# Patient Record
Sex: MALE | Race: WHITE | NOT HISPANIC OR LATINO | Employment: OTHER | URBAN - METROPOLITAN AREA
[De-identification: names, ages, dates, MRNs, and addresses within clinical notes are randomized per-mention and may not be internally consistent; named-entity substitution may affect disease eponyms.]

---

## 2023-07-18 ENCOUNTER — OFFICE VISIT (OUTPATIENT)
Dept: PRIMARY CARE | Facility: CLINIC | Age: 43
End: 2023-07-18
Payer: COMMERCIAL

## 2023-07-18 VITALS
WEIGHT: 315 LBS | DIASTOLIC BLOOD PRESSURE: 70 MMHG | HEIGHT: 67 IN | OXYGEN SATURATION: 96 % | RESPIRATION RATE: 20 BRPM | BODY MASS INDEX: 49.44 KG/M2 | HEART RATE: 109 BPM | SYSTOLIC BLOOD PRESSURE: 132 MMHG

## 2023-07-18 DIAGNOSIS — J45.30 MILD PERSISTENT ASTHMA WITHOUT COMPLICATION (HHS-HCC): Primary | ICD-10-CM

## 2023-07-18 DIAGNOSIS — E66.01 MORBID OBESITY (MULTI): ICD-10-CM

## 2023-07-18 DIAGNOSIS — I10 BENIGN ESSENTIAL HYPERTENSION: ICD-10-CM

## 2023-07-18 DIAGNOSIS — S81.802A OPEN WOUND OF LEFT LOWER EXTREMITY, INITIAL ENCOUNTER: ICD-10-CM

## 2023-07-18 DIAGNOSIS — E78.2 MIXED HYPERLIPIDEMIA: ICD-10-CM

## 2023-07-18 DIAGNOSIS — Z00.00 ANNUAL PHYSICAL EXAM: ICD-10-CM

## 2023-07-18 DIAGNOSIS — M1A.9XX0 CHRONIC GOUT WITHOUT TOPHUS, UNSPECIFIED CAUSE, UNSPECIFIED SITE: ICD-10-CM

## 2023-07-18 DIAGNOSIS — G47.33 OSA ON CPAP: ICD-10-CM

## 2023-07-18 PROBLEM — M79.89 LEG SWELLING: Status: ACTIVE | Noted: 2023-07-18

## 2023-07-18 PROBLEM — E66.9 OBESITY: Status: ACTIVE | Noted: 2023-07-18

## 2023-07-18 PROBLEM — J06.9 UPPER RESPIRATORY INFECTION WITH COUGH AND CONGESTION: Status: ACTIVE | Noted: 2023-07-18

## 2023-07-18 PROBLEM — R53.83 FATIGUE: Status: ACTIVE | Noted: 2023-07-18

## 2023-07-18 PROBLEM — M54.50 LOWER BACK PAIN: Status: ACTIVE | Noted: 2023-07-18

## 2023-07-18 PROBLEM — L85.3 DRY SKIN DERMATITIS: Status: ACTIVE | Noted: 2023-07-18

## 2023-07-18 PROBLEM — M67.40 GANGLION CYST: Status: ACTIVE | Noted: 2023-07-18

## 2023-07-18 PROBLEM — R19.5 DARK STOOLS: Status: ACTIVE | Noted: 2023-07-18

## 2023-07-18 PROBLEM — L72.3 SEBACEOUS CYST: Status: ACTIVE | Noted: 2023-07-18

## 2023-07-18 PROBLEM — M79.671 RIGHT FOOT PAIN: Status: ACTIVE | Noted: 2023-07-18

## 2023-07-18 PROBLEM — R74.8 ELEVATED LIVER ENZYMES: Status: ACTIVE | Noted: 2023-07-18

## 2023-07-18 PROBLEM — R60.0 EDEMA OF EXTREMITIES: Status: ACTIVE | Noted: 2023-07-18

## 2023-07-18 PROBLEM — E78.00 HYPERCHOLESTEROLEMIA: Status: ACTIVE | Noted: 2023-07-18

## 2023-07-18 PROBLEM — R10.84 ABDOMINAL DISCOMFORT, GENERALIZED: Status: ACTIVE | Noted: 2023-07-18

## 2023-07-18 PROBLEM — S29.019A ACUTE THORACIC MYOFASCIAL STRAIN: Status: ACTIVE | Noted: 2023-07-18

## 2023-07-18 PROBLEM — K64.8 INTERNAL HEMORRHOIDS: Status: ACTIVE | Noted: 2023-07-18

## 2023-07-18 PROBLEM — I87.309 STASIS EDEMA: Status: ACTIVE | Noted: 2023-07-18

## 2023-07-18 PROBLEM — M25.571 ANKLE PAIN, RIGHT: Status: ACTIVE | Noted: 2023-07-18

## 2023-07-18 PROBLEM — T14.8XXA MUSCLE STRAIN: Status: ACTIVE | Noted: 2023-07-18

## 2023-07-18 PROBLEM — M79.672 PAIN IN BOTH FEET: Status: ACTIVE | Noted: 2023-07-18

## 2023-07-18 PROBLEM — S76.219A GROIN STRAIN: Status: ACTIVE | Noted: 2023-07-18

## 2023-07-18 PROBLEM — M79.671 PAIN IN BOTH FEET: Status: ACTIVE | Noted: 2023-07-18

## 2023-07-18 PROBLEM — J45.909 ASTHMA (HHS-HCC): Status: ACTIVE | Noted: 2023-07-18

## 2023-07-18 PROBLEM — M10.9 GOUT: Status: ACTIVE | Noted: 2023-07-18

## 2023-07-18 PROBLEM — E78.5 HYPERLIPIDEMIA: Status: ACTIVE | Noted: 2023-07-18

## 2023-07-18 PROBLEM — R70.0 ELEVATED SED RATE: Status: ACTIVE | Noted: 2023-07-18

## 2023-07-18 PROBLEM — J11.1 INFLUENZA: Status: ACTIVE | Noted: 2023-07-18

## 2023-07-18 PROBLEM — R35.89 POLYURIA: Status: ACTIVE | Noted: 2023-07-18

## 2023-07-18 PROBLEM — M54.16 ACUTE LUMBAR RADICULOPATHY: Status: ACTIVE | Noted: 2023-07-18

## 2023-07-18 PROBLEM — R73.9 HYPERGLYCEMIA: Status: ACTIVE | Noted: 2023-07-18

## 2023-07-18 PROBLEM — B35.4 TINEA CORPORIS: Status: ACTIVE | Noted: 2023-07-18

## 2023-07-18 PROBLEM — R06.83 SNORING: Status: ACTIVE | Noted: 2023-07-18

## 2023-07-18 PROCEDURE — 1036F TOBACCO NON-USER: CPT | Performed by: INTERNAL MEDICINE

## 2023-07-18 PROCEDURE — 3075F SYST BP GE 130 - 139MM HG: CPT | Performed by: INTERNAL MEDICINE

## 2023-07-18 PROCEDURE — 3078F DIAST BP <80 MM HG: CPT | Performed by: INTERNAL MEDICINE

## 2023-07-18 PROCEDURE — 99204 OFFICE O/P NEW MOD 45 MIN: CPT | Performed by: INTERNAL MEDICINE

## 2023-07-18 RX ORDER — BUDESONIDE AND FORMOTEROL FUMARATE DIHYDRATE 160; 4.5 UG/1; UG/1
2 AEROSOL RESPIRATORY (INHALATION)
Qty: 1 EACH | Refills: 11 | Status: SHIPPED | OUTPATIENT
Start: 2023-07-18 | End: 2023-07-18 | Stop reason: SDUPTHER

## 2023-07-18 RX ORDER — DOXYCYCLINE 100 MG/1
100 CAPSULE ORAL 2 TIMES DAILY
Qty: 20 CAPSULE | Refills: 0 | Status: SHIPPED | OUTPATIENT
Start: 2023-07-18 | End: 2023-07-28

## 2023-07-18 RX ORDER — LISINOPRIL AND HYDROCHLOROTHIAZIDE 12.5; 2 MG/1; MG/1
TABLET ORAL
COMMUNITY
End: 2023-07-18 | Stop reason: SDUPTHER

## 2023-07-18 RX ORDER — ALBUTEROL SULFATE 90 UG/1
AEROSOL, METERED RESPIRATORY (INHALATION)
COMMUNITY
Start: 2019-06-13 | End: 2023-10-05 | Stop reason: HOSPADM

## 2023-07-18 RX ORDER — ATORVASTATIN CALCIUM 20 MG/1
1 TABLET, FILM COATED ORAL DAILY
COMMUNITY
Start: 2022-04-28 | End: 2023-07-18 | Stop reason: SINTOL

## 2023-07-18 RX ORDER — LISINOPRIL AND HYDROCHLOROTHIAZIDE 12.5; 2 MG/1; MG/1
TABLET ORAL
Qty: 60 TABLET | Refills: 11 | Status: SHIPPED | OUTPATIENT
Start: 2023-07-18 | End: 2023-07-18 | Stop reason: SDUPTHER

## 2023-07-18 RX ORDER — COLCHICINE 0.6 MG/1
TABLET ORAL
COMMUNITY
Start: 2023-02-27 | End: 2023-10-05 | Stop reason: HOSPADM

## 2023-07-18 RX ORDER — BUDESONIDE AND FORMOTEROL FUMARATE DIHYDRATE 160; 4.5 UG/1; UG/1
AEROSOL RESPIRATORY (INHALATION)
COMMUNITY
Start: 2020-12-09 | End: 2023-07-18 | Stop reason: SDUPTHER

## 2023-07-18 ASSESSMENT — PATIENT HEALTH QUESTIONNAIRE - PHQ9
SUM OF ALL RESPONSES TO PHQ9 QUESTIONS 1 & 2: 0
2. FEELING DOWN, DEPRESSED OR HOPELESS: NOT AT ALL
1. LITTLE INTEREST OR PLEASURE IN DOING THINGS: NOT AT ALL

## 2023-07-18 ASSESSMENT — PAIN SCALES - GENERAL: PAINLEVEL: 0-NO PAIN

## 2023-07-18 NOTE — PROGRESS NOTES
"Subjective   Chris Chance is a 42 y.o. male who presents for New Patient Visit.  Patient presents to [facility].  He was previously following with Sherry Angela.  Patient is morbidly obese.  He states that he lost 100 pounds in the past with a strict 500 -calorie/day diet, but has since put the weight back on.  He has recently resumed this diet and would like to wait 3 months before checking any blood work.  Patient has a history of hypertension.  Blood pressure in the office is 132/70.  Discussed goal blood pressure of less than 130/80.  Patient does not check his blood pressure at home.  Patient has a history of obstructive sleep apnea.  He is very compliant with his CPAP and states he cannot sleep without it.  Patient found to have a wound on his left lower extremity on exam.  He states that he hit his leg with a nozzle at work.  No fevers or chills.        Objective     /70 (BP Location: Right arm, Patient Position: Sitting, BP Cuff Size: Large adult)   Pulse 109   Resp 20   Ht 1.702 m (5' 7\")   Wt (!) 174 kg (383 lb)   SpO2 96%   BMI 59.99 kg/m²      Physical Exam  Constitutional:       Appearance: Normal appearance. He is obese.   HENT:      Head: Normocephalic and atraumatic.      Nose: Nose normal.      Mouth/Throat:      Mouth: Mucous membranes are moist.      Pharynx: Oropharynx is clear.   Eyes:      Extraocular Movements: Extraocular movements intact.      Pupils: Pupils are equal, round, and reactive to light.   Cardiovascular:      Rate and Rhythm: Normal rate and regular rhythm.   Pulmonary:      Effort: No respiratory distress.      Breath sounds: Normal breath sounds. No wheezing, rhonchi or rales.   Abdominal:      General: Bowel sounds are normal. There is no distension.      Palpations: Abdomen is soft.      Tenderness: There is no abdominal tenderness. There is no guarding.   Musculoskeletal:      Right lower leg: No edema.      Left lower leg: No edema.   Skin:     General: " Skin is warm and dry.      Findings: Lesion (large ulcerative wound on left anterior shin with surrounding erythema) present.   Neurological:      Mental Status: He is alert and oriented to person, place, and time. Mental status is at baseline.   Psychiatric:         Mood and Affect: Mood normal.         Behavior: Behavior normal.         Assessment/Plan   Problem List Items Addressed This Visit       Asthma - Primary    Relevant Medications    Symbicort 160-4.5 mcg/actuation inhaler    Benign essential hypertension    Relevant Medications    lisinopriL-hydrochlorothiazide 20-12.5 mg tablet    Other Relevant Orders    Comprehensive Metabolic Panel    Gout    Hyperlipidemia    Morbid obesity (CMS/HCC)    MICHAEL on CPAP    Open wound of left lower extremity    Relevant Medications    doxycycline (Vibramycin) 100 mg capsule     Other Visit Diagnoses       Annual physical exam        Relevant Orders    CBC    Comprehensive Metabolic Panel    Lipid Panel    Vitamin D, Total    Thyroid Stimulating Hormone    Hemoglobin A1C          Continue medications as previously prescribed, refills provided  Patient encouraged in his efforts for diet and weight loss  Start doxycycline 100mg BID x 10 days, keep area covered and protected, patient to call the office if wound does not heal, may need referral to wound clinic  Continue CPAP  Return in 3 months for a physical       Erik Avila DO

## 2023-07-25 RX ORDER — BUDESONIDE AND FORMOTEROL FUMARATE DIHYDRATE 160; 4.5 UG/1; UG/1
2 AEROSOL RESPIRATORY (INHALATION)
Qty: 1 EACH | Refills: 11 | Status: SHIPPED | OUTPATIENT
Start: 2023-07-25 | End: 2023-10-05 | Stop reason: HOSPADM

## 2023-07-25 RX ORDER — LISINOPRIL AND HYDROCHLOROTHIAZIDE 12.5; 2 MG/1; MG/1
TABLET ORAL
Qty: 60 TABLET | Refills: 11 | Status: SHIPPED | OUTPATIENT
Start: 2023-07-25 | End: 2023-10-05 | Stop reason: HOSPADM

## 2023-09-27 LAB
ALANINE AMINOTRANSFERASE (SGPT) (U/L) IN SER/PLAS: 15 U/L (ref 10–52)
ALBUMIN (G/DL) IN SER/PLAS: 2.7 G/DL (ref 3.4–5)
ALKALINE PHOSPHATASE (U/L) IN SER/PLAS: 126 U/L (ref 33–120)
ANION GAP IN SER/PLAS: 18 MMOL/L (ref 10–20)
ASPARTATE AMINOTRANSFERASE (SGOT) (U/L) IN SER/PLAS: 31 U/L (ref 9–39)
BASOPHILS (10*3/UL) IN BLOOD BY AUTOMATED COUNT: 0.07 X10E9/L (ref 0–0.1)
BASOPHILS/100 LEUKOCYTES IN BLOOD BY AUTOMATED COUNT: 0.6 % (ref 0–2)
BILIRUBIN TOTAL (MG/DL) IN SER/PLAS: 0.8 MG/DL (ref 0–1.2)
CALCIUM (MG/DL) IN SER/PLAS: 7.5 MG/DL (ref 8.6–10.3)
CARBON DIOXIDE, TOTAL (MMOL/L) IN SER/PLAS: 26 MMOL/L (ref 21–32)
CHLORIDE (MMOL/L) IN SER/PLAS: 92 MMOL/L (ref 98–107)
CREATININE (MG/DL) IN SER/PLAS: 1.14 MG/DL (ref 0.5–1.3)
EOSINOPHILS (10*3/UL) IN BLOOD BY AUTOMATED COUNT: 0.26 X10E9/L (ref 0–0.7)
EOSINOPHILS/100 LEUKOCYTES IN BLOOD BY AUTOMATED COUNT: 2.1 % (ref 0–6)
ERYTHROCYTE DISTRIBUTION WIDTH (RATIO) BY AUTOMATED COUNT: 13.7 % (ref 11.5–14.5)
ERYTHROCYTE MEAN CORPUSCULAR HEMOGLOBIN CONCENTRATION (G/DL) BY AUTOMATED: 32.2 G/DL (ref 32–36)
ERYTHROCYTE MEAN CORPUSCULAR VOLUME (FL) BY AUTOMATED COUNT: 106 FL (ref 80–100)
ERYTHROCYTES (10*6/UL) IN BLOOD BY AUTOMATED COUNT: 2.43 X10E12/L (ref 4.5–5.9)
GFR MALE: 82 ML/MIN/1.73M2
GLUCOSE (MG/DL) IN SER/PLAS: 91 MG/DL (ref 74–99)
HEMATOCRIT (%) IN BLOOD BY AUTOMATED COUNT: 25.8 % (ref 41–52)
HEMOGLOBIN (G/DL) IN BLOOD: 8.3 G/DL (ref 13.5–17.5)
IMMATURE GRANULOCYTES/100 LEUKOCYTES IN BLOOD BY AUTOMATED COUNT: 1.2 % (ref 0–0.9)
LACTATE (MMOL/L) IN SER/PLAS: 3.7 MMOL/L (ref 0.4–2)
LACTATE (MMOL/L) IN SER/PLAS: 4 MMOL/L (ref 0.4–2)
LACTATE (MMOL/L) IN SER/PLAS: 4 MMOL/L (ref 0.4–2)
LEUKOCYTES (10*3/UL) IN BLOOD BY AUTOMATED COUNT: 12.4 X10E9/L (ref 4.4–11.3)
LYMPHOCYTES (10*3/UL) IN BLOOD BY AUTOMATED COUNT: 1.21 X10E9/L (ref 1.2–4.8)
LYMPHOCYTES/100 LEUKOCYTES IN BLOOD BY AUTOMATED COUNT: 9.8 % (ref 13–44)
MAGNESIUM (MG/DL) IN SER/PLAS: 1.16 MG/DL (ref 1.6–2.4)
MONOCYTES (10*3/UL) IN BLOOD BY AUTOMATED COUNT: 0.91 X10E9/L (ref 0.1–1)
MONOCYTES/100 LEUKOCYTES IN BLOOD BY AUTOMATED COUNT: 7.4 % (ref 2–10)
NATRIURETIC PEPTIDE B (PG/ML) IN SER/PLAS: 7 PG/ML (ref 0–99)
NEUTROPHILS (10*3/UL) IN BLOOD BY AUTOMATED COUNT: 9.78 X10E9/L (ref 1.2–7.7)
NEUTROPHILS/100 LEUKOCYTES IN BLOOD BY AUTOMATED COUNT: 78.9 % (ref 40–80)
NRBC (PER 100 WBCS) BY AUTOMATED COUNT: 0 /100 WBC (ref 0–0)
PLATELETS (10*3/UL) IN BLOOD AUTOMATED COUNT: 373 X10E9/L (ref 150–450)
POTASSIUM (MMOL/L) IN SER/PLAS: 4.9 MMOL/L (ref 3.5–5.3)
PROTEIN TOTAL: 5.6 G/DL (ref 6.4–8.2)
SARS-COV-2 RESULT: NOT DETECTED
SODIUM (MMOL/L) IN SER/PLAS: 131 MMOL/L (ref 136–145)
TROPONIN I, HIGH SENSITIVITY: 4 NG/L (ref 0–20)
UREA NITROGEN (MG/DL) IN SER/PLAS: 14 MG/DL (ref 6–23)

## 2023-09-27 PROCEDURE — 71045 X-RAY EXAM CHEST 1 VIEW: CPT

## 2023-09-27 PROCEDURE — 96375 TX/PRO/DX INJ NEW DRUG ADDON: CPT

## 2023-09-27 PROCEDURE — 83605 ASSAY OF LACTIC ACID: CPT | Mod: 91

## 2023-09-27 PROCEDURE — 96366 THER/PROPH/DIAG IV INF ADDON: CPT

## 2023-09-27 PROCEDURE — 80053 COMPREHEN METABOLIC PANEL: CPT

## 2023-09-27 PROCEDURE — 83880 ASSAY OF NATRIURETIC PEPTIDE: CPT

## 2023-09-27 PROCEDURE — 85025 COMPLETE CBC W/AUTO DIFF WBC: CPT

## 2023-09-27 PROCEDURE — 94761 N-INVAS EAR/PLS OXIMETRY MLT: CPT

## 2023-09-27 PROCEDURE — 87635 SARS-COV-2 COVID-19 AMP PRB: CPT

## 2023-09-27 PROCEDURE — 87040 BLOOD CULTURE FOR BACTERIA: CPT

## 2023-09-27 PROCEDURE — 83735 ASSAY OF MAGNESIUM: CPT

## 2023-09-27 PROCEDURE — 96367 TX/PROPH/DG ADDL SEQ IV INF: CPT

## 2023-09-27 PROCEDURE — 96365 THER/PROPH/DIAG IV INF INIT: CPT

## 2023-09-27 PROCEDURE — 84484 ASSAY OF TROPONIN QUANT: CPT

## 2023-09-27 PROCEDURE — 36415 COLL VENOUS BLD VENIPUNCTURE: CPT

## 2023-09-27 PROCEDURE — 93970 EXTREMITY STUDY: CPT

## 2023-09-27 PROCEDURE — 99291 CRITICAL CARE FIRST HOUR: CPT

## 2023-09-27 PROCEDURE — 9990 CHARGE CONVERSION: Mod: 91

## 2023-09-28 ENCOUNTER — HOSPITAL ENCOUNTER (INPATIENT)
Dept: DATA CONVERSION | Facility: HOSPITAL | Age: 43
LOS: 7 days | Discharge: NURSING, REHAB OR LONG-TERM CARE | DRG: 871 | End: 2023-10-05
Attending: INTERNAL MEDICINE | Admitting: INTERNAL MEDICINE
Payer: COMMERCIAL

## 2023-09-28 DIAGNOSIS — E66.9 OBESITY, UNSPECIFIED CLASSIFICATION, UNSPECIFIED OBESITY TYPE, UNSPECIFIED WHETHER SERIOUS COMORBIDITY PRESENT: ICD-10-CM

## 2023-09-28 DIAGNOSIS — A41.9 SEPSIS, UNSPECIFIED ORGANISM (MULTI): ICD-10-CM

## 2023-09-28 DIAGNOSIS — R00.0 TACHYCARDIA: Primary | ICD-10-CM

## 2023-09-28 DIAGNOSIS — E66.09 OBESITY DUE TO EXCESS CALORIES WITH SERIOUS COMORBIDITY, UNSPECIFIED CLASSIFICATION: ICD-10-CM

## 2023-09-28 DIAGNOSIS — J06.9 UPPER RESPIRATORY INFECTION WITH COUGH AND CONGESTION: ICD-10-CM

## 2023-09-28 DIAGNOSIS — S81.802S OPEN WOUND OF LEFT LOWER EXTREMITY, SEQUELA: ICD-10-CM

## 2023-09-28 DIAGNOSIS — L03.90 CELLULITIS, UNSPECIFIED: ICD-10-CM

## 2023-09-28 LAB
ALANINE AMINOTRANSFERASE (SGPT) (U/L) IN SER/PLAS: 13 U/L (ref 10–52)
ALBUMIN (G/DL) IN SER/PLAS: 2.5 G/DL (ref 3.4–5)
ALKALINE PHOSPHATASE (U/L) IN SER/PLAS: 119 U/L (ref 33–120)
AMPHETAMINE (PRESENCE) IN URINE BY SCREEN METHOD: ABNORMAL
ANION GAP IN SER/PLAS: 16 MMOL/L (ref 10–20)
APPEARANCE, URINE: ABNORMAL
APPEARANCE, URINE: NORMAL
APPEARANCE, URINE: NORMAL
ASCORBIC ACID: NORMAL MG/DL
ASCORBIC ACID: NORMAL MG/DL
ASPARTATE AMINOTRANSFERASE (SGOT) (U/L) IN SER/PLAS: 26 U/L (ref 9–39)
BACTERIA, URINE: ABNORMAL /HPF
BARBITURATES PRESENCE IN URINE BY SCREEN METHOD: ABNORMAL
BENZODIAZEPINE (PRESENCE) IN URINE BY SCREEN METHOD: ABNORMAL
BILIRUBIN TOTAL (MG/DL) IN SER/PLAS: 1.1 MG/DL (ref 0–1.2)
BILIRUBIN, URINE: NEGATIVE
BILIRUBIN, URINE: NORMAL
BILIRUBIN, URINE: NORMAL
BLOOD, URINE: NEGATIVE
BLOOD, URINE: NORMAL
BLOOD, URINE: NORMAL
C REACTIVE PROTEIN (MG/L) IN SER/PLAS: 9.71 MG/DL
CALCIUM (MG/DL) IN SER/PLAS: 8.1 MG/DL (ref 8.6–10.3)
CANNABINOIDS IN URINE BY SCREEN METHOD: ABNORMAL
CARBON DIOXIDE, TOTAL (MMOL/L) IN SER/PLAS: 25 MMOL/L (ref 21–32)
CHLORIDE (MMOL/L) IN SER/PLAS: 94 MMOL/L (ref 98–107)
COCAINE (PRESENCE) IN URINE BY SCREEN METHOD: ABNORMAL
COLOR, URINE: NORMAL
COLOR, URINE: NORMAL
COLOR, URINE: YELLOW
CREATININE (MG/DL) IN SER/PLAS: 1.63 MG/DL (ref 0.5–1.3)
DRUG SCREEN COMMENT URINE: ABNORMAL
ERYTHROCYTE DISTRIBUTION WIDTH (RATIO) BY AUTOMATED COUNT: 13.8 % (ref 11.5–14.5)
ERYTHROCYTE MEAN CORPUSCULAR HEMOGLOBIN CONCENTRATION (G/DL) BY AUTOMATED: 31.7 G/DL (ref 32–36)
ERYTHROCYTE MEAN CORPUSCULAR VOLUME (FL) BY AUTOMATED COUNT: 108 FL (ref 80–100)
ERYTHROCYTES (10*6/UL) IN BLOOD BY AUTOMATED COUNT: 2.11 X10E12/L (ref 4.5–5.9)
FENTANYL URINE: ABNORMAL
FERRITIN (UG/LL) IN SER/PLAS: 954 UG/L (ref 20–300)
FOLATE (NG/ML) IN SER/PLAS: 4.9 NG/ML
GFR MALE: 53 ML/MIN/1.73M2
GLUCOSE (MG/DL) IN SER/PLAS: 96 MG/DL (ref 74–99)
GLUCOSE, URINE: NEGATIVE MG/DL
GLUCOSE, URINE: NORMAL
GLUCOSE, URINE: NORMAL
HEMATOCRIT (%) IN BLOOD BY AUTOMATED COUNT: 22.7 % (ref 41–52)
HEMOGLOBIN (G/DL) IN BLOOD: 7.2 G/DL (ref 13.5–17.5)
IRON (UG/DL) IN SER/PLAS: 75 UG/DL (ref 35–150)
IRON BINDING CAPACITY (UG/DL) IN SER/PLAS: 135 UG/DL (ref 240–445)
IRON SATURATION (%) IN SER/PLAS: 56 % (ref 25–45)
KETONES, URINE: NEGATIVE MG/DL
KETONES, URINE: NORMAL
KETONES, URINE: NORMAL
LACTATE (MMOL/L) IN SER/PLAS: 2.5 MMOL/L (ref 0.4–2)
LACTATE (MMOL/L) IN SER/PLAS: 3.7 MMOL/L (ref 0.4–2)
LEUKOCYTE ESTERASE, URINE: ABNORMAL
LEUKOCYTE ESTERASE, URINE: NORMAL
LEUKOCYTE ESTERASE, URINE: NORMAL
LEUKOCYTES (10*3/UL) IN BLOOD BY AUTOMATED COUNT: 7.5 X10E9/L (ref 4.4–11.3)
MAGNESIUM (MG/DL) IN SER/PLAS: 1.63 MG/DL (ref 1.6–2.4)
NITRITE, URINE: NEGATIVE
NITRITE, URINE: NORMAL
NITRITE, URINE: NORMAL
NRBC (PER 100 WBCS) BY AUTOMATED COUNT: 0 /100 WBC (ref 0–0)
OPIATES (PRESENCE) IN URINE BY SCREEN METHOD: ABNORMAL
OXYCODONE (PRESENCE) IN URINE BY SCREEN METHOD: ABNORMAL
PH, URINE: 5 (ref 5–8)
PH, URINE: NORMAL
PH, URINE: NORMAL
PHENCYCLIDINE (PRESENCE) IN URINE BY SCREEN METHOD: ABNORMAL
PLATELETS (10*3/UL) IN BLOOD AUTOMATED COUNT: 306 X10E9/L (ref 150–450)
POTASSIUM (MMOL/L) IN SER/PLAS: 4.8 MMOL/L (ref 3.5–5.3)
PROTEIN TOTAL: 5.1 G/DL (ref 6.4–8.2)
PROTEIN, URINE: NEGATIVE MG/DL
PROTEIN, URINE: NORMAL
PROTEIN, URINE: NORMAL
RBC, URINE: <1 /HPF (ref 0–5)
SEDIMENTATION RATE, ERYTHROCYTE: >130 MM/H (ref 0–15)
SODIUM (MMOL/L) IN SER/PLAS: 130 MMOL/L (ref 136–145)
SPECIFIC GRAVITY, URINE: 1.02 (ref 1–1.03)
SPECIFIC GRAVITY, URINE: NORMAL
SPECIFIC GRAVITY, URINE: NORMAL
SQUAMOUS EPITHELIAL CELLS, URINE: <1 /HPF
UREA NITROGEN (MG/DL) IN SER/PLAS: 20 MG/DL (ref 6–23)
UROBILINOGEN, URINE: <2 MG/DL (ref 0–1.9)
UROBILINOGEN, URINE: NORMAL
UROBILINOGEN, URINE: NORMAL
VANCOMYCIN (UG/ML) IN SER/PLAS: 15.1 UG/ML
WBC, URINE: 4 /HPF (ref 0–5)

## 2023-09-28 PROCEDURE — 83540 ASSAY OF IRON: CPT

## 2023-09-28 PROCEDURE — 96367 TX/PROPH/DG ADDL SEQ IV INF: CPT

## 2023-09-28 PROCEDURE — 83605 ASSAY OF LACTIC ACID: CPT

## 2023-09-28 PROCEDURE — 83735 ASSAY OF MAGNESIUM: CPT

## 2023-09-28 PROCEDURE — 85027 COMPLETE CBC AUTOMATED: CPT

## 2023-09-28 PROCEDURE — 96366 THER/PROPH/DIAG IV INF ADDON: CPT

## 2023-09-28 PROCEDURE — 82746 ASSAY OF FOLIC ACID SERUM: CPT

## 2023-09-28 PROCEDURE — 96365 THER/PROPH/DIAG IV INF INIT: CPT

## 2023-09-28 PROCEDURE — 80307 DRUG TEST PRSMV CHEM ANLYZR: CPT

## 2023-09-28 PROCEDURE — 9990 CHARGE CONVERSION: Mod: 91

## 2023-09-28 PROCEDURE — 86140 C-REACTIVE PROTEIN: CPT

## 2023-09-28 PROCEDURE — 36415 COLL VENOUS BLD VENIPUNCTURE: CPT

## 2023-09-28 PROCEDURE — 85652 RBC SED RATE AUTOMATED: CPT

## 2023-09-28 PROCEDURE — 81001 URINALYSIS AUTO W/SCOPE: CPT | Mod: 91

## 2023-09-28 PROCEDURE — 94761 N-INVAS EAR/PLS OXIMETRY MLT: CPT

## 2023-09-28 PROCEDURE — 93922 UPR/L XTREMITY ART 2 LEVELS: CPT

## 2023-09-28 PROCEDURE — 99291 CRITICAL CARE FIRST HOUR: CPT

## 2023-09-28 PROCEDURE — 82728 ASSAY OF FERRITIN: CPT

## 2023-09-28 PROCEDURE — 80202 ASSAY OF VANCOMYCIN: CPT

## 2023-09-28 PROCEDURE — 83550 IRON BINDING TEST: CPT

## 2023-09-28 PROCEDURE — 80053 COMPREHEN METABOLIC PANEL: CPT

## 2023-09-28 PROCEDURE — 96375 TX/PRO/DX INJ NEW DRUG ADDON: CPT

## 2023-09-29 LAB
ABO GROUP (TYPE) IN BLOOD: NORMAL
ABO GROUP (TYPE) IN BLOOD: NORMAL
ALANINE AMINOTRANSFERASE (SGPT) (U/L) IN SER/PLAS: 12 U/L (ref 10–52)
ALBUMIN (G/DL) IN SER/PLAS: 2.5 G/DL (ref 3.4–5)
ALKALINE PHOSPHATASE (U/L) IN SER/PLAS: 104 U/L (ref 33–120)
ANION GAP IN SER/PLAS: 15 MMOL/L (ref 10–20)
ANTIBODY SCREEN: NORMAL
ASPARTATE AMINOTRANSFERASE (SGOT) (U/L) IN SER/PLAS: 26 U/L (ref 9–39)
BILIRUBIN TOTAL (MG/DL) IN SER/PLAS: 0.7 MG/DL (ref 0–1.2)
CALCIUM (MG/DL) IN SER/PLAS: 7.9 MG/DL (ref 8.6–10.3)
CARBON DIOXIDE, TOTAL (MMOL/L) IN SER/PLAS: 26 MMOL/L (ref 21–32)
CHLORIDE (MMOL/L) IN SER/PLAS: 96 MMOL/L (ref 98–107)
CREATININE (MG/DL) IN SER/PLAS: 1.16 MG/DL (ref 0.5–1.3)
ERYTHROCYTE DISTRIBUTION WIDTH (RATIO) BY AUTOMATED COUNT: 13.7 % (ref 11.5–14.5)
ERYTHROCYTE MEAN CORPUSCULAR HEMOGLOBIN CONCENTRATION (G/DL) BY AUTOMATED: 30.7 G/DL (ref 32–36)
ERYTHROCYTE MEAN CORPUSCULAR VOLUME (FL) BY AUTOMATED COUNT: 109 FL (ref 80–100)
ERYTHROCYTES (10*6/UL) IN BLOOD BY AUTOMATED COUNT: 2.07 X10E12/L (ref 4.5–5.9)
GFR MALE: 80 ML/MIN/1.73M2
GLUCOSE (MG/DL) IN SER/PLAS: 91 MG/DL (ref 74–99)
HEMATOCRIT (%) IN BLOOD BY AUTOMATED COUNT: 21.3 % (ref 41–52)
HEMATOCRIT (%) IN BLOOD BY AUTOMATED COUNT: 22.5 % (ref 41–52)
HEMOGLOBIN (G/DL) IN BLOOD: 6.7 G/DL (ref 13.5–17.5)
HEMOGLOBIN (G/DL) IN BLOOD: 6.9 G/DL (ref 13.5–17.5)
LACTATE (MMOL/L) IN SER/PLAS: 1.5 MMOL/L (ref 0.4–2)
LEUKOCYTES (10*3/UL) IN BLOOD BY AUTOMATED COUNT: 6.2 X10E9/L (ref 4.4–11.3)
NRBC (PER 100 WBCS) BY AUTOMATED COUNT: 0 /100 WBC (ref 0–0)
PLATELETS (10*3/UL) IN BLOOD AUTOMATED COUNT: 274 X10E9/L (ref 150–450)
POTASSIUM (MMOL/L) IN SER/PLAS: 4.6 MMOL/L (ref 3.5–5.3)
PROTEIN TOTAL: 5.2 G/DL (ref 6.4–8.2)
RH FACTOR: NORMAL
RH FACTOR: NORMAL
SODIUM (MMOL/L) IN SER/PLAS: 132 MMOL/L (ref 136–145)
UREA NITROGEN (MG/DL) IN SER/PLAS: 17 MG/DL (ref 6–23)
VANCOMYCIN (UG/ML) IN SER/PLAS - TROUGH: NORMAL

## 2023-09-29 PROCEDURE — 83550 IRON BINDING TEST: CPT

## 2023-09-29 PROCEDURE — 87186 SC STD MICRODIL/AGAR DIL: CPT

## 2023-09-29 PROCEDURE — 87077 CULTURE AEROBIC IDENTIFY: CPT

## 2023-09-29 PROCEDURE — 80053 COMPREHEN METABOLIC PANEL: CPT

## 2023-09-29 PROCEDURE — 85027 COMPLETE CBC AUTOMATED: CPT

## 2023-09-29 PROCEDURE — 88305 TISSUE EXAM BY PATHOLOGIST: CPT

## 2023-09-29 PROCEDURE — 36415 COLL VENOUS BLD VENIPUNCTURE: CPT

## 2023-09-29 PROCEDURE — 87070 CULTURE OTHR SPECIMN AEROBIC: CPT

## 2023-09-29 PROCEDURE — 81001 URINALYSIS AUTO W/SCOPE: CPT | Mod: 91

## 2023-09-29 PROCEDURE — 0HDLXZZ EXTRACTION OF LEFT LOWER LEG SKIN, EXTERNAL APPROACH: ICD-10-PCS | Performed by: PODIATRIST

## 2023-09-29 PROCEDURE — 0753T DGTZ GLS MCRSCP SLD LEVEL IV: CPT

## 2023-09-29 PROCEDURE — 86920 COMPATIBILITY TEST SPIN: CPT

## 2023-09-29 PROCEDURE — 86140 C-REACTIVE PROTEIN: CPT

## 2023-09-29 PROCEDURE — 83605 ASSAY OF LACTIC ACID: CPT

## 2023-09-29 PROCEDURE — 87205 SMEAR GRAM STAIN: CPT

## 2023-09-29 PROCEDURE — 93922 UPR/L XTREMITY ART 2 LEVELS: CPT

## 2023-09-29 PROCEDURE — 80202 ASSAY OF VANCOMYCIN: CPT

## 2023-09-29 PROCEDURE — 87075 CULTR BACTERIA EXCEPT BLOOD: CPT

## 2023-09-29 PROCEDURE — 94640 AIRWAY INHALATION TREATMENT: CPT

## 2023-09-29 PROCEDURE — 0HBLXZX EXCISION OF LEFT LOWER LEG SKIN, EXTERNAL APPROACH, DIAGNOSTIC: ICD-10-PCS | Performed by: PODIATRIST

## 2023-09-29 PROCEDURE — 82728 ASSAY OF FERRITIN: CPT

## 2023-09-29 PROCEDURE — 83540 ASSAY OF IRON: CPT

## 2023-09-29 PROCEDURE — P9016 RBC LEUKOCYTES REDUCED: HCPCS

## 2023-09-29 PROCEDURE — 83735 ASSAY OF MAGNESIUM: CPT

## 2023-09-29 PROCEDURE — 80307 DRUG TEST PRSMV CHEM ANLYZR: CPT

## 2023-09-29 PROCEDURE — 86850 RBC ANTIBODY SCREEN: CPT

## 2023-09-29 PROCEDURE — 0HDKXZZ EXTRACTION OF RIGHT LOWER LEG SKIN, EXTERNAL APPROACH: ICD-10-PCS | Performed by: PODIATRIST

## 2023-09-29 PROCEDURE — 85018 HEMOGLOBIN: CPT | Mod: 91

## 2023-09-29 PROCEDURE — 86900 BLOOD TYPING SEROLOGIC ABO: CPT

## 2023-09-29 PROCEDURE — 85014 HEMATOCRIT: CPT | Mod: 91

## 2023-09-29 PROCEDURE — 86901 BLOOD TYPING SEROLOGIC RH(D): CPT

## 2023-09-29 PROCEDURE — 85652 RBC SED RATE AUTOMATED: CPT

## 2023-09-29 PROCEDURE — 9990 CHARGE CONVERSION: Mod: 91

## 2023-09-29 PROCEDURE — 82746 ASSAY OF FOLIC ACID SERUM: CPT

## 2023-09-29 PROCEDURE — 83036 HEMOGLOBIN GLYCOSYLATED A1C: CPT

## 2023-09-29 RX ORDER — ACETAMINOPHEN 325 MG/1
650 TABLET ORAL EVERY 4 HOURS PRN
Status: DISCONTINUED | OUTPATIENT
Start: 2023-09-30 | End: 2023-10-05 | Stop reason: HOSPADM

## 2023-09-29 RX ORDER — VANCOMYCIN HYDROCHLORIDE 1 G/200ML
1000 INJECTION, SOLUTION INTRAVENOUS EVERY 12 HOURS
Status: DISCONTINUED | OUTPATIENT
Start: 2023-09-30 | End: 2023-09-30

## 2023-09-29 RX ORDER — HEPARIN SODIUM 5000 [USP'U]/ML
7500 INJECTION, SOLUTION INTRAVENOUS; SUBCUTANEOUS EVERY 8 HOURS
Status: DISCONTINUED | OUTPATIENT
Start: 2023-09-30 | End: 2023-10-05 | Stop reason: HOSPADM

## 2023-09-29 RX ORDER — FLUTICASONE FUROATE AND VILANTEROL 200; 25 UG/1; UG/1
1 POWDER RESPIRATORY (INHALATION) DAILY
Status: DISCONTINUED | OUTPATIENT
Start: 2023-09-30 | End: 2023-10-01

## 2023-09-29 RX ORDER — SODIUM CHLORIDE 9 MG/ML
100 INJECTION, SOLUTION INTRAVENOUS CONTINUOUS
Status: DISCONTINUED | OUTPATIENT
Start: 2023-09-30 | End: 2023-10-05 | Stop reason: HOSPADM

## 2023-09-29 RX ORDER — MORPHINE SULFATE 4 MG/ML
4 INJECTION, SOLUTION INTRAMUSCULAR; INTRAVENOUS EVERY 4 HOURS PRN
Status: DISCONTINUED | OUTPATIENT
Start: 2023-09-30 | End: 2023-10-02

## 2023-09-30 ENCOUNTER — APPOINTMENT (OUTPATIENT)
Dept: CARDIOLOGY | Facility: HOSPITAL | Age: 43
DRG: 871 | End: 2023-09-30
Payer: COMMERCIAL

## 2023-09-30 PROBLEM — R00.0 TACHYCARDIA: Status: ACTIVE | Noted: 2023-09-30

## 2023-09-30 LAB
ALBUMIN SERPL BCP-MCNC: 2.6 G/DL (ref 3.4–5)
ALP SERPL-CCNC: 106 U/L (ref 33–120)
ALT SERPL W P-5'-P-CCNC: 13 U/L (ref 10–52)
ANION GAP SERPL CALC-SCNC: 12 MMOL/L (ref 10–20)
AST SERPL W P-5'-P-CCNC: 33 U/L (ref 9–39)
BILIRUB SERPL-MCNC: 0.7 MG/DL (ref 0–1.2)
BUN SERPL-MCNC: 12 MG/DL (ref 6–23)
CALCIUM SERPL-MCNC: 8.1 MG/DL (ref 8.6–10.3)
CHLORIDE SERPL-SCNC: 98 MMOL/L (ref 98–107)
CO2 SERPL-SCNC: 27 MMOL/L (ref 21–32)
CREAT SERPL-MCNC: 0.98 MG/DL (ref 0.5–1.3)
EJECTION FRACTION APICAL 4 CHAMBER: 69.3
ERYTHROCYTE [DISTWIDTH] IN BLOOD BY AUTOMATED COUNT: 15.2 % (ref 11.5–14.5)
GFR SERPL CREATININE-BSD FRML MDRD: >90 ML/MIN/1.73M*2
GLUCOSE SERPL-MCNC: 94 MG/DL (ref 74–99)
HCT VFR BLD AUTO: 24.6 % (ref 41–52)
HGB BLD-MCNC: 7.6 G/DL (ref 13.5–17.5)
MCH RBC QN AUTO: 32.9 PG (ref 26–34)
MCHC RBC AUTO-ENTMCNC: 30.9 G/DL (ref 32–36)
MCV RBC AUTO: 107 FL (ref 80–100)
NRBC BLD-RTO: 0 /100 WBCS (ref 0–0)
PLATELET # BLD AUTO: 250 X10*3/UL (ref 150–450)
PMV BLD AUTO: 7.9 FL (ref 7.5–11.5)
POTASSIUM SERPL-SCNC: 4.4 MMOL/L (ref 3.5–5.3)
PROT SERPL-MCNC: 5.2 G/DL (ref 6.4–8.2)
RBC # BLD AUTO: 2.31 X10*6/UL (ref 4.5–5.9)
SODIUM SERPL-SCNC: 133 MMOL/L (ref 136–145)
VANCOMYCIN TROUGH SERPL-MCNC: 16.1 UG/ML (ref 5–20)
WBC # BLD AUTO: 8.1 X10*3/UL (ref 4.4–11.3)

## 2023-09-30 PROCEDURE — 85025 COMPLETE CBC W/AUTO DIFF WBC: CPT

## 2023-09-30 PROCEDURE — 86901 BLOOD TYPING SEROLOGIC RH(D): CPT

## 2023-09-30 PROCEDURE — 2500000002 HC RX 250 W HCPCS SELF ADMINISTERED DRUGS (ALT 637 FOR MEDICARE OP, ALT 636 FOR OP/ED): Performed by: INTERNAL MEDICINE

## 2023-09-30 PROCEDURE — 80053 COMPREHEN METABOLIC PANEL: CPT | Performed by: INTERNAL MEDICINE

## 2023-09-30 PROCEDURE — 87635 SARS-COV-2 COVID-19 AMP PRB: CPT

## 2023-09-30 PROCEDURE — 87040 BLOOD CULTURE FOR BACTERIA: CPT | Mod: 59

## 2023-09-30 PROCEDURE — 1200000002 HC GENERAL ROOM WITH TELEMETRY DAILY

## 2023-09-30 PROCEDURE — 83036 HEMOGLOBIN GLYCOSYLATED A1C: CPT

## 2023-09-30 PROCEDURE — 94640 AIRWAY INHALATION TREATMENT: CPT

## 2023-09-30 PROCEDURE — 85018 HEMOGLOBIN: CPT | Mod: 91

## 2023-09-30 PROCEDURE — 86920 COMPATIBILITY TEST SPIN: CPT

## 2023-09-30 PROCEDURE — 84484 ASSAY OF TROPONIN QUANT: CPT

## 2023-09-30 PROCEDURE — 80053 COMPREHEN METABOLIC PANEL: CPT

## 2023-09-30 PROCEDURE — 85027 COMPLETE CBC AUTOMATED: CPT | Performed by: INTERNAL MEDICINE

## 2023-09-30 PROCEDURE — 93306 TTE W/DOPPLER COMPLETE: CPT

## 2023-09-30 PROCEDURE — 36415 COLL VENOUS BLD VENIPUNCTURE: CPT | Performed by: INTERNAL MEDICINE

## 2023-09-30 PROCEDURE — 93306 TTE W/DOPPLER COMPLETE: CPT | Performed by: INTERNAL MEDICINE

## 2023-09-30 PROCEDURE — 83735 ASSAY OF MAGNESIUM: CPT

## 2023-09-30 PROCEDURE — 9990 CHARGE CONVERSION: Mod: 91

## 2023-09-30 PROCEDURE — 85014 HEMATOCRIT: CPT | Mod: 91

## 2023-09-30 PROCEDURE — 86900 BLOOD TYPING SEROLOGIC ABO: CPT

## 2023-09-30 PROCEDURE — 85027 COMPLETE CBC AUTOMATED: CPT

## 2023-09-30 PROCEDURE — 83880 ASSAY OF NATRIURETIC PEPTIDE: CPT

## 2023-09-30 PROCEDURE — 2500000001 HC RX 250 WO HCPCS SELF ADMINISTERED DRUGS (ALT 637 FOR MEDICARE OP): Performed by: NURSE PRACTITIONER

## 2023-09-30 PROCEDURE — 83605 ASSAY OF LACTIC ACID: CPT | Mod: 91

## 2023-09-30 PROCEDURE — 99232 SBSQ HOSP IP/OBS MODERATE 35: CPT | Performed by: INTERNAL MEDICINE

## 2023-09-30 PROCEDURE — P9016 RBC LEUKOCYTES REDUCED: HCPCS

## 2023-09-30 PROCEDURE — 86850 RBC ANTIBODY SCREEN: CPT

## 2023-09-30 PROCEDURE — 80202 ASSAY OF VANCOMYCIN: CPT | Performed by: INTERNAL MEDICINE

## 2023-09-30 PROCEDURE — 36415 COLL VENOUS BLD VENIPUNCTURE: CPT | Mod: 91

## 2023-09-30 PROCEDURE — 2500000004 HC RX 250 GENERAL PHARMACY W/ HCPCS (ALT 636 FOR OP/ED): Performed by: INTERNAL MEDICINE

## 2023-09-30 PROCEDURE — 99255 IP/OBS CONSLTJ NEW/EST HI 80: CPT | Performed by: INTERNAL MEDICINE

## 2023-09-30 RX ORDER — ALBUTEROL SULFATE 90 UG/1
2 AEROSOL, METERED RESPIRATORY (INHALATION) EVERY 6 HOURS PRN
Status: DISCONTINUED | OUTPATIENT
Start: 2023-09-30 | End: 2023-10-03

## 2023-09-30 RX ORDER — FLUTICASONE FUROATE AND VILANTEROL 100; 25 UG/1; UG/1
1 POWDER RESPIRATORY (INHALATION)
Status: DISCONTINUED | OUTPATIENT
Start: 2023-09-30 | End: 2023-09-30

## 2023-09-30 RX ORDER — MORPHINE SULFATE 4 MG/ML
INJECTION, SOLUTION INTRAMUSCULAR; INTRAVENOUS
Status: DISPENSED
Start: 2023-09-30 | End: 2023-09-30

## 2023-09-30 RX ORDER — METOPROLOL SUCCINATE 25 MG/1
25 TABLET, EXTENDED RELEASE ORAL DAILY
Status: DISCONTINUED | OUTPATIENT
Start: 2023-09-30 | End: 2023-10-03

## 2023-09-30 RX ADMIN — MORPHINE SULFATE 4 MG: 4 INJECTION, SOLUTION INTRAMUSCULAR; INTRAVENOUS at 10:55

## 2023-09-30 RX ADMIN — PIPERACILLIN SODIUM AND TAZOBACTAM SODIUM 4.5 G: 4; .5 INJECTION, SOLUTION INTRAVENOUS at 17:00

## 2023-09-30 RX ADMIN — VANCOMYCIN HYDROCHLORIDE 1250 MG: 1.25 INJECTION, POWDER, LYOPHILIZED, FOR SOLUTION INTRAVENOUS at 15:35

## 2023-09-30 RX ADMIN — HEPARIN SODIUM 7500 UNITS: 5000 INJECTION INTRAVENOUS; SUBCUTANEOUS at 13:00

## 2023-09-30 RX ADMIN — FLUTICASONE FUROATE AND VILANTEROL TRIFENATATE 1 PUFF: 200; 25 POWDER RESPIRATORY (INHALATION) at 07:10

## 2023-09-30 RX ADMIN — PIPERACILLIN SODIUM AND TAZOBACTAM SODIUM 4.5 G: 4; .5 INJECTION, SOLUTION INTRAVENOUS at 06:10

## 2023-09-30 RX ADMIN — MORPHINE SULFATE 4 MG: 4 INJECTION, SOLUTION INTRAMUSCULAR; INTRAVENOUS at 20:10

## 2023-09-30 RX ADMIN — METOPROLOL SUCCINATE 25 MG: 25 TABLET, EXTENDED RELEASE ORAL at 14:37

## 2023-09-30 RX ADMIN — VANCOMYCIN HYDROCHLORIDE 1000 MG: 1 INJECTION, SOLUTION INTRAVENOUS at 01:55

## 2023-09-30 RX ADMIN — PIPERACILLIN SODIUM AND TAZOBACTAM SODIUM 4.5 G: 4; .5 INJECTION, SOLUTION INTRAVENOUS at 12:09

## 2023-09-30 RX ADMIN — HEPARIN SODIUM 7500 UNITS: 5000 INJECTION INTRAVENOUS; SUBCUTANEOUS at 20:10

## 2023-09-30 RX ADMIN — MORPHINE SULFATE 4 MG: 4 INJECTION, SOLUTION INTRAMUSCULAR; INTRAVENOUS at 15:36

## 2023-09-30 ASSESSMENT — PAIN SCALES - GENERAL
PAINLEVEL_OUTOF10: 8
PAINLEVEL_OUTOF10: 8
PAINLEVEL_OUTOF10: 0 - NO PAIN

## 2023-09-30 ASSESSMENT — PAIN - FUNCTIONAL ASSESSMENT
PAIN_FUNCTIONAL_ASSESSMENT: 0-10
PAIN_FUNCTIONAL_ASSESSMENT: 0-10

## 2023-09-30 ASSESSMENT — PAIN SCALES - WONG BAKER
WONGBAKER_NUMERICALRESPONSE: HURTS WHOLE LOT
WONGBAKER_NUMERICALRESPONSE: HURTS WHOLE LOT

## 2023-09-30 NOTE — CONSULTS
Inpatient consult to Cardiology  Consult performed by: Rudolph Persaud DO  Consult ordered by: May Zarate, APRN-CNP      History Of Present Illness:    Chris Chance is a 42 y.o. male with a history of obesity, hypertension, obstructive sleep apnea but no other cardiovascular disease presenting with lower extremity edema and weeping and pain.  Denies associated chest pain or shortness of breath or palpitations.  This is a longstanding problem which has gotten progressively worse.  Cardiology was consulted because of persistent tachycardia.     Last Recorded Vitals:  Vitals:    09/30/23 0001 09/30/23 0532 09/30/23 0710 09/30/23 1000   BP: 138/66 108/55  116/58   BP Location:    Left arm   Patient Position:    Lying   Pulse: 110 (!) 119  (!) 119   Resp:    18   Temp: 36 °C (96.8 °F) 36.6 °C (97.9 °F)  36.8 °C (98.2 °F)   TempSrc:    Temporal   SpO2: 95% 93% 95% 94%   Weight:       Height:           Last Labs:  CBC - 9/30/2023:  8:13 AM  8.1 7.6 250    24.6      CMP - 9/30/2023:  8:13 AM  8.1 5.2 33 --- 0.7   _ 2.6 13 106      PTT - No results in last year.  _   _ _     Troponin I   Date/Time Value Ref Range Status   09/27/2023 11:14 AM 4 0 - 20 ng/L Final     Comment:     .  Less than 99th percentile of normal range cutoff-  Female and children under 18 years old <14 ng/L; Male <21 ng/L: Negative  Repeat testing should be performed if clinically indicated.   .  Female and children under 18 years old 14-50 ng/L; Male 21-50 ng/L:  Consistent with possible cardiac damage and possible increased clinical   risk. Serial measurements may help to assess extent of myocardial damage.   .  >50 ng/L: Consistent with cardiac damage, increased clinical risk and  myocardial infarction. Serial measurements may help assess extent of   myocardial damage.   .   NOTE: Children less than 1 year old may have higher baseline troponin   levels and results should be interpreted in conjunction with the overall   clinical  context.   .  NOTE: Troponin I testing is performed using a different   testing methodology at [facility] than at other   [facility] hospitals. Direct result comparisons should only   be made within the same method.       BNP   Date/Time Value Ref Range Status   09/27/2023 11:12 AM 7 0 - 99 pg/mL Final     Comment:     .  <100 pg/mL - Heart failure unlikely  100-299 pg/mL - Intermediate probability of acute heart  .               failure exacerbation. Correlate with clinical  .               context and patient history.    >=300 pg/mL - Heart Failure likely. Correlate with clinical  .               context and patient history.  BNP testing is performed using different testing   methodology at [facility] than at other   [facility] hospitals. Direct result comparisons should   only be made within the same method.       VLDL   Date/Time Value Ref Range Status   04/27/2022 09:06 AM 28 0 - 40 mg/dL Final   04/23/2021 11:49 AM 20 0 - 40 mg/dL Final      Last I/O:  I/O last 3 completed shifts:  In: 300 (1.7 mL/kg) [IV Piggyback:300]  Out: - (0 mL/kg)   Weight: 172.7 kg     Past Cardiology Tests (Last 3 Years):  EKG:  EKG with sinus tachycardia nonspecific ST-T changes    Echo:  Transthoracic Echo (TTE) Complete 9/30/2023    Ejection Fractions:  Ejection action 60 to 65%  Cath:  No results found for this or any previous visit from the past 1095 days.    Stress Test:  No results found for this or any previous visit from the past 1095 days.    Cardiac Imaging:  No results found for this or any previous visit from the past 1095 days.      Past Medical History:  He has a past medical history of Abnormal levels of other serum enzymes, Lateral epicondylitis, right elbow, Other hypertrophic disorders of the skin, Personal history of other diseases of the nervous system and sense organs, Personal history of other diseases of the respiratory system, Personal history of other specified conditions, Plantar fascial  fibromatosis, and Unspecified asthma, uncomplicated.    Past Surgical History:  He has a past surgical history that includes Other surgical history (09/22/2015).      Social History:  He reports that he has never smoked. He has never used smokeless tobacco. He reports that he does not drink alcohol and does not use drugs.    Family History:  No family history of early coronary disease  Allergies:  Patient has no known allergies.    Inpatient Medications:  Scheduled medications   Medication Dose Route Frequency    fluticasone furoate-vilanteroL  1 puff inhalation Daily    heparin (porcine)  7,500 Units subcutaneous q8h    metoprolol succinate XL  25 mg oral Daily    morphine        piperacillin-tazobactam  4.5 g intravenous q6h    vancomycin  1,250 mg intravenous q12h     PRN medications   Medication    acetaminophen    albuterol    morphine    morphine     Continuous Medications   Medication Dose Last Rate    sodium chloride 0.9%  100 mL/hr       Outpatient Medications:  Current Outpatient Medications   Medication Instructions    albuterol 90 mcg/actuation inhaler inhalation    colchicine 0.6 mg tablet TAKE 2 TABLETS BY MOUTH AT FIRST SIGN OF ATTACK  THEN TAKE 1 TABLET 1 HOUR AFTER THE FIRST DOSE.  MAXIMUM DOSE IS 1.8 MG (3 TABLETS) WITHIN    lisinopriL-hydrochlorothiazide 20-12.5 mg tablet TAKE TWO TABLETS BY MOUTH DAILY AS DIRECTED    Symbicort 160-4.5 mcg/actuation inhaler 2 puffs, inhalation, 2 times daily RT       Physical Exam:  Physical Exam  Constitutional:       General: He is not in acute distress.     Appearance: He is obese.   Neck:      Vascular: No carotid bruit, hepatojugular reflux or JVD.   Cardiovascular:      Rate and Rhythm:  Regular rhythm with increased rate     Pulses:  decreased pulses.      Heart sounds: Normal heart sounds.  legs are wrapped and edematous      Pulmonary:      Effort: Pulmonary effort is normal.   Abdominal:      General: Bowel sounds are normal.   Musculoskeletal:       Comments: Normal strength   Skin:     General: Skin is warm and dry.   Neurological:      General: No focal deficit present.      Mental Status: He is alert.   Psychiatric:         Attention and Perception: Attention normal.              Assessment/Plan    1.  Sepsis   2.  Cellulitis   3.  Anemia   4.  Sinus tachycardia  - 42-year-old male with history of hypertension presented to hospital with edematous painful legs was diagnosed with cellulitis/sepsis.  Patient with persistent sinus tachycardia- this is likely the result of infection and anemia- echo with normal LV systolic function.  Continue treatment for his infection and anemia.  We will continue to monitor with you.  .      Code Status:  No Order            Rudolph Persaud DO

## 2023-09-30 NOTE — H&P
History of Present Illness:   HPI:    TYRON WOOTEN is a 42 year old Male  with a past medical history of hypertension,  hyperlipidemia, gout, and MICHAEL who presented today with bilateral lower extremity pain.  Reports over the past few months he has been experiencing progressively worsening bilateral lower extremity pain,  edema, erythema, warmth, and drainage of serous fluid.  He reports the pain got so bad that he decided to come to the emergency room and reports the pain is 10 out of 10 with ambulation due to the extreme tenderness of his legs and 6 out of 10 at rest.   He admits to associated fatigue and shortness of breath with exertion.    In the ED lab work, EKG, venous duplex, and chest x-ray were performed.  Labs revealed white blood cell count 12.4, new anemia from April 2022 with red blood cell count 2.43, hemoglobin 8.3, and hematocrit 25.3, neutrophil count 9.78, sodium 131 (136  on January 10, 2023), calcium 7.5, mag 1.16, alk phos 126 (previously within normal limits), lactate 4.  Imaging was negative within the limits of the exams.  He arrived with a heart rate of 122, and respirations of 26, vitals otherwise stable.  He was  given magnesium, Zosyn, Vanco, and blood cultures were performed.  Patient was admitted for further evaluation and treatment.    Past medical history: As above  Past surgical history: Denies  Family history: CVA  Social history: Denies smoking or illicit drug use, reports he drinks 2 beers per night.  Lives with brother.   A 10 point review of systems has been performed and is negative besides what is stated in HPI.        Comorbidities:   Comorbidites:  ·  Comorbid Conditions hypertension     Social History:   Social History:  Smoking Status never smoker (1)   Alcohol Use denies(1)              Allergies:  ·  No Known Allergies :     Medications Prior to Admission:     budesonide-formoterol 160 mcg-4.5 mcg/inh inhalation aerosol: 2 puff(s) inhaled 2 times a day, As  Needed (Symbicort)  lisinopril-hydrochlorothiazide 20 mg-12.5 mg oral tablet: 2 tab(s) orally once a day (in the morning).    Objective:     Objective Information:      T   P  R  BP   MAP  SpO2   Value  36.6  126  20  97/49   68  97%  Date/Time 9/27 17:35 9/27 17:35 9/27 17:35 9/27 17:35  9/27 17:35 9/27 17:35  Range  (36.6C - 37C )  (115 - 126 )  (20 - 26 )  (97 - 116 )/ (49 - 74 )  (68 - 68 )  (97% - 100% )  Highest temp of 37 C was recorded at 9/27 9:44      Pain reported at 9/27 18:57: 2 = Mild    Physical Exam by System:    Constitutional: Well developed, awake/alert/oriented  x3, no distress, alert and cooperative   Eyes: clear sclera   ENMT: mucous membranes moist, no apparent injury,  no lesions seen   Head/Neck: Neck supple, no apparent injury   Respiratory/Thorax: Patent airways, CTAB, normal  breath sounds with good chest expansion, thorax symmetric   Cardiovascular: Regular rate and rhythm, no murmurs,  2+ equal pulses of the extremities, normal S 1and S 2   Gastrointestinal: distended, firm, non-tender, no  rebound tenderness or guarding, no masses palpable, no organomegaly, +BS   Musculoskeletal: ROM limited ble, no joint swelling,  normal strength   Extremities: normal extremities, no cyanosis, see  skin   Neurological: alert and oriented, intact motor response,  normal strength   Lymphatic: No significant lymphadenopathy   Psychological: Appropriate mood and behavior   Skin: Bilateral lower extremities erythematous, extremely  tender, warm, edematous, with missing epidermal layer with moist appearance     Medications:    Medications:          Continuous Medications       --------------------------------    1. Sodium Chloride 0.9% Infusion:  1000  mL  IntraVenous  <Continuous>         Scheduled Medications       --------------------------------    1. Fluticasone 200 microgram - Vilanterol 25 microgram/ Inh:  1  inhalation  Inhalation  Every 24 Hours    2. Heparin SubCutaneous:  7500  unit(s)   SubCutaneous  Every 8 Hours    3. Piperacillin - Tazobactam 4.5 gram/Iso-osmotic 100 mL Premix IVPB:  100  mL  IntraVenous Piggyback  Every 6 Hours    4. Vancomycin - RPh to Dose - IV Piggy Back:  1  each  As Specified  Variable         PRN Medications       --------------------------------    1. Acetaminophen:  650  mg  Oral  Every 4 Hours    2. Morphine Injectable:  4  mg  IntraVenous Push  Every 4 Hours    3. Sodium Chloride 0.9% Injectable Flush:  10  mL  IntraVenous Flush  Every 8 Hours and as Needed        Recent Lab Results:    Results:    CBC: 9/27/2023 11:12              \     Hgb     /                              \     8.3 L    /  WBC  ----------------  Plt               12.4 H    ----------------    373              /     Hct     \                              /     25.8 L    \            RBC: 2.43 L    MCV: 106 H    Neutrophil  %: 78.9      CMP: 9/27/2023 11:12  NA+        Cl-     BUN  /                         131 L   92 L    14  /  --------------------------------  Glucose                ---------------------------  91    K+     HCO3-   Creat \                         4.9    26    1.14  \           \  T Bili  /                    \  0.8  /  AST  x ---- x ALT        31 x ---- x 15         /  Alk P   \               /  126 H \  Calcium : 7.5 L    Anion Gap : 18     Albumin : 2.7 L     T Protein : 5.6 L          Assessment and Plan:   Assessment:    TYRON WOOTEN is a 42 year old Male with a past medical history of hypertension, hyperlipidemia,  gout, and MICHAEL who presented today with bilateral lower extremity pain.     A/P:    1. BLE Cellulitis  Sepsis  Hyponatremia  Hypomagnesemia  Elevated alk phops    admit to tele  INPT  appreciate podiatry recommendations  Continue Vanco and Zosyn  Wound culture  CBC, CMP, magnesium in a.m.  Hold home blood pressure medications  Give 1 L IV fluid bolus then 100 cc/h  Repeat lactate pending  EKG pending  Morphine      2.  Shortness of breath on  "exertion  Anemia    Guaiac stool  Add BNP  CBC in a.m.    3.  Chronic conditions: Hypertension, hyperlipidemia, gout, MICHAEL    Continue home medications as listed above holding home ACE/hydrochlorothiazide due to sepsis    4.  DVT prophylaxis    Heparin            Electronic Signatures:  Erik Avila ()  (Signed 27-Sep-2023 19:41)   Authored: History of Present Illness, Comorbidities,  Social History, Allergies, Medications Prior to Admission, Objective, Assessment and Plan, Note Completion      Last Updated: 27-Sep-2023 19:41 by Erik Avila (DO)    References:  1.  Data Referenced From \"History and Physical\" 27-Sep-2023 14:03   "

## 2023-09-30 NOTE — PROGRESS NOTES
Service: General Internal Medicine     Subjective Data:   TYRON WOOTEN is a 42 year old Male who is Hospital Day # 2.    Additional Information:    No events overnight. Patient seen and examined at bedside. No complaints. Feels legs are getting better.    Objective Data:     Objective Information:      T   P  R  BP   MAP  SpO2   Value  36.3  123  18  112/54   78  97%  Date/Time 9/28 18:48 9/28 18:48 9/28 10:26 9/28 18:48  9/28 18:48 9/28 18:48  Range  (36.2C - 37C )  (109 - 126 )  (18 - 26 )  (97 - 119 )/ (49 - 74 )  (68 - 78 )  (95% - 100% )  Highest temp of 37 C was recorded at 9/27 9:44      Pain reported at 9/28 18:40: 7 = Severe    ---- Intake and Output  -----  Mn/Dy/Year Time  Intake   Output  Net  Sep 28, 2023 2:00 pm  0   380  -380  Sep 28, 2023 6:00 am  900   0  900    The Intake and Output Totals for the last 24 hours are:      Intake   Output  Net      900   null  null    Physical Exam by System:    Constitutional: Well developed, awake/alert/oriented  x3, no distress, alert and cooperative   Eyes: clear sclera   ENMT: mucous membranes moist, no apparent injury,  no lesions seen   Head/Neck: Neck supple, no apparent injury   Respiratory/Thorax: Patent airways, CTAB, normal  breath sounds with good chest expansion, thorax symmetric   Cardiovascular: Regular rate and rhythm, no murmurs,  2+ equal pulses of the extremities, normal S 1and S 2   Gastrointestinal: distended, firm, non-tender, no  rebound tenderness or guarding, no masses palpable, no organomegaly, +BS   Musculoskeletal: ROM limited ble, no joint swelling,  normal strength   Extremities: normal extremities, no cyanosis, see  skin   Neurological: alert and oriented, intact motor response,  normal strength   Lymphatic: No significant lymphadenopathy   Psychological: Appropriate mood and behavior   Skin: Bilateral lower extremities erythematous, extremely  tender, warm, edematous, with missing epidermal layer with moist appearance      Medication:    Medications:          Continuous Medications       --------------------------------    1. Sodium Chloride 0.9% Infusion:  1000  mL  IntraVenous  <Continuous>         Scheduled Medications       --------------------------------    1. Fluticasone 200 microgram - Vilanterol 25 microgram/ Inh:  1  inhalation  Inhalation  Every 24 Hours    2. Piperacillin - Tazobactam 4.5 gram/Iso-osmotic 100 mL Premix IVPB:  100  mL  IntraVenous Piggyback  Every 6 Hours    3. Vancomycin - RPh to Dose - IV Piggy Back:  1  each  As Specified  Variable    4. Vancomycin 1 gram IVPB/ Premixed Soln 200 mL:  1  gram(s)  IntraVenous Piggyback  Every 12 Hours         PRN Medications       --------------------------------    1. Acetaminophen:  650  mg  Oral  Every 4 Hours    2. Morphine Injectable:  4  mg  IntraVenous Push  Every 4 Hours    3. Sodium Chloride 0.9% Injectable Flush:  10  mL  IntraVenous Flush  Every 8 Hours and as Needed           Currently Suspended Medications       --------------------------------    1. Heparin SubCutaneous:  7500  unit(s)  SubCutaneous  Every 8 Hours      Recent Lab Results:    Results:    CBC: 9/28/2023 06:51              \     Hgb     /                              \     7.2 L    /  WBC  ----------------  Plt               7.5       ----------------    306              /     Hct     \                              /     22.7 L    \            RBC: 2.11 L    MCV: 108 H          CMP: 9/28/2023 06:51  NA+        Cl-     BUN  /                         130 L   94 L    20  /  --------------------------------  Glucose                ---------------------------  96    K+     HCO3-   Creat \                         4.8    25    1.63 H \           \  T Bili  /                    \  1.1  /  AST  x ---- x ALT        26 x ---- x 13         /  Alk P   \               /  119  \  Calcium : 8.1 L    Anion Gap : 16     Albumin : 2.5 L     T Protein : 5.1 L          Assessment and Plan:    Comorbidities:  ·  Comorbidity acute kidney injury, anemia, obesity   ·  Kidney acute tubular necrosis   ·  Anemia anemia of chronic disease   ·  Obesity morbid obesity (BMI 40+)   ·  BMI 63.36     Code Status:  ·  Code Status Full Code     Assessment:    TYRON WOOTEN is a 42 year old Male with a past medical history of hypertension, hyperlipidemia,  gout, and MICHAEL who presented today with bilateral lower extremity pain.     A/P:    1. BLE Cellulitis  Sepsis  Hyponatremia  Hypomagnesemia  Elevated alk phops    admit to tele  INPT  appreciate podiatry recommendations -- OR for debridement tomorrow  Continue Vanco and Zosyn  Wound culture  CBC, CMP, magnesium in a.m.  Hold home blood pressure medications  Give 1 L IV fluid bolus then 100 cc/h  Repeat lactate pending  Morphine as needed      2.  Shortness of breath on exertion  Anemia    Guaiac stool  Add BNP  CBC in a.m.    3.  Chronic conditions: Hypertension, hyperlipidemia, gout, MICHAEL    Continue home medications as listed above holding home ACE/hydrochlorothiazide due to sepsis    4.  DVT prophylaxis    Heparin    Dispo: anticipate SNF for IV antibiotics and wound care      Electronic Signatures:  Erik Avila)  (Signed 28-Sep-2023 19:34)   Authored: Service, Subjective Data, Objective Data, Assessment  and Plan, Note Completion      Last Updated: 28-Sep-2023 19:34 by Erik Avila ()

## 2023-09-30 NOTE — PROGRESS NOTES
HPI:  Patient is a 42 year-old  male with PMHx of HTN and asthma, currently admitted for b/l leg ulcers. POD #1 for excisional debridement of b/l lower leg venous ulcers. Seen and evaluated at bedside. Dressings intact b/l with outer ACE wraps slightly wet. He states he has pain along the wounds since yesterday's surgery, although this pain is different from his previous pain. He finds pain medications are improving his pain level. Denies acute pedal complaints. Denies F/C/N/V/SOB. He has been eating regular meals and drinking water.      ROS: Negative except as per HPI.       Physical Exam:    Vitals:    09/30/23 1000   BP: 116/58   Pulse: (!) 119   Resp: 18   Temp: 36.8 °C (98.2 °F)   SpO2: 94%     General: No acute distress. Cooperative. Post-op dressings intact b/l.    Vascular: DP and PT pulses palpable b/l. Severe non-pitting edema b/l, L > R, improved from admission. Hair growth present b/l.     Derm: Toenails 1-5 b/l thickened, elongated, yellow. Webspaces 1-4 b/l clean, dry, intact.      Left:   Large circumferential venous ulcer spanning entire lower leg. Mixed granular and fibrotic base. No overlying slough. Serous drainage. No purulence. Mild malodor. Mild bleeding with dressing removal. Severe pain with dressing removal and light touch. No crepitation or fluctuance. No lymphatic streaking.    Right:  Large venous ulcer along anterior and medial leg. Mixed granular and fibrotic base. Ulcer overall appears improved, likely due to decreased edema from compressive wraps. No overlying slough. Serous drainage. No purulence. No malodor. Mild bleeding with dressing removal. Severe pain with dressing removal and light touch. No crepitation or fluctuance. No lymphatic streaking.     Neuro: Sensation to light touch intact b/l. Severe pain with light touch and dressing change.     MSK: Active ROM of ankles b/l and digits b/l intact. Able to lift b/l LE during dressing change, but limited due to severe pain  of ulcers.      Resp: Unlabored breathing.     Psych: Normal mood and affect.       Assessment:   - Chronic venous insufficiency  - Chronic non-pressure ulcers to level of subcutaneous tissue, bilateral lower extremities  - Chronic non-pressure ulcer to level of subcutaneous tissue, right foot    Patient is a 42 year-old  male with PMHx of HTN and asthma. He is currently admitted for venous ulcerations along b/l LE. No evaluation of these ulcers prior to current admission. POD #1 for excisional debridement of b/l LE ulcers. Post-op dressings were intact but slightly wet due to drainage from ulcers.     WBC 8.1 (uptrending).   Lactate 1.5 (downtrending).  CRP 9.71.   .  Albumin 2.6   A1c pending.  Left leg post-op wound cx: negative gram stain, culture in progress.  Right leg post-op wound cx: cannot access currently.  PVR b/l: No evidence of arterial occlusive disease.   Venous US: Negative for acute DVT. Could not rule out thrombus in peroneal and PT veins due to open wounds.   Abx: IV vancomycin, IV Zosyn  Pain: Tylenol 650mg, morphine  DVT ppx: heparin 7500 units      Plan:  - Patient seen and evaluated at bedside. All findings discussed. All questions answered.   - Awaiting final culture results.   - Dressings b/l: Adaptic, calcium alginate, ABD x 4, Kerlix, ACE x 2.   - Continue daily dressing changes.   - For nursing: Please reinforce or change as needed.   - Podiatry will continue following.       This patient was discussed with the attending, Dr. Libra Smalls DPM. Please wait for attending's signature for finalization of this note.     Pat Garg DPM/PGY1  Podiatric Medicine and Surgery

## 2023-09-30 NOTE — PROGRESS NOTES
"Vancomycin Dosing by Pharmacy- FOLLOW UP    Chris Chance is a 42 y.o. year old male who Pharmacy has been consulted for vancomycin dosing for cellulitis, skin and soft tissue. Based on the patient's indication and renal status this patient is being dosed based on a goal AUC of 400-600.     Renal function is currently improving.    Current vancomycin dose: 1000 mg given every 12 hours    Estimated vancomycin AUC on current dose: 346 mg/L.hr     Visit Vitals  /58 (BP Location: Left arm, Patient Position: Lying)   Pulse (!) 119   Temp 36.8 °C (98.2 °F) (Temporal)   Resp 18        Lab Results   Component Value Date    CREATININE 0.98 09/30/2023    CREATININE 1.16 09/29/2023    CREATININE 1.63 (H) 09/28/2023    CREATININE 1.14 09/27/2023    CREATININE 0.74 01/10/2023      No results found for: \"CULTURE\"     I/O last 3 completed shifts:  In: 300 (1.7 mL/kg) [IV Piggyback:300]  Out: - (0 mL/kg)   Weight: 172.7 kg   [unfilled]    No results found for: \"PATIENTTEMP\"     Assessment/Plan    Below goal AUC. Orders placed for new vancomcyin regimen of 1250 every 12 hours to begin at 1400.     This dosing regimen is predicted by [employer] to result in the following pharmacokinetic parameters:  AUC24,ss: 432 mg/L.hr  Probability of AUC24 > 400: 85 %  Ctrough,ss: 12.9 mg/L  Probability of Ctrough,ss > 20: 0 %  Probability of nephrotoxicity (Lodise BLAKE 2009): 8 %    The next level will be obtained on 10/1 at 1100. May be obtained sooner if clinically indicated.   Will continue to monitor renal function daily while on vancomycin and order serum creatinine at least every 48 hours if not already ordered.  Follow for continued vancomycin needs, clinical response, and signs/symptoms of toxicity.       Kim Cote, PharmD           "

## 2023-09-30 NOTE — PROGRESS NOTES
Service: General Internal Medicine     Subjective Data:   TYRON WOOTEN is a 42 year old Male who is Hospital Day # 3 and POD #0 for 1. BILATERAL FOOT DEBRIDEMENT;    Additional Information:    No events overnight. Patient seen and examined at bedside. No complaints. Going to surgery.     Objective Data:     Objective Information:      T   P  R  BP   MAP  SpO2   Value  36.2  113  17  100/55   74  96%  Date/Time 9/29 10:54 9/29 10:54 9/29 10:54 9/29 10:54  9/29 10:54 9/29 10:54  Range  (36C - 36.4C )  (109 - 123 )  (16 - 19 )  (100 - 119 )/ (49 - 59 )  (71 - 78 )  (95% - 98% )      Pain reported at 9/29 15:00: 4 = Moderate  The Intake and Output Totals for the last 24 hours are:      Intake   Output  Net      null   380  null    Physical Exam by System:    Constitutional: Well developed, awake/alert/oriented  x3, no distress, alert and cooperative   Eyes: clear sclera   ENMT: mucous membranes moist, no apparent injury,  no lesions seen   Head/Neck: Neck supple, no apparent injury   Respiratory/Thorax: Patent airways, CTAB, normal  breath sounds with good chest expansion, thorax symmetric   Cardiovascular: Regular rate and rhythm, no murmurs,  2+ equal pulses of the extremities, normal S 1and S 2   Gastrointestinal: distended, firm, non-tender, no  rebound tenderness or guarding, no masses palpable, no organomegaly, +BS   Musculoskeletal: ROM limited ble, no joint swelling,  normal strength   Extremities: normal extremities, no cyanosis, see  skin   Neurological: alert and oriented, intact motor response,  normal strength   Lymphatic: No significant lymphadenopathy   Psychological: Appropriate mood and behavior   Skin: Bilateral lower extremities erythematous, extremely  tender, warm, edematous, with missing epidermal layer with moist appearance     Recent Lab Results:    Results:    CBC: 9/29/2023 06:29              \     Hgb     /                              \     6.9 L    /  WBC  ----------------  Plt                6.2       ----------------    274              /     Hct     \                              /     22.5 L    \            RBC: 2.07 L    MCV: 109 H          CMP: 9/29/2023 06:29  NA+        Cl-     BUN  /                         132 L   96 L    17  /  --------------------------------  Glucose                ---------------------------  91    K+     HCO3-   Creat \                         4.6    26    1.16  \           \  T Bili  /                    \  0.7  /  AST  x ---- x ALT        26 x ---- x 12         /  Alk P   \               /  104  \  Calcium : 7.9 L    Anion Gap : 15     Albumin : 2.5 L     T Protein : 5.2 L          Assessment and Plan:   Comorbidities:  ·  Comorbidity acute kidney injury, anemia, obesity   ·  Kidney acute tubular necrosis   ·  Anemia anemia of chronic disease   ·  Obesity morbid obesity (BMI 40+)   ·  BMI 63.36     Code Status:  ·  Code Status Full Code     Assessment:    TYRON WOOTEN is a 42 year old Male with a past medical history of hypertension, hyperlipidemia,  gout, and MICHAEL who presented today with bilateral lower extremity pain.     A/P:    1. BLE Cellulitis  Sepsis  Hyponatremia  Hypomagnesemia  Elevated alk phops    admit to tele  INPT  appreciate podiatry recommendations -- OR for debridement 9/29  Continue Vanco and Zosyn  Wound culture  CBC, CMP, magnesium in a.m.  Hold home blood pressure medications  Give 1 L IV fluid bolus then 100 cc/h  Repeat lactate pending  Morphine as needed      2.  Shortness of breath on exertion  Anemia    Transfuse 1 unit pRBCs 9/29  Guaiac stool  Add BNP  CBC in a.m.      3.  Chronic conditions: Hypertension, hyperlipidemia, gout, MICHAEL    Continue home medications as listed above holding home ACE/hydrochlorothiazide due to sepsis    4.  DVT prophylaxis    Heparin    Dispo: anticipate SNF for IV antibiotics and wound care      Electronic Signatures:  Erik Avila)  (Signed 29-Sep-2023 15:12)   Authored:  Service, Subjective Data, Objective Data, Assessment  and Plan, Note Completion      Last Updated: 29-Sep-2023 15:12 by Erik Avila (DO)

## 2023-09-30 NOTE — H&P
History of Present Illness:   HPI:    TYRON WOOTEN is a 42 year old Male with a past medical history of hypertension,  hyperlipidemia, gout, and MICHAEL who presented today with bilateral lower extremity pain.  Reports over the past few months he has been experiencing progressively worsening bilateral lower extremity pain,  edema, erythema, warmth, and drainage of serous fluid.  He reports the pain got so bad that he decided to come to the emergency room and reports the pain is 10 out of 10 with ambulation due to the extreme tenderness of his legs and 6 out of 10 at rest.   He admits to associated fatigue and shortness of breath with exertion.    In the ED lab work, EKG, venous duplex, and chest x-ray were performed.  Labs revealed white blood cell count 12.4, new anemia from April 2022 with red blood cell count 2.43, hemoglobin 8.3, and hematocrit 25.3, neutrophil count 9.78, sodium 131 (136  on January 10, 2023), calcium 7.5, mag 1.16, alk phos 126 (previously within normal limits), lactate 4.  Imaging was negative within the limits of the exams.  He arrived with a heart rate of 122, and respirations of 26, vitals otherwise stable.  He was  given magnesium, Zosyn, Vanco, and blood cultures were performed.  Patient was admitted for further evaluation and treatment.      Past medical history: As above  Past surgical history: Denies  Family history: CVA  Social history: Denies smoking or illicit drug use, reports he drinks 2 beers per night.  Lives with brother.   A 10 point review of systems has been performed and is negative besides what is stated in HPI.        Comorbidities:   Comorbidites:  ·  Comorbid Conditions hypertension     Social History:   Social History:  Smoking Status never smoker (1)   Alcohol Use denies(1)              Allergies:  ·  No Known Allergies :     Medications Prior to Admission:     budesonide-formoterol 160 mcg-4.5 mcg/inh inhalation aerosol: 2 puff(s) inhaled 2 times a day, As  Needed (Symbicort)  lisinopril-hydrochlorothiazide 20 mg-12.5 mg oral tablet: 2 tab(s) orally once a day (in the morning).    Objective:     Objective Information:      T   P  R  BP   MAP  SpO2   Value  37  122  26  116/60      99%  Date/Time 9/27 9:44 9/27 9:44 9/27 9:44 9/27 9:44    9/27 9:44  Range  (37C - 37C )  (122 - 122 )  (26 - 26 )  (116 - 116 )/ (60 - 60 )    (99% - 99% )  Highest temp of 37 C was recorded at 9/27 9:44      Pain reported at 9/27 9:44: 8 = Severe    Physical Exam by System:    Constitutional: Well developed, awake/alert/oriented  x3, no distress, alert and cooperative   Eyes: clear sclera   ENMT: mucous membranes moist, no apparent injury,  no lesions seen   Head/Neck: Neck supple, no apparent injury   Respiratory/Thorax: Patent airways, CTAB, normal  breath sounds with good chest expansion, thorax symmetric   Cardiovascular: Regular rate and rhythm, no murmurs,  2+ equal pulses of the extremities, normal S 1and S 2   Gastrointestinal: distended, firm, non-tender, no  rebound tenderness or guarding, no masses palpable, no organomegaly, +BS   Musculoskeletal: ROM limited ble, no joint swelling,  normal strength   Extremities: normal extremities, no cyanosis, see  skin   Neurological: alert and oriented, intact motor response,  normal strength   Lymphatic: No significant lymphadenopathy   Psychological: Appropriate mood and behavior   Skin: Bilateral lower extremities erythematous, extremely  tender, warm, edematous, with missing epidermal layer with moist appearance     Medications:    Medications:          Continuous Medications       --------------------------------    1. Sodium Chloride 0.9% Infusion:  1000  mL  IntraVenous  <Continuous>         Scheduled Medications       --------------------------------    1. Fluticasone 200 microgram - Vilanterol 25 microgram/ Inh:  1  inhalation  Inhalation  Every 24 Hours    2. Heparin SubCutaneous:  7500  unit(s)  SubCutaneous  Every 8 Hours    3.  Piperacillin - Tazobactam 4.5 gram/Iso-osmotic 100 mL Premix IVPB:  100  mL  IntraVenous Piggyback  Every 6 Hours    4. Vancomycin - RPh to Dose - IV Piggy Back:  1  each  As Specified  Variable         PRN Medications       --------------------------------    1. Acetaminophen:  650  mg  Oral  Every 4 Hours    2. Morphine Injectable:  4  mg  IntraVenous Push  Every 4 Hours    3. Sodium Chloride 0.9% Injectable Flush:  10  mL  IntraVenous Flush  Every 8 Hours and as Needed        Recent Lab Results:    Results:    CBC: 9/27/2023 11:12              \     Hgb     /                              \     8.3 L    /  WBC  ----------------  Plt               12.4 H    ----------------    373              /     Hct     \                              /     25.8 L    \            RBC: 2.43 L    MCV: 106 H    Neutrophil  %: 78.9      CMP: 9/27/2023 11:12  NA+        Cl-     BUN  /                         131 L   92 L    14  /  --------------------------------  Glucose                ---------------------------  91    K+     HCO3-   Creat \                         4.9    26    1.14  \           \  T Bili  /                    \  0.8  /  AST  x ---- x ALT        31 x ---- x 15         /  Alk P   \               /  126 H \  Calcium : 7.5 L    Anion Gap : 18     Albumin : 2.7 L     T Protein : 5.6 L            I have reviewed these laboratory results:    Troponin I, High Sensitivity  27-Sep-2023 11:14:00      Result Value    Troponin I, High Sensitivity  4      Lactate, Level  27-Sep-2023 11:13:00      Result Value    Lab Comment:  [facility]   Called- RB to SAMANTHA NOLAN, 09/27/2023 12:25    Lactate, Level  4.0   HH     Magnesium, Serum  27-Sep-2023 11:12:00      Result Value    Magnesium, Serum  1.16   L     Brain Natriuretic Peptide  27-Sep-2023 11:12:00      Result Value    Brain Natriuretic Peptide  7        Radiology Results:    Results:        Conclusion:  Preliminary Cardiology Report    [facility]  [address]  [place], [place] [address]  Tel [phone] and Fax [phone]      Preliminary Vascular Lab Report    Lower Venous Duplex Ultrasound      Patient Name:     TYRON WOOTEN ReadingPhysician:  [id number] Angie Palomino MD  Study Date:       9/27/2023         Referring           ARA ALVAREZ  Physician:  MRN/PID:          [id number]          PCP:  Accession/Order#: [id number]         CC Report to:  YOB: 1980 Technologist:       Freida Lacey RVT  Gender:           M                 Technologist 2:  Admission Status: Emergency         Location Performed: [facility]      Diagnosis/ICD: M79.89-Other specified soft tissue disorders  Procedure/CPT: 18215 Peripheral venous duplex scan for DVT complete-[id number]      PRELIMINARY CONCLUSIONS:  Right Lower Venous: No evidence of acute deep vein thrombus visualized in the right lower extremity. Cannot rule out thrombus in non-visualized Peroneal and posterior tibial veins due to open wounds.  Left Lower Venous: No evidence of acute deep vein thrombus visualized in the left lower extremity. Cannot rule out thrombus in non-visualized peroneal and posterior tibial veins due to open wounds.    Imaging & Doppler Findings:    Right                 Compressible Thrombus        Flow  Distal External Iliac     Yes        None   Spontaneous/Phasic  CFV                       Yes        None   Spontaneous/Phasic  PFV                       Yes        None  FV Proximal               Yes        None   Spontaneous/Phasic  FV Mid                    Yes        None  FV Distal                 Yes        None  Popliteal                 Yes        None   Spontaneous/Phasic      Left                  Compress Thrombus        Flow  Distal External Iliac   Yes      None   Spontaneous/Phasic  CFV                     Yes      None   Spontaneous/Phasic  PFV                     Yes      None  FV Proximal             Yes      None   Spontaneous/Phasic  FV Mid           Yes       "None  FV Distal               Yes      None  Popliteal               Yes      None   Spontaneous/Phasic      VASCULAR PRELIMINARY REPORT  completed by Freida Lacey RVT on 9/27/2023 at 12:01:34 PM        *** Preliminary ***     [facility] LAB Venous Duplex Ultrasound for DVT [Sep 27 2023 12:01PM]      Impression:    No focal infiltrate or pneumothorax is identified.     MACRO:  None.     Xray Chest 1 View [Sep 27 2023 11:20AM]      Assessment and Plan:   Assessment:    TYRON WOOTEN is a 42 year old Male with a past medical history of hypertension, hyperlipidemia,  gout, and MICHAEL who presented today with bilateral lower extremity pain.     A/P:    1. BLE Cellulitis  Sepsis  Hyponatremia  Hypomagnesemia  Elevated alk phops    admit to tele  INPT  appreciate podiatry recommendations  Continue Vanco and Zosyn  Wound culture  CBC, CMP, magnesium in a.m.  Hold home blood pressure medications  Give 1 L IV fluid bolus then 100 cc/h  Repeat lactate pending  EKG pending  Morphine      2.  Shortness of breath on exertion  Anemia    Guaiac stool  Add BNP  CBC in a.m.    3.  Chronic conditions: Hypertension, hyperlipidemia, gout, MICHAEL    Continue home medications as listed above holding home ACE/hydrochlorothiazide due to sepsis    4.  DVT prophylaxis    Heparin            Electronic Signatures:  Sujey Tamayo (APRN-CNP)  (Signed 27-Sep-2023 14:24)   Authored: History of Present Illness, Comorbidities,  Social History, Allergies, Medications Prior to Admission, Objective, Assessment and Plan, Note Completion      Last Updated: 27-Sep-2023 14:24 by Sujey Tamayo (APRN-CNP)    References:  1.  Data Referenced From \"Patient Profile - Adult v2\" 27-Sep-2023 13:29   "

## 2023-10-01 LAB
ALBUMIN SERPL BCP-MCNC: 2.6 G/DL (ref 3.4–5)
ALP SERPL-CCNC: 98 U/L (ref 33–120)
ALT SERPL W P-5'-P-CCNC: 12 U/L (ref 10–52)
ANION GAP SERPL CALC-SCNC: 12 MMOL/L (ref 10–20)
AST SERPL W P-5'-P-CCNC: 30 U/L (ref 9–39)
BILIRUB SERPL-MCNC: 0.6 MG/DL (ref 0–1.2)
BLOOD CULTURE: NORMAL
BLOOD CULTURE: NORMAL
BUN SERPL-MCNC: 8 MG/DL (ref 6–23)
CALCIUM SERPL-MCNC: 8 MG/DL (ref 8.6–10.3)
CHLORIDE SERPL-SCNC: 103 MMOL/L (ref 98–107)
CO2 SERPL-SCNC: 25 MMOL/L (ref 21–32)
CREAT SERPL-MCNC: 1.17 MG/DL (ref 0.5–1.3)
ERYTHROCYTE [DISTWIDTH] IN BLOOD BY AUTOMATED COUNT: 15.4 % (ref 11.5–14.5)
EST. AVERAGE GLUCOSE BLD GHB EST-MCNC: 105 MG/DL
GFR SERPL CREATININE-BSD FRML MDRD: 80 ML/MIN/1.73M*2
GLUCOSE SERPL-MCNC: 96 MG/DL (ref 74–99)
HBA1C MFR BLD: 5.3 %
HCT VFR BLD AUTO: 24.6 % (ref 41–52)
HGB BLD-MCNC: 7.6 G/DL (ref 13.5–17.5)
MCH RBC QN AUTO: 33.2 PG (ref 26–34)
MCHC RBC AUTO-ENTMCNC: 30.9 G/DL (ref 32–36)
MCV RBC AUTO: 107 FL (ref 80–100)
NRBC BLD-RTO: 0 /100 WBCS (ref 0–0)
PLATELET # BLD AUTO: 249 X10*3/UL (ref 150–450)
PMV BLD AUTO: 8.1 FL (ref 7.5–11.5)
POTASSIUM SERPL-SCNC: 4.2 MMOL/L (ref 3.5–5.3)
PROT SERPL-MCNC: 5.2 G/DL (ref 6.4–8.2)
RBC # BLD AUTO: 2.29 X10*6/UL (ref 4.5–5.9)
SODIUM SERPL-SCNC: 136 MMOL/L (ref 136–145)
VANCOMYCIN SERPL-MCNC: 17.8 UG/ML (ref 5–20)
WBC # BLD AUTO: 8.7 X10*3/UL (ref 4.4–11.3)

## 2023-10-01 PROCEDURE — 2500000001 HC RX 250 WO HCPCS SELF ADMINISTERED DRUGS (ALT 637 FOR MEDICARE OP): Performed by: NURSE PRACTITIONER

## 2023-10-01 PROCEDURE — 9990 CHARGE CONVERSION

## 2023-10-01 PROCEDURE — 94640 AIRWAY INHALATION TREATMENT: CPT

## 2023-10-01 PROCEDURE — 87075 CULTR BACTERIA EXCEPT BLOOD: CPT

## 2023-10-01 PROCEDURE — 99232 SBSQ HOSP IP/OBS MODERATE 35: CPT | Performed by: INTERNAL MEDICINE

## 2023-10-01 PROCEDURE — 2500000001 HC RX 250 WO HCPCS SELF ADMINISTERED DRUGS (ALT 637 FOR MEDICARE OP): Performed by: INTERNAL MEDICINE

## 2023-10-01 PROCEDURE — 87205 SMEAR GRAM STAIN: CPT

## 2023-10-01 PROCEDURE — 85027 COMPLETE CBC AUTOMATED: CPT | Performed by: NURSE PRACTITIONER

## 2023-10-01 PROCEDURE — 36415 COLL VENOUS BLD VENIPUNCTURE: CPT | Performed by: INTERNAL MEDICINE

## 2023-10-01 PROCEDURE — 80053 COMPREHEN METABOLIC PANEL: CPT | Performed by: NURSE PRACTITIONER

## 2023-10-01 PROCEDURE — 1200000002 HC GENERAL ROOM WITH TELEMETRY DAILY

## 2023-10-01 PROCEDURE — 87070 CULTURE OTHR SPECIMN AEROBIC: CPT

## 2023-10-01 PROCEDURE — 80202 ASSAY OF VANCOMYCIN: CPT | Performed by: INTERNAL MEDICINE

## 2023-10-01 PROCEDURE — 2500000004 HC RX 250 GENERAL PHARMACY W/ HCPCS (ALT 636 FOR OP/ED): Performed by: INTERNAL MEDICINE

## 2023-10-01 PROCEDURE — 97161 PT EVAL LOW COMPLEX 20 MIN: CPT | Mod: GP

## 2023-10-01 PROCEDURE — 99233 SBSQ HOSP IP/OBS HIGH 50: CPT | Performed by: INTERNAL MEDICINE

## 2023-10-01 PROCEDURE — 36415 COLL VENOUS BLD VENIPUNCTURE: CPT | Performed by: NURSE PRACTITIONER

## 2023-10-01 RX ORDER — FLUTICASONE FUROATE AND VILANTEROL 200; 25 UG/1; UG/1
1 POWDER RESPIRATORY (INHALATION)
Status: DISCONTINUED | OUTPATIENT
Start: 2023-10-02 | End: 2023-10-05 | Stop reason: HOSPADM

## 2023-10-01 RX ORDER — NYSTATIN 100000 [USP'U]/G
POWDER TOPICAL 2 TIMES DAILY
Status: DISCONTINUED | OUTPATIENT
Start: 2023-10-01 | End: 2023-10-05 | Stop reason: HOSPADM

## 2023-10-01 RX ORDER — OXYCODONE HYDROCHLORIDE 5 MG/1
5 TABLET ORAL EVERY 6 HOURS PRN
Status: DISCONTINUED | OUTPATIENT
Start: 2023-10-01 | End: 2023-10-05 | Stop reason: HOSPADM

## 2023-10-01 RX ADMIN — OXYCODONE HYDROCHLORIDE 5 MG: 5 TABLET ORAL at 20:15

## 2023-10-01 RX ADMIN — FLUTICASONE FUROATE AND VILANTEROL TRIFENATATE 1 PUFF: 200; 25 POWDER RESPIRATORY (INHALATION) at 07:31

## 2023-10-01 RX ADMIN — VANCOMYCIN HYDROCHLORIDE 1250 MG: 1.25 INJECTION, POWDER, LYOPHILIZED, FOR SOLUTION INTRAVENOUS at 13:17

## 2023-10-01 RX ADMIN — PIPERACILLIN SODIUM AND TAZOBACTAM SODIUM 4.5 G: 4; .5 INJECTION, SOLUTION INTRAVENOUS at 23:34

## 2023-10-01 RX ADMIN — MORPHINE SULFATE 4 MG: 4 INJECTION, SOLUTION INTRAMUSCULAR; INTRAVENOUS at 04:39

## 2023-10-01 RX ADMIN — SODIUM CHLORIDE 100 ML/HR: 9 INJECTION, SOLUTION INTRAVENOUS at 04:40

## 2023-10-01 RX ADMIN — NYSTATIN: 100000 POWDER TOPICAL at 20:16

## 2023-10-01 RX ADMIN — OXYCODONE HYDROCHLORIDE 5 MG: 5 TABLET ORAL at 13:16

## 2023-10-01 RX ADMIN — HEPARIN SODIUM 7500 UNITS: 5000 INJECTION INTRAVENOUS; SUBCUTANEOUS at 04:38

## 2023-10-01 RX ADMIN — PIPERACILLIN SODIUM AND TAZOBACTAM SODIUM 4.5 G: 4; .5 INJECTION, SOLUTION INTRAVENOUS at 00:14

## 2023-10-01 RX ADMIN — MORPHINE SULFATE 4 MG: 4 INJECTION, SOLUTION INTRAMUSCULAR; INTRAVENOUS at 16:38

## 2023-10-01 RX ADMIN — VANCOMYCIN HYDROCHLORIDE 1250 MG: 1.25 INJECTION, POWDER, LYOPHILIZED, FOR SOLUTION INTRAVENOUS at 02:14

## 2023-10-01 RX ADMIN — HEPARIN SODIUM 7500 UNITS: 5000 INJECTION INTRAVENOUS; SUBCUTANEOUS at 13:17

## 2023-10-01 RX ADMIN — PIPERACILLIN SODIUM AND TAZOBACTAM SODIUM 4.5 G: 4; .5 INJECTION, SOLUTION INTRAVENOUS at 04:38

## 2023-10-01 RX ADMIN — PIPERACILLIN SODIUM AND TAZOBACTAM SODIUM 4.5 G: 4; .5 INJECTION, SOLUTION INTRAVENOUS at 11:50

## 2023-10-01 RX ADMIN — MORPHINE SULFATE 4 MG: 4 INJECTION, SOLUTION INTRAMUSCULAR; INTRAVENOUS at 08:51

## 2023-10-01 RX ADMIN — HEPARIN SODIUM 7500 UNITS: 5000 INJECTION INTRAVENOUS; SUBCUTANEOUS at 20:14

## 2023-10-01 RX ADMIN — METOPROLOL SUCCINATE 25 MG: 25 TABLET, EXTENDED RELEASE ORAL at 08:50

## 2023-10-01 RX ADMIN — PIPERACILLIN SODIUM AND TAZOBACTAM SODIUM 4.5 G: 4; .5 INJECTION, SOLUTION INTRAVENOUS at 16:30

## 2023-10-01 RX ADMIN — MORPHINE SULFATE 4 MG: 4 INJECTION, SOLUTION INTRAMUSCULAR; INTRAVENOUS at 00:14

## 2023-10-01 RX ADMIN — NYSTATIN: 100000 POWDER TOPICAL at 11:05

## 2023-10-01 ASSESSMENT — PAIN SCALES - GENERAL
PAINLEVEL_OUTOF10: 8
PAINLEVEL_OUTOF10: 7
PAINLEVEL_OUTOF10: 5 - MODERATE PAIN
PAINLEVEL_OUTOF10: 6
PAINLEVEL_OUTOF10: 6
PAINLEVEL_OUTOF10: 0 - NO PAIN
PAINLEVEL_OUTOF10: 7
PAINLEVEL_OUTOF10: 8

## 2023-10-01 ASSESSMENT — PAIN SCALES - PAIN ASSESSMENT IN ADVANCED DEMENTIA (PAINAD)
FACIALEXPRESSION: FACIAL GRIMACING
BREATHING: NORMAL
CONSOLABILITY: NO NEED TO CONSOLE
BODYLANGUAGE: RELAXED
TOTALSCORE: 2

## 2023-10-01 ASSESSMENT — PAIN - FUNCTIONAL ASSESSMENT: PAIN_FUNCTIONAL_ASSESSMENT: 0-10

## 2023-10-01 ASSESSMENT — COGNITIVE AND FUNCTIONAL STATUS - GENERAL
CLIMB 3 TO 5 STEPS WITH RAILING: A LITTLE
MOVING FROM LYING ON BACK TO SITTING ON SIDE OF FLAT BED WITH BEDRAILS: A LITTLE
WALKING IN HOSPITAL ROOM: A LITTLE
MOBILITY SCORE: 17
TURNING FROM BACK TO SIDE WHILE IN FLAT BAD: A LOT
MOVING TO AND FROM BED TO CHAIR: A LITTLE
STANDING UP FROM CHAIR USING ARMS: A LITTLE

## 2023-10-01 NOTE — PROGRESS NOTES
"Chris Chance is a 42 y.o. male on day 2 of admission presenting with No Principal Problem: There is no principal problem currently on the Problem List. Please update the Problem List and refresh..    Subjective   S/p debridement in OR. Patient seen and examined at bedside. Tachycardic overnight with borderline EKG. No new complaints. Discussed possibility of SNF.       Objective     Physical Exam  Constitutional: Well developed, awake/alert/oriented  x3, no distress, alert and cooperative   Eyes: clear sclera   ENMT: mucous membranes moist, no apparent injury,  no lesions seen   Head/Neck: Neck supple, no apparent injury   Respiratory/Thorax: Patent airways, CTAB, normal  breath sounds with good chest expansion, thorax symmetric   Cardiovascular: Regular rate and rhythm, no murmurs,  2+ equal pulses of the extremities, normal S 1and S 2   Gastrointestinal: distended, firm, non-tender, no  rebound tenderness or guarding, no masses palpable, no organomegaly, +BS   Musculoskeletal: ROM limited ble, no joint swelling,  normal strength   Extremities: normal extremities, no cyanosis, see  skin   Neurological: alert and oriented, intact motor response,  normal strength   Lymphatic: No significant lymphadenopathy   Psychological: Appropriate mood and behavior   Skin: Bilateral lower extremities erythematous, extremely  tender, warm, edematous, with missing epidermal layer with moist appearance     Last Recorded Vitals  Blood pressure 122/70, pulse (!) 120, temperature 36.4 °C (97.5 °F), resp. rate 20, height 1.701 m (5' 6.97\"), weight (!) 173 kg (380 lb 11.8 oz), SpO2 95 %.  Intake/Output last 3 Shifts:  I/O last 3 completed shifts:  In: 890 (5.2 mL/kg) [P.O.:590; IV Piggyback:300]  Out: - (0 mL/kg)   Weight: 172.7 kg     Relevant Results           This patient currently has cardiac telemetry ordered; if you would like to modify or discontinue the telemetry order, click here to go to the orders activity to " modify/discontinue the order.                 Assessment/Plan   Active Problems:    Tachycardia    TYRON WOOTEN is a 42 year old Male with a past medical history of hypertension, hyperlipidemia,  gout, and MICHAEL who presented today with bilateral lower extremity pain.      A/P:     1. BLE Cellulitis  Sepsis  Hyponatremia   Hypomagnesemia  Elevated alk phops     admit to tele  INPT  appreciate podiatry recommendations -- OR for debridement 9/29  Continue Vanco and Zosyn   Wound culture   CBC, CMP, magnesium in a.m.   Hold home blood pressure medications   Give 1 L IV fluid bolus then 100 cc/h   Repeat lactate pending   Morphine as needed        2.  Shortness of breath on exertion   Anemia      Transfuse 1 unit pRBCs 9/29  Guaiac stool   Add BNP   CBC in a.m.         3.  Tachycardia    Consult Cardiology, appreciate recs      4.  Chronic conditions: Hypertension, hyperlipidemia, gout, MICHAEL      Continue home medications as listed above holding home ACE/hydrochlorothiazide due to sepsis      5.  DVT prophylaxis      Heparin     Dispo: anticipate SNF for IV antibiotics and wound care       I spent 30 minutes in the professional and overall care of this patient.      Erik Avila, DO

## 2023-10-01 NOTE — PROGRESS NOTES
HPI:    Patient is a 42 year-old  male with PMHx of HTN and asthma, currently admitted for b/l lower extremity venous ulcers. POD #2 for excisional debridement of b/l lower leg venous ulcers. Seen and evaluated at bedside. Dressings intact b/l with outer ACE wraps wet. He states he has pain along the wounds since surgery, although this pain is different from his previous pain. He finds pain medications are improving his pain level. Denies acute pedal complaints. Denies F/C/N/V/SOB. Reports back pain. He has been overall eating regular meals and drinking water, although skipped lunch today due to decreased appetite. States he lives at home with his brother, but plans are for discharge to a nursing facility possibly this Tuesday, 10/3.       ROS: Negative except as per HPI.         Physical Exam:    Vitals:    10/01/23 0817   BP: 131/67   Pulse: (!) 112   Resp: 18   Temp: 36.1 °C (97 °F)   SpO2: (!) 87%     General: No acute distress. Cooperative. Post-op dressings intact b/l.    Psych: Normal mood and affect.      Vascular: DP and PT pulses palpable b/l. Severe non-pitting edema b/l, L > R, improved from admission. Hair growth present b/l.      Derm: Toenails 1-5 b/l thickened, elongated, yellow. Webspaces 1-4 b/l clean, dry, intact.       Left:   Large circumferential venous ulcer spanning entire lower leg to level of subcutaneous tissue. Mixed granular and fibrotic base. No overlying slough. Serous drainage. No purulence. Mild malodor. Mild bleeding along posterior wound with dressing removal. Severe pain with dressing removal and light touch. No crepitation or fluctuance. No lymphatic streaking. Left lower leg edema has overall improved.      Pressure ulcer with skin breakdown along left plantar-lateral heel. Mild bleeding with dressing change. Pain with light touch. No other drainage.      Right:  Large venous ulcer along anterior and medial leg. Mixed granular and fibrotic base. Ulcer overall appears  improved, likely due to decreased edema from compressive wraps. No overlying slough. Serous drainage. No purulence. No malodor. Mild bleeding with dressing removal. Severe pain with dressing removal and light touch. No crepitation or fluctuance. No lymphatic streaking. Right lower leg edema has overall improved.     Pressure ulcer overlying right medial subtalar joint area. Wound base is 80% granular, 10% fibrotic.   Proximal to this overlying the right medial calcaneus, there is a smaller area of skin breakdown.   The two ulcerated areas along the right foot are surrounding with mildly macerated skin, which was not present yesterday.     Neuro: Sensation to light touch intact b/l. Severe pain with light touch and dressing change.      MSK: Active ROM of ankles b/l and digits b/l intact. Able to lift b/l LE during dressing change, but limited due to severe pain of ulcers.       Resp: Unlabored breathing.          Assessment:   - S/P excisional debridement, bilateral lower extremities  - Chronic venous insufficiency  - Chronic non-pressure ulcers to level of subcutaneous tissue, bilateral lower extremities  - Pressure ulcer with skin break down, left heel  - Chronic non-pressure ulcer to level of subcutaneous tissue, right foot     Patient is a 42 year-old  male with PMHx of HTN and asthma. He is currently admitted for venous ulcerations along b/l LE. No evaluation of these ulcers prior to current admission. POD #1 for excisional debridement of b/l LE ulcers. Post-op dressings were intact with outer-most layers wet due to drainage from ulcers. While overall b/l edema is improving, patient appears to have increased damp and macerated skin, evidently because his dressings are staying soaked for 24 hours at a time.  Interval charts, labs, imaging reviewed:     WBC 8.7.   Lactate 1.5 (downtrending).  CRP 9.71.   .  Albumin 2.6   HgA1c 5.3%  Left leg post-op wound cx: negative gram stain; no growth on  culture to date.   Right leg post-op wound cx: cannot access currently.  Blood cx negative to date, in progress.  PVR b/l: No evidence of arterial occlusive disease.   Venous US: Negative for acute DVT. Could not rule out thrombus in peroneal and PT veins due to open wounds.   SANTIAGO: EF 60-65%. Increased RV systolic pressure. No cardiac tamponade. Pulmonary artery not visualized.   Abx: IV vancomycin, IV Zosyn  Pain: Tylenol 650mg, oxycodone 5 mg q6 hours  DVT ppx: heparin 7500 units       Plan:  - Patient seen and evaluated at bedside. All findings discussed. All questions answered.   - Left wound cx: negative to date, in progress  - Right wound cx: in progress  - Start offloading left heel to prevent further skin breakdown of the area.  - While edema b/l is overall improving, soaked dressings are causing damp and macerated skin. Consider multiple daily dressing changes, starting diuretic, increased elevation.   - Dressings b/l: adaptic around entire leg, calcium alginate, abd x 4, Kerlix, large ACE x 2.  - Continue daily dressing changes.   - For nursing: Please reinforce or change as needed.   - Plan for discharge to nursing facility this week. Strongly advised to continue regular wound care and follow-ups after discharge.  - Podiatry will continue following.        This patient was discussed with the attending, Dr. Libra Smalls DPM. Please await attending's signature for finalization of this note.      Pat Garg DPM/PGY1  Podiatric Medicine and Surgery

## 2023-10-01 NOTE — CARE PLAN
Problem: PT Problem  Goal: STG - Pt will amb 90' using LRD to no device INDEP  Outcome: Progressing  Goal: STG - Pt will transition supine <> sitting INDEP  Outcome: Progressing  Goal: STG - Pt will transfer STS INDEP  Outcome: Progressing  Goal: STG -  Pt will navigate 4 stairs using rail with SBA  Outcome: Progressing

## 2023-10-01 NOTE — PROGRESS NOTES
Subjective Data:  Denies any chest pain or shortness of breath or palpitations  Still with leg discomfort    Overnight Events:    Leg pain     Objective Data:  Telemetry with mild sinus tachycardia  Last Recorded Vitals:  Vitals:    09/30/23 1957 10/01/23 0023 10/01/23 0440 10/01/23 0817   BP: 122/70 116/58 126/65 131/67   BP Location:       Patient Position:       Pulse:  (!) 118 (!) 120 (!) 112   Resp:    18   Temp: 36.4 °C (97.5 °F) 36.3 °C (97.3 °F) 36.6 °C (97.9 °F) 36.1 °C (97 °F)   TempSrc:       SpO2: 95% 93% 95% (!) 87%   Weight:       Height:           Last Labs:  CBC - 10/1/2023:  6:20 AM  8.7 7.6 249    24.6      CMP - 10/1/2023:  6:20 AM  8.0 5.2 30 --- 0.6   _ 2.6 12 98      PTT - No results in last year.  _   _ _     TROPHS   Date/Time Value Ref Range Status   09/27/2023 11:14 AM 4 0 - 20 ng/L Final     Comment:     .  Less than 99th percentile of normal range cutoff-  Female and children under 18 years old <14 ng/L; Male <21 ng/L: Negative  Repeat testing should be performed if clinically indicated.   .  Female and children under 18 years old 14-50 ng/L; Male 21-50 ng/L:  Consistent with possible cardiac damage and possible increased clinical   risk. Serial measurements may help to assess extent of myocardial damage.   .  >50 ng/L: Consistent with cardiac damage, increased clinical risk and  myocardial infarction. Serial measurements may help assess extent of   myocardial damage.   .   NOTE: Children less than 1 year old may have higher baseline troponin   levels and results should be interpreted in conjunction with the overall   clinical context.   .  NOTE: Troponin I testing is performed using a different   testing methodology at [facility] than at other   [facility] hospitals. Direct result comparisons should only   be made within the same method.       BNP   Date/Time Value Ref Range Status   09/27/2023 11:12 AM 7 0 - 99 pg/mL Final     Comment:     .  <100 pg/mL - Heart failure  unlikely  100-299 pg/mL - Intermediate probability of acute heart  .               failure exacerbation. Correlate with clinical  .               context and patient history.    >=300 pg/mL - Heart Failure likely. Correlate with clinical  .               context and patient history.  BNP testing is performed using different testing   methodology at [facility] than at other   [facility] hospitals. Direct result comparisons should   only be made within the same method.       HGBA1C   Date/Time Value Ref Range Status   09/29/2023 08:38 AM 5.3 see below % Final     VLDL   Date/Time Value Ref Range Status   04/27/2022 09:06 AM 28 0 - 40 mg/dL Final   04/23/2021 11:49 AM 20 0 - 40 mg/dL Final      Last I/O:  I/O last 3 completed shifts:  In: 890 (5.2 mL/kg) [P.O.:590; IV Piggyback:300]  Out: - (0 mL/kg)   Weight: 172.7 kg     Past Cardiology Tests (Last 3 Years):  EKG:  No results found for this or any previous visit from the past 1095 days.    Echo:  Transthoracic Echo (TTE) Complete 9/30/2023    Ejection Fractions:  55 to 60%  Cath:  No results found for this or any previous visit from the past 1095 days.    Stress Test:  No results found for this or any previous visit from the past 1095 days.    Cardiac Imaging:  No results found for this or any previous visit from the past 1095 days.      Inpatient Medications:  Scheduled medications   Medication Dose Route Frequency    [START ON 10/2/2023] fluticasone furoate-vilanteroL  1 puff inhalation Daily    heparin (porcine)  7,500 Units subcutaneous q8h    metoprolol succinate XL  25 mg oral Daily    nystatin   Topical BID    piperacillin-tazobactam  4.5 g intravenous q6h    vancomycin  1,250 mg intravenous q12h     PRN medications   Medication    acetaminophen    albuterol    morphine    oxyCODONE     Continuous Medications   Medication Dose Last Rate    sodium chloride 0.9%  100 mL/hr 100 mL/hr (10/01/23 0440)       Physical Exam:  Constitutional:       General:  He is not in acute distress.     Appearance: He is obese.   Neck:      Vascular: No carotid bruit, hepatojugular reflux or JVD.   Cardiovascular:      Rate and Rhythm:  Regular rhythm with increased rate     Pulses:  decreased pulses.      Heart sounds: Normal heart sounds.  legs are wrapped and edematous        Pulmonary:      Effort: Pulmonary effort is normal.   Abdominal:      General: Bowel sounds are normal.   Musculoskeletal:      Comments: Normal strength   Skin:     General: Skin is warm and dry.   Neurological:      General: No focal deficit present.      Mental Status: He is alert.   Psychiatric:         Attention and Perception: Attention normal.      Assessment/Plan   1.  Sepsis   2.  Cellulitis   3.  Anemia   4.  Sinus tachycardia  - 42-year-old male with history of hypertension presented to hospital with edematous painful legs was diagnosed with cellulitis/sepsis.  Patient with persistent sinus tachycardia- this is likely the result of infection and anemia- echo with normal LV systolic function.  Continue treatment for his infection and anemia.  We will continue to monitor with you.  .  Peripheral IV 20 G Right Antecubital (Active)   Site Assessment Clean;Dry;Intact 10/01/23 1115   Dressing Status Clean;Dry 10/01/23 1115   Number of days:        Peripheral IV 09/29/23 20 G Left;Posterior Hand (Active)   Site Assessment Clean;Dry;Intact 10/01/23 1116   Dressing Status Clean;Dry 10/01/23 1116   Number of days: 2       Code Status:  No Order            Rudolph Persaud DO

## 2023-10-01 NOTE — PROGRESS NOTES
"Vancomycin Dosing by Pharmacy- FOLLOW UP    Chris Chance is a 42 y.o. year old male who Pharmacy has been consulted for vancomycin dosing for cellulitis, skin and soft tissue. Based on the patient's indication and renal status this patient is being dosed based on a goal AUC of 400-600.     Renal function is currently stable.    Current vancomycin dose: 1250 mg given every 12 hours    Estimated vancomycin AUC on current dose: 518 mg/L.hr     Visit Vitals  /67   Pulse (!) 112   Temp 36.1 °C (97 °F)   Resp 18        Lab Results   Component Value Date    CREATININE 1.17 10/01/2023    CREATININE 0.98 09/30/2023    CREATININE 1.16 09/29/2023    CREATININE 1.63 (H) 09/28/2023    CREATININE 1.14 09/27/2023    CREATININE 0.74 01/10/2023        Patient weight is No results found for: \"PTWEIGHT\"    No results found for: \"CULTURE\"     I/O last 3 completed shifts:  In: 890 (5.2 mL/kg) [P.O.:590; IV Piggyback:300]  Out: - (0 mL/kg)   Weight: 172.7 kg   [unfilled]    No results found for: \"PATIENTTEMP\"     Assessment/Plan    Within goal AUC range. Continue current vancomycin regimen.    This dosing regimen is predicted by [employer] to result in the following pharmacokinetic parameters:  Probability of AUC24 > 400: 100 %  Ctrough,ss: 16.4 mg/L  Probability of Ctrough,ss > 20: 2 %  Probability of nephrotoxicity (Lodise BLAKE 2009): 12 %    The next level will be obtained on 10/6 with AM labs. May be obtained sooner if clinically indicated.   Will continue to monitor renal function daily while on vancomycin and order serum creatinine at least every 48 hours if not already ordered.  Follow for continued vancomycin needs, clinical response, and signs/symptoms of toxicity.       Kim Cote, PharmD           "

## 2023-10-01 NOTE — PROGRESS NOTES
Physical Therapy    Physical Therapy Evaluation    Patient Name: Chris Chance  MRN: [id number]  Today's Date: 10/1/2023   Time Calculation  Start Time: 1210  Stop Time: 1233  Time Calculation (min): 23 min    Assessment/Plan   PT Assessment  PT Assessment Results: Decreased endurance, Decreased mobility  Rehab Prognosis: Good  Evaluation/Treatment Tolerance: Patient tolerated treatment well, Patient limited by pain  End of Session Communication: Bedside nurse  End of Session Patient Position: Bed, 2 rail up, Alarm off, not on at start of session  IP OR SWING BED PT PLAN  Inpatient or Swing Bed: Inpatient  PT Plan  Treatment/Interventions: Bed mobility, Transfer training, Gait training, Stair training  PT Plan: Skilled PT  PT Frequency: 3 times per week  PT Discharge Recommendations: Low intensity level of continued care      Subjective   General Visit Information:  General  Reason for Referral: PT eval and treat; BLE pain, edema, wounds, BLE cellulitis, s/p excisional debridement 9/29  Referred By: Erik Avila  Past Medical History Relevant to Rehab: HTN, HLD, asthma, gout, MICHAEL, obesity  Family/Caregiver Present: No  Prior to Session Communication: Bedside nurse  Patient Position Received: Bed, 2 rail up, Alarm off, not on at start of session  General Comment: Pt supine in bed upon entering, agreeable to PT. Pt with SpO2 down to 86-88% with mobility, RN made aware  Home Living:  Home Living  Home Living Comments: Pt lives in a house with brother with 3 ANGIE with rail, 1st floor bed and bath, tub shower, laundry in basement with rail  Prior Level of Function:  Prior Function Per Pt/Caregiver Report  Prior Function Comments: INDEP with amb, INDEP with ADLs, INDEP with IADLs, (+) driving, (+) working as a   Precautions:  Precautions  Medical Precautions: Fall precautions  Vital Signs:  Vital Signs  Heart Rate:  (HR 110s-120s during session; Spo2 down to 86% with mobility, end of session SpO2 up  to 92%)    Objective   Pain:  Pain Assessment  Pain Assessment:  (6/10 pain BLE)  Cognition:  Cognition  Overall Cognitive Status: Within Functional Limits    General Assessments:    Strength  Strength Comments: BLE at least 3/5       Functional Assessments:  Bed Mobility  Bed Mobility:  (sup<>sit SBA with HOB raised and increased time)    Transfers  Transfer:  (sit<>stand with SBA with no device)    Ambulation/Gait Training  Ambulation/Gait Training Performed:  (Pt ambulates in hallway x 50ft with IV pole and SBA. Pt demonstrates lateral sway with increased pain, but no acute LOB noted. Pt reports he has been getting up to use bathroom without assist during hospital stay)    Outcome Measures:  [facility] Basic Mobility  Turning from your back to your side while in a flat bed without using bedrails: A little  Moving from lying on your back to sitting on the side of a flat bed without using bedrails: A lot  Moving to and from bed to chair (including a wheelchair): A little  Standing up from a chair using your arms (e.g. wheelchair or bedside chair): A little  To walk in hospital room: A little  Climbing 3-5 steps with railing: A little  Basic Mobility - Total Score: 17    Encounter Problems       Encounter Problems (Active)       PT Problem       STG - Pt will amb 90' using LRD to no device INDEP (Progressing)       Start:  10/01/23    Expected End:  10/15/23            STG - Pt will transition supine <> sitting INDEP (Progressing)       Start:  10/01/23    Expected End:  10/15/23            STG - Pt will transfer STS INDEP (Progressing)       Start:  10/01/23    Expected End:  10/15/23            STG -  Pt will navigate 4 stairs using rail with SBA (Progressing)       Start:  10/01/23    Expected End:  10/15/23                   Education Documentation  Mobility Training, taught by Emily Rosales PT at 10/1/2023  1:28 PM.  Learner: Patient  Readiness: Acceptance  Method: Explanation  Response: Verbalizes  Understanding    Education Comments  No comments found.

## 2023-10-02 ENCOUNTER — APPOINTMENT (OUTPATIENT)
Dept: RADIOLOGY | Facility: HOSPITAL | Age: 43
DRG: 871 | End: 2023-10-02
Payer: COMMERCIAL

## 2023-10-02 LAB
ALBUMIN SERPL BCP-MCNC: 2.7 G/DL (ref 3.4–5)
ALP SERPL-CCNC: 90 U/L (ref 33–120)
ALT SERPL W P-5'-P-CCNC: 11 U/L (ref 10–52)
ANION GAP SERPL CALC-SCNC: 14 MMOL/L (ref 10–20)
AST SERPL W P-5'-P-CCNC: 24 U/L (ref 9–39)
BILIRUB SERPL-MCNC: 0.5 MG/DL (ref 0–1.2)
BUN SERPL-MCNC: 6 MG/DL (ref 6–23)
CALCIUM SERPL-MCNC: 7.8 MG/DL (ref 8.6–10.3)
CHLORIDE SERPL-SCNC: 104 MMOL/L (ref 98–107)
CO2 SERPL-SCNC: 24 MMOL/L (ref 21–32)
CREAT SERPL-MCNC: 1.07 MG/DL (ref 0.5–1.3)
ERYTHROCYTE [DISTWIDTH] IN BLOOD BY AUTOMATED COUNT: 15.3 % (ref 11.5–14.5)
GFR SERPL CREATININE-BSD FRML MDRD: 89 ML/MIN/1.73M*2
GLUCOSE SERPL-MCNC: 89 MG/DL (ref 74–99)
GRAM STAIN: ABNORMAL
HCT VFR BLD AUTO: 26.1 % (ref 41–52)
HGB BLD-MCNC: 7.9 G/DL (ref 13.5–17.5)
MCH RBC QN AUTO: 33.1 PG (ref 26–34)
MCHC RBC AUTO-ENTMCNC: 30.3 G/DL (ref 32–36)
MCV RBC AUTO: 109 FL (ref 80–100)
NRBC BLD-RTO: 0 /100 WBCS (ref 0–0)
OCCULT BLOOD, STOOL X1: NORMAL
PLATELET # BLD AUTO: 244 X10*3/UL (ref 150–450)
PMV BLD AUTO: 8.1 FL (ref 7.5–11.5)
POTASSIUM SERPL-SCNC: 4.2 MMOL/L (ref 3.5–5.3)
PROT SERPL-MCNC: 5.2 G/DL (ref 6.4–8.2)
RBC # BLD AUTO: 2.39 X10*6/UL (ref 4.5–5.9)
SODIUM SERPL-SCNC: 138 MMOL/L (ref 136–145)
TISSUE/WOUND CULTURE/SMEAR: ABNORMAL
WBC # BLD AUTO: 9.7 X10*3/UL (ref 4.4–11.3)

## 2023-10-02 PROCEDURE — 99232 SBSQ HOSP IP/OBS MODERATE 35: CPT | Performed by: INTERNAL MEDICINE

## 2023-10-02 PROCEDURE — 99233 SBSQ HOSP IP/OBS HIGH 50: CPT | Performed by: INTERNAL MEDICINE

## 2023-10-02 PROCEDURE — 94640 AIRWAY INHALATION TREATMENT: CPT

## 2023-10-02 PROCEDURE — 71275 CT ANGIOGRAPHY CHEST: CPT | Performed by: RADIOLOGY

## 2023-10-02 PROCEDURE — 97165 OT EVAL LOW COMPLEX 30 MIN: CPT | Mod: GO

## 2023-10-02 PROCEDURE — 71275 CT ANGIOGRAPHY CHEST: CPT

## 2023-10-02 PROCEDURE — 9990 CHARGE CONVERSION

## 2023-10-02 PROCEDURE — 2500000001 HC RX 250 WO HCPCS SELF ADMINISTERED DRUGS (ALT 637 FOR MEDICARE OP): Performed by: INTERNAL MEDICINE

## 2023-10-02 PROCEDURE — 36415 COLL VENOUS BLD VENIPUNCTURE: CPT | Performed by: NURSE PRACTITIONER

## 2023-10-02 PROCEDURE — 87186 SC STD MICRODIL/AGAR DIL: CPT

## 2023-10-02 PROCEDURE — 2550000001 HC RX 255 CONTRASTS: Performed by: INTERNAL MEDICINE

## 2023-10-02 PROCEDURE — 2500000004 HC RX 250 GENERAL PHARMACY W/ HCPCS (ALT 636 FOR OP/ED): Performed by: INTERNAL MEDICINE

## 2023-10-02 PROCEDURE — 2500000001 HC RX 250 WO HCPCS SELF ADMINISTERED DRUGS (ALT 637 FOR MEDICARE OP): Performed by: NURSE PRACTITIONER

## 2023-10-02 PROCEDURE — 80053 COMPREHEN METABOLIC PANEL: CPT | Performed by: NURSE PRACTITIONER

## 2023-10-02 PROCEDURE — 87077 CULTURE AEROBIC IDENTIFY: CPT

## 2023-10-02 PROCEDURE — 1200000002 HC GENERAL ROOM WITH TELEMETRY DAILY

## 2023-10-02 PROCEDURE — 85027 COMPLETE CBC AUTOMATED: CPT | Performed by: NURSE PRACTITIONER

## 2023-10-02 RX ORDER — ALBUTEROL SULFATE 90 UG/1
2 AEROSOL, METERED RESPIRATORY (INHALATION) EVERY 6 HOURS PRN
Qty: 18 G | Refills: 11 | Status: SHIPPED | OUTPATIENT
Start: 2023-10-02

## 2023-10-02 RX ORDER — FLUTICASONE FUROATE AND VILANTEROL 200; 25 UG/1; UG/1
1 POWDER RESPIRATORY (INHALATION)
Start: 2023-10-03 | End: 2023-10-18 | Stop reason: ALTCHOICE

## 2023-10-02 RX ORDER — ACETAMINOPHEN 325 MG/1
650 TABLET ORAL EVERY 4 HOURS PRN
Qty: 30 TABLET | Refills: 0 | Status: SHIPPED | OUTPATIENT
Start: 2023-10-02 | End: 2023-11-09 | Stop reason: SDUPTHER

## 2023-10-02 RX ORDER — NYSTATIN 100000 [USP'U]/G
POWDER TOPICAL 2 TIMES DAILY
Start: 2023-10-02 | End: 2023-11-09 | Stop reason: SDUPTHER

## 2023-10-02 RX ORDER — OXYCODONE HYDROCHLORIDE 5 MG/1
5 TABLET ORAL EVERY 4 HOURS PRN
Status: DISCONTINUED | OUTPATIENT
Start: 2023-10-02 | End: 2023-10-05 | Stop reason: HOSPADM

## 2023-10-02 RX ORDER — METOPROLOL SUCCINATE 25 MG/1
25 TABLET, EXTENDED RELEASE ORAL DAILY
Start: 2023-10-03 | End: 2023-11-09 | Stop reason: SDUPTHER

## 2023-10-02 RX ORDER — TRAMADOL HYDROCHLORIDE 50 MG/1
50 TABLET ORAL 3 TIMES DAILY PRN
Status: DISCONTINUED | OUTPATIENT
Start: 2023-10-02 | End: 2023-10-05 | Stop reason: HOSPADM

## 2023-10-02 RX ORDER — OXYCODONE HYDROCHLORIDE 5 MG/1
5 TABLET ORAL EVERY 4 HOURS PRN
Qty: 15 TABLET | Refills: 0 | Status: SHIPPED | OUTPATIENT
Start: 2023-10-02 | End: 2023-11-30 | Stop reason: ALTCHOICE

## 2023-10-02 RX ADMIN — PIPERACILLIN SODIUM AND TAZOBACTAM SODIUM 4.5 G: 4; .5 INJECTION, SOLUTION INTRAVENOUS at 04:07

## 2023-10-02 RX ADMIN — OXYCODONE HYDROCHLORIDE 5 MG: 5 TABLET ORAL at 23:37

## 2023-10-02 RX ADMIN — VANCOMYCIN HYDROCHLORIDE 1250 MG: 1.25 INJECTION, POWDER, LYOPHILIZED, FOR SOLUTION INTRAVENOUS at 02:02

## 2023-10-02 RX ADMIN — NYSTATIN: 100000 POWDER TOPICAL at 09:29

## 2023-10-02 RX ADMIN — SODIUM CHLORIDE 100 ML/HR: 9 INJECTION, SOLUTION INTRAVENOUS at 09:36

## 2023-10-02 RX ADMIN — HEPARIN SODIUM 7500 UNITS: 5000 INJECTION INTRAVENOUS; SUBCUTANEOUS at 04:06

## 2023-10-02 RX ADMIN — PIPERACILLIN SODIUM AND TAZOBACTAM SODIUM 4.5 G: 4; .5 INJECTION, SOLUTION INTRAVENOUS at 11:47

## 2023-10-02 RX ADMIN — HEPARIN SODIUM 7500 UNITS: 5000 INJECTION INTRAVENOUS; SUBCUTANEOUS at 13:00

## 2023-10-02 RX ADMIN — HEPARIN SODIUM 7500 UNITS: 5000 INJECTION INTRAVENOUS; SUBCUTANEOUS at 21:00

## 2023-10-02 RX ADMIN — IOHEXOL 75 ML: 350 INJECTION, SOLUTION INTRAVENOUS at 19:15

## 2023-10-02 RX ADMIN — METOPROLOL SUCCINATE 25 MG: 25 TABLET, EXTENDED RELEASE ORAL at 09:29

## 2023-10-02 RX ADMIN — OXYCODONE HYDROCHLORIDE 5 MG: 5 TABLET ORAL at 19:31

## 2023-10-02 RX ADMIN — PIPERACILLIN SODIUM AND TAZOBACTAM SODIUM 4.5 G: 4; .5 INJECTION, SOLUTION INTRAVENOUS at 22:52

## 2023-10-02 RX ADMIN — NYSTATIN: 100000 POWDER TOPICAL at 21:00

## 2023-10-02 RX ADMIN — FLUTICASONE FUROATE AND VILANTEROL 1 PUFF: 200; 25 POWDER RESPIRATORY (INHALATION) at 06:53

## 2023-10-02 RX ADMIN — OXYCODONE HYDROCHLORIDE 5 MG: 5 TABLET ORAL at 14:21

## 2023-10-02 RX ADMIN — PIPERACILLIN SODIUM AND TAZOBACTAM SODIUM 4.5 G: 4; .5 INJECTION, SOLUTION INTRAVENOUS at 16:38

## 2023-10-02 RX ADMIN — MORPHINE SULFATE 4 MG: 4 INJECTION, SOLUTION INTRAMUSCULAR; INTRAVENOUS at 00:03

## 2023-10-02 RX ADMIN — OXYCODONE HYDROCHLORIDE 5 MG: 5 TABLET ORAL at 04:07

## 2023-10-02 RX ADMIN — MORPHINE SULFATE 4 MG: 4 INJECTION, SOLUTION INTRAMUSCULAR; INTRAVENOUS at 09:28

## 2023-10-02 ASSESSMENT — COGNITIVE AND FUNCTIONAL STATUS - GENERAL
TOILETING: A LITTLE
DRESSING REGULAR LOWER BODY CLOTHING: A LITTLE
DRESSING REGULAR LOWER BODY CLOTHING: A LOT
HELP NEEDED FOR BATHING: A LOT
MOBILITY SCORE: 24
MOBILITY SCORE: 20
DRESSING REGULAR UPPER BODY CLOTHING: A LITTLE
DAILY ACTIVITIY SCORE: 18
DAILY ACTIVITIY SCORE: 20
DRESSING REGULAR UPPER BODY CLOTHING: A LITTLE
HELP NEEDED FOR BATHING: A LITTLE
MOVING TO AND FROM BED TO CHAIR: A LITTLE
DAILY ACTIVITIY SCORE: 24
CLIMB 3 TO 5 STEPS WITH RAILING: A LITTLE
WALKING IN HOSPITAL ROOM: A LITTLE
STANDING UP FROM CHAIR USING ARMS: A LITTLE
TOILETING: A LITTLE

## 2023-10-02 ASSESSMENT — PAIN SCALES - PAIN ASSESSMENT IN ADVANCED DEMENTIA (PAINAD)
BREATHING: NORMAL
TOTALSCORE: 2
BREATHING: NORMAL
CONSOLABILITY: NO NEED TO CONSOLE
BODYLANGUAGE: RELAXED
BODYLANGUAGE: RELAXED
CONSOLABILITY: NO NEED TO CONSOLE
FACIALEXPRESSION: FACIAL GRIMACING
TOTALSCORE: 0
FACIALEXPRESSION: SMILING OR INEXPRESSIVE

## 2023-10-02 ASSESSMENT — PAIN SCALES - WONG BAKER
WONGBAKER_NUMERICALRESPONSE: HURTS WHOLE LOT
WONGBAKER_NUMERICALRESPONSE: NO HURT
WONGBAKER_NUMERICALRESPONSE: NO HURT
WONGBAKER_NUMERICALRESPONSE: HURTS WHOLE LOT
WONGBAKER_NUMERICALRESPONSE: NO HURT

## 2023-10-02 ASSESSMENT — PAIN SCALES - GENERAL
PAINLEVEL_OUTOF10: 0 - NO PAIN
PAINLEVEL_OUTOF10: 6
PAINLEVEL_OUTOF10: 7
PAINLEVEL_OUTOF10: 8
PAINLEVEL_OUTOF10: 6
PAINLEVEL_OUTOF10: 0 - NO PAIN
PAINLEVEL_OUTOF10: 0 - NO PAIN
PAINLEVEL_OUTOF10: 8
PAINLEVEL_OUTOF10: 6
PAINLEVEL_OUTOF10: 7
PAINLEVEL_OUTOF10: 0 - NO PAIN

## 2023-10-02 ASSESSMENT — PAIN - FUNCTIONAL ASSESSMENT
PAIN_FUNCTIONAL_ASSESSMENT: 0-10

## 2023-10-02 ASSESSMENT — ACTIVITIES OF DAILY LIVING (ADL): BATHING_ASSISTANCE: STAND BY

## 2023-10-02 NOTE — PROGRESS NOTES
Subjective Data:  Denies any chest pain or palpitations-does have some dyspnea on exertion going up to the bathroom  Still with leg discomfort    Overnight Events:    Leg pain     Objective Data:  Telemetry with mild sinus tachycardia  Last Recorded Vitals:  Vitals:    10/02/23 0604 10/02/23 0653 10/02/23 0948 10/02/23 0957   BP: 114/77  131/76    BP Location: Left arm  Left arm    Patient Position: Sitting  Lying    Pulse: (!) 111  (!) 116    Resp: 22  20    Temp: 36 °C (96.8 °F)  35.6 °C (96.1 °F)    TempSrc: Tympanic  Temporal    SpO2: 93% 91% 91% 94%   Weight:       Height:           Last Labs:  CBC - 10/2/2023:  6:40 AM  9.7 7.9 244    26.1      CMP - 10/2/2023:  6:40 AM  7.8 5.2 24 --- 0.5   _ 2.7 11 90      PTT - No results in last year.  _   _ _     TROPHS   Date/Time Value Ref Range Status   09/27/2023 11:14 AM 4 0 - 20 ng/L Final     Comment:     .  Less than 99th percentile of normal range cutoff-  Female and children under 18 years old <14 ng/L; Male <21 ng/L: Negative  Repeat testing should be performed if clinically indicated.   .  Female and children under 18 years old 14-50 ng/L; Male 21-50 ng/L:  Consistent with possible cardiac damage and possible increased clinical   risk. Serial measurements may help to assess extent of myocardial damage.   .  >50 ng/L: Consistent with cardiac damage, increased clinical risk and  myocardial infarction. Serial measurements may help assess extent of   myocardial damage.   .   NOTE: Children less than 1 year old may have higher baseline troponin   levels and results should be interpreted in conjunction with the overall   clinical context.   .  NOTE: Troponin I testing is performed using a different   testing methodology at [facility] than at other   [facility] hospitals. Direct result comparisons should only   be made within the same method.       BNP   Date/Time Value Ref Range Status   09/27/2023 11:12 AM 7 0 - 99 pg/mL Final     Comment:     .  <100  pg/mL - Heart failure unlikely  100-299 pg/mL - Intermediate probability of acute heart  .               failure exacerbation. Correlate with clinical  .               context and patient history.    >=300 pg/mL - Heart Failure likely. Correlate with clinical  .               context and patient history.  BNP testing is performed using different testing   methodology at [facility] than at other   [facility] hospitals. Direct result comparisons should   only be made within the same method.       HGBA1C   Date/Time Value Ref Range Status   09/29/2023 08:38 AM 5.3 see below % Final     VLDL   Date/Time Value Ref Range Status   04/27/2022 09:06 AM 28 0 - 40 mg/dL Final   04/23/2021 11:49 AM 20 0 - 40 mg/dL Final      Last I/O:  I/O last 3 completed shifts:  In: 2600 (15.1 mL/kg) [I.V.:2000 (11.6 mL/kg); IV Piggyback:600]  Out: - (0 mL/kg)   Weight: 172.7 kg     Past Cardiology Tests (Last 3 Years):  EKG:  No results found for this or any previous visit from the past 1095 days.    Echo:  Transthoracic Echo (TTE) Complete 9/30/2023    Ejection Fractions:  55 to 60%  Cath:  No results found for this or any previous visit from the past 1095 days.    Stress Test:  No results found for this or any previous visit from the past 1095 days.    Cardiac Imaging:  No results found for this or any previous visit from the past 1095 days.      Inpatient Medications:  Scheduled medications   Medication Dose Route Frequency    fluticasone furoate-vilanteroL  1 puff inhalation Daily    heparin (porcine)  7,500 Units subcutaneous q8h    metoprolol succinate XL  25 mg oral Daily    nystatin   Topical BID    piperacillin-tazobactam  4.5 g intravenous q6h     PRN medications   Medication    acetaminophen    albuterol    morphine    oxyCODONE     Continuous Medications   Medication Dose Last Rate    sodium chloride 0.9%  100 mL/hr 100 mL/hr (10/02/23 1100)       Physical Exam:  Constitutional:       General: He is not in acute  distress.     Appearance: He is obese.   Neck:      Vascular: No carotid bruit, hepatojugular reflux or JVD.   Cardiovascular:      Rate and Rhythm:  Regular rhythm with increased rate     Pulses:  decreased pulses.      Heart sounds: Normal heart sounds.  legs are wrapped and edematous        Pulmonary:      Effort: Pulmonary effort is normal.   Abdominal:      General: Bowel sounds are normal.   Musculoskeletal:      Comments: Normal strength   Skin:     General: Skin is warm and dry.   Neurological:      General: No focal deficit present.      Mental Status: He is alert.   Psychiatric:         Attention and Perception: Attention normal.      Assessment/Plan   1.  Sepsis   2.  Cellulitis   3.  Anemia   4.  Sinus tachycardia  - 42-year-old male with history of hypertension presented to hospital with edematous painful legs was diagnosed with cellulitis/sepsis.  Patient with persistent sinus tachycardia- this is likely the result of infection and anemia- echo with normal LV systolic function.  Continue treatment for his infection and anemia.  -In light of persistent tachycardia and shortness of breath we will check a CT angio to assess for PE    Site Assessment Clean;Dry;Intact 10/01/23 1115   Dressing Status Clean;Dry 10/01/23 1115   Number of days:        Peripheral IV 09/29/23 20 G Left;Posterior Hand (Active)   Site Assessment Clean;Dry;Intact 10/01/23 1116   Dressing Status Clean;Dry 10/01/23 1116   Number of days: 2       Code Status:  No Order            Rudolph Persaud DO

## 2023-10-02 NOTE — CARE PLAN
Problem: Balance  Goal: Pt will complete lb dressing with CGA assist WITH AE  Outcome: Progressing  Goal: Pt will demonstrate/simulate/verbalize understating of energy conservation and work simplification techniques during homemaking tasks and simple item retrieval/transport.  Outcome: Progressing  Goal: - Pt will increase standing tolerance x 8 minutes to promote greater activity tolerance for completion of adl/transfers  Outcome: Progressing

## 2023-10-02 NOTE — CARE PLAN
Pt will demonstrate using call light and appropriate safety measures to prevent falls by the end of this shift.    The clinical goals for the shift include Pain < 6 with the use of PRN medications for the duration of this aedmission

## 2023-10-02 NOTE — PROGRESS NOTES
"Chris Chance is a 42 y.o. male on day 3 of admission presenting with Edema of extremities.    Subjective   No events overnight. Patient seen and examined at bedside. Parents present. Patient complains of red rash under his abdominal folds. Discussed possibility of SNF for nursing care/wound care and IV antibiotics. Family lives near [facility] and would like to go there if able. Patient has been at [facility] in the past and did not like his experience.       Objective     Physical Exam  Constitutional: Well developed, awake/alert/oriented  x3, no distress, alert and cooperative   Eyes: clear sclera   ENMT: mucous membranes moist, no apparent injury,  no lesions seen   Head/Neck: Neck supple, no apparent injury   Respiratory/Thorax: Patent airways, CTAB, normal  breath sounds with good chest expansion, thorax symmetric   Cardiovascular: Regular rate and rhythm, no murmurs,  2+ equal pulses of the extremities, normal S 1and S 2   Gastrointestinal: distended, firm, non-tender, no  rebound tenderness or guarding, no masses palpable, no organomegaly, +BS   Musculoskeletal: ROM limited ble, no joint swelling,  normal strength   Extremities: normal extremities, no cyanosis, see  skin   Neurological: alert and oriented, intact motor response,  normal strength   Lymphatic: No significant lymphadenopathy   Psychological: Appropriate mood and behavior   Skin: Bilateral lower extremities erythematous, extremely  tender, warm, edematous, with missing epidermal layer with moist appearance     Last Recorded Vitals  Blood pressure 134/68, pulse (!) 117, temperature 36.4 °C (97.5 °F), temperature source Temporal, resp. rate 22, height 1.701 m (5' 6.97\"), weight (!) 173 kg (380 lb 11.8 oz), SpO2 95 %.  Intake/Output last 3 Shifts:  I/O last 3 completed shifts:  In: 1590 (9.2 mL/kg) [P.O.:590; I.V.:1000 (5.8 mL/kg)]  Out: - (0 mL/kg)   Weight: 172.7 kg     Relevant Results           This patient currently has " cardiac telemetry ordered; if you would like to modify or discontinue the telemetry order, click here to go to the orders activity to modify/discontinue the order.                 Assessment/Plan   Principal Problem:    Edema of extremities  Active Problems:    Tachycardia    TYRON WOOTEN is a 42 year old Male with a past medical history of hypertension, hyperlipidemia,  gout, and MICHAEL who presented today with bilateral lower extremity pain.      A/P:     1. BLE Cellulitis  Sepsis  Hyponatremia   Hypomagnesemia  Elevated alk phops     admit to tele  INPT  appreciate podiatry recommendations -- OR for debridement 9/29  Continue Vanco and Zosyn   Wound culture   CBC, CMP, magnesium in a.m.   Hold home blood pressure medications   Give 1 L IV fluid bolus then 100 cc/h   Repeat lactate pending   Morphine as needed  ID consult        2.  Shortness of breath on exertion   Anemia      Transfuse 1 unit pRBCs 9/29  Guaiac stool   Add BNP   CBC in a.m.         3.  Tachycardia    Consult Cardiology, appreciate recs      4.  Chronic conditions: Hypertension, hyperlipidemia, gout, MICHAEL      Continue home medications as listed above holding home ACE/hydrochlorothiazide due to sepsis      5.  DVT prophylaxis      Heparin     Dispo: anticipate SNF for IV antibiotics and wound care       I spent 45 minutes in the professional and overall care of this patient.      Erik Avila, DO

## 2023-10-02 NOTE — PROGRESS NOTES
"Chris Chance is a 42 y.o. male on day 4 of admission presenting with Edema of extremities.    Subjective   No events overnight. Patient seen and examined at bedside. Pain improved with increased pain meds. Dressings changed this morning.       Objective     Physical Exam  Constitutional: Well developed, awake/alert/oriented  x3, no distress, alert and cooperative   Eyes: clear sclera   ENMT: mucous membranes moist, no apparent injury,  no lesions seen   Head/Neck: Neck supple, no apparent injury   Respiratory/Thorax: Patent airways, CTAB, normal  breath sounds with good chest expansion, thorax symmetric   Cardiovascular: Regular rate and rhythm, no murmurs,  2+ equal pulses of the extremities, normal S 1and S 2   Gastrointestinal: distended, firm, non-tender, no  rebound tenderness or guarding, no masses palpable, no organomegaly, +BS   Musculoskeletal: ROM limited ble, no joint swelling,  normal strength   Extremities: normal extremities, no cyanosis, see  skin   Neurological: alert and oriented, intact motor response,  normal strength   Lymphatic: No significant lymphadenopathy   Psychological: Appropriate mood and behavior   Skin: Bilateral lower extremities erythematous, extremely  tender, warm, edematous, with missing epidermal layer with moist appearance     Last Recorded Vitals  Blood pressure 131/74, pulse 109, temperature 36.2 °C (97.2 °F), temperature source Temporal, resp. rate 20, height 1.701 m (5' 6.97\"), weight (!) 173 kg (380 lb 11.8 oz), SpO2 92 %.  Intake/Output last 3 Shifts:  I/O last 3 completed shifts:  In: 2600 (15.1 mL/kg) [I.V.:2000 (11.6 mL/kg); IV Piggyback:600]  Out: - (0 mL/kg)   Weight: 172.7 kg     Relevant Results           This patient currently has cardiac telemetry ordered; if you would like to modify or discontinue the telemetry order, click here to go to the orders activity to modify/discontinue the order.                 Assessment/Plan   Principal Problem:    Edema of " extremities  Active Problems:    Tachycardia    TYRON WOOTEN is a 42 year old Male with a past medical history of hypertension, hyperlipidemia,  gout, and MICHAEL who presented today with bilateral lower extremity pain.      A/P:     1. BLE Cellulitis  Sepsis  Hyponatremia   Hypomagnesemia  Elevated alk phops     admit to tele  INPT  appreciate podiatry recommendations -- OR for debridement 9/29  Continue Vanco and Zosyn   Wound culture   CBC, CMP, magnesium in a.m.   Hold home blood pressure medications   Give 1 L IV fluid bolus then 100 cc/h   Repeat lactate pending   Morphine and oxycodone as needed        2.  Shortness of breath on exertion   Anemia      Transfuse 1 unit pRBCs 9/29  Guaiac stool   Add BNP   CBC in a.m.         3.  Tachycardia    Consult Cardiology, appreciate recs      4.  Chronic conditions: Hypertension, hyperlipidemia, gout, MICHAEL      Continue home medications as listed above holding home ACE/hydrochlorothiazide due to sepsis      5.  DVT prophylaxis      Heparin     Dispo: anticipate SNF for IV antibiotics to complete Zosyn x2 weeks overall and wound care, medically ready       I spent 45 minutes in the professional and overall care of this patient.      Erik Avila, DO

## 2023-10-02 NOTE — PROGRESS NOTES
Chris Chance is a 42 y.o. male on day 4 of admission presenting with Edema of extremities.    Subjective   Pt was examined and evaluated this AM. S/P BLE Debridement, POD#3. Pt was laying comfortably in bed and expecting to be DC to SNF. Pt states his pain is tolerable. Denies constitution.        Objective     Physical Exam    Vascular: DP and PT pulses palpable b/l. Severe non-pitting edema b/l, L > R, improved from admission.       Derm:      Left:   Large circumferential venous ulcer spanning entire lower leg to level of subcutaneous tissue. Mixed granular and fibrotic base. No overlying slough. Serous drainage. No purulence. Mild malodor. Mild bleeding along posterior wound with dressing removal. Severe pain with dressing removal and light touch. No crepitation or fluctuance. No lymphatic streaking. Left lower leg edema has overall improved.      Pressure ulcer with skin breakdown along left plantar-lateral heel. Mild bleeding with dressing change. Pain with light touch. No other drainage.       Right:  Large venous ulcer along anterior and medial leg. Mixed granular and fibrotic base. Ulcer overall appears improved, likely due to decreased edema from compressive wraps. No overlying slough. Serous drainage. No purulence. No malodor. Mild bleeding with dressing removal. Severe pain with dressing removal and light touch. No crepitation or fluctuance. No lymphatic streaking. Right lower leg edema has overall improved.     Pressure ulcer overlying right medial subtalar joint area. Wound base is 80% granular, 10% fibrotic.   Proximal to this overlying the right medial calcaneus, there is a smaller area of skin breakdown.   The two ulcerated areas along the right foot are surrounding with mildly macerated skin, which was not present yesterday.      Neuro: Sensation to light touch intact b/l. Severe pain with light touch and dressing change.      MSK: Active ROM of ankles b/l and digits b/l intact. Able to lift  "b/l LE during dressing change, but limited due to severe pain of ulcers.             Last Recorded Vitals  Blood pressure 114/77, pulse (!) 111, temperature 36 °C (96.8 °F), temperature source Tympanic, resp. rate 22, height 1.701 m (5' 6.97\"), weight (!) 173 kg (380 lb 11.8 oz), SpO2 93 %.  Intake/Output last 3 Shifts:  I/O last 3 completed shifts:  In: 2600 (15.1 mL/kg) [I.V.:2000 (11.6 mL/kg); IV Piggyback:600]  Out: - (0 mL/kg)   Weight: 172.7 kg     Relevant Results           This patient currently has cardiac telemetry ordered; if you would like to modify or discontinue the telemetry order, click here to go to the orders activity to modify/discontinue the order.                 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 All findings were discussed with pt. All pt questions were answered to level of satisfaction.  - Wound appears stable at his time. Dressing changed and nursing informed.   - Dressing: adaptic around entire leg, calcium alginate, abd x 4, Kerlix, large ACE x 2.   - Start offloading left heel to prevent further skin breakdown of the area. Discussed with pt as well. RLE.   - For nursing: Please reinforce or change as needed.    - Podiatry will continue to follow       This patient was discussed with the attending, Dr. Libra Smalls DPM. Please await attending's signature for finalization of this note.     Alvin Fitzgerald DPM  Podiatry surgery resident, PGY-3              Alvin Fitzgerald DPM      "

## 2023-10-02 NOTE — CONSULTS
Vancomycin Dosing by Pharmacy- Cessation of Therapy    Consult to pharmacy for vancomycin dosing has been discontinued by the prescriber, pharmacy will sign off at this time.    Please call pharmacy if there are further questions or re-enter a consult if vancomycin is resumed.     Kim Cote, PharmD

## 2023-10-03 PROBLEM — R60.0 EDEMA OF EXTREMITIES: Status: RESOLVED | Noted: 2023-07-18 | Resolved: 2023-10-03

## 2023-10-03 LAB
ALBUMIN SERPL BCP-MCNC: 2.6 G/DL (ref 3.4–5)
ALP SERPL-CCNC: 94 U/L (ref 33–120)
ALT SERPL W P-5'-P-CCNC: 10 U/L (ref 10–52)
ANION GAP SERPL CALC-SCNC: 14 MMOL/L (ref 10–20)
AST SERPL W P-5'-P-CCNC: 22 U/L (ref 9–39)
BILIRUB SERPL-MCNC: 0.5 MG/DL (ref 0–1.2)
BUN SERPL-MCNC: 6 MG/DL (ref 6–23)
CALCIUM SERPL-MCNC: 7.5 MG/DL (ref 8.6–10.3)
CHLORIDE SERPL-SCNC: 101 MMOL/L (ref 98–107)
CO2 SERPL-SCNC: 24 MMOL/L (ref 21–32)
CREAT SERPL-MCNC: 1.12 MG/DL (ref 0.5–1.3)
ERYTHROCYTE [DISTWIDTH] IN BLOOD BY AUTOMATED COUNT: 15.4 % (ref 11.5–14.5)
GFR SERPL CREATININE-BSD FRML MDRD: 84 ML/MIN/1.73M*2
GLUCOSE SERPL-MCNC: 89 MG/DL (ref 74–99)
HCT VFR BLD AUTO: 27.1 % (ref 41–52)
HGB BLD-MCNC: 7.9 G/DL (ref 13.5–17.5)
MCH RBC QN AUTO: 32.2 PG (ref 26–34)
MCHC RBC AUTO-ENTMCNC: 29.2 G/DL (ref 32–36)
MCV RBC AUTO: 111 FL (ref 80–100)
NRBC BLD-RTO: 0 /100 WBCS (ref 0–0)
PLATELET # BLD AUTO: 262 X10*3/UL (ref 150–450)
PMV BLD AUTO: 8.3 FL (ref 7.5–11.5)
POTASSIUM SERPL-SCNC: 3.8 MMOL/L (ref 3.5–5.3)
PROT SERPL-MCNC: 5.2 G/DL (ref 6.4–8.2)
RBC # BLD AUTO: 2.45 X10*6/UL (ref 4.5–5.9)
SODIUM SERPL-SCNC: 135 MMOL/L (ref 136–145)
WBC # BLD AUTO: 9.3 X10*3/UL (ref 4.4–11.3)

## 2023-10-03 PROCEDURE — 2500000004 HC RX 250 GENERAL PHARMACY W/ HCPCS (ALT 636 FOR OP/ED): Performed by: NURSE PRACTITIONER

## 2023-10-03 PROCEDURE — 2500000004 HC RX 250 GENERAL PHARMACY W/ HCPCS (ALT 636 FOR OP/ED): Performed by: INTERNAL MEDICINE

## 2023-10-03 PROCEDURE — 36415 COLL VENOUS BLD VENIPUNCTURE: CPT | Performed by: NURSE PRACTITIONER

## 2023-10-03 PROCEDURE — 2500000001 HC RX 250 WO HCPCS SELF ADMINISTERED DRUGS (ALT 637 FOR MEDICARE OP): Performed by: NURSE PRACTITIONER

## 2023-10-03 PROCEDURE — 80053 COMPREHEN METABOLIC PANEL: CPT | Performed by: NURSE PRACTITIONER

## 2023-10-03 PROCEDURE — 99233 SBSQ HOSP IP/OBS HIGH 50: CPT | Performed by: INTERNAL MEDICINE

## 2023-10-03 PROCEDURE — 1200000002 HC GENERAL ROOM WITH TELEMETRY DAILY

## 2023-10-03 PROCEDURE — 85027 COMPLETE CBC AUTOMATED: CPT | Performed by: NURSE PRACTITIONER

## 2023-10-03 PROCEDURE — 94640 AIRWAY INHALATION TREATMENT: CPT

## 2023-10-03 PROCEDURE — 99232 SBSQ HOSP IP/OBS MODERATE 35: CPT | Performed by: INTERNAL MEDICINE

## 2023-10-03 RX ORDER — METOPROLOL SUCCINATE 50 MG/1
50 TABLET, EXTENDED RELEASE ORAL DAILY
Status: DISCONTINUED | OUTPATIENT
Start: 2023-10-04 | End: 2023-10-05

## 2023-10-03 RX ORDER — SIMETHICONE 80 MG
80 TABLET,CHEWABLE ORAL 3 TIMES DAILY PRN
Status: DISCONTINUED | OUTPATIENT
Start: 2023-10-03 | End: 2023-10-05 | Stop reason: HOSPADM

## 2023-10-03 RX ORDER — POLYETHYLENE GLYCOL 3350 17 G/17G
17 POWDER, FOR SOLUTION ORAL DAILY
Status: DISCONTINUED | OUTPATIENT
Start: 2023-10-03 | End: 2023-10-05 | Stop reason: HOSPADM

## 2023-10-03 RX ORDER — ALBUTEROL SULFATE 90 UG/1
2 AEROSOL, METERED RESPIRATORY (INHALATION) EVERY 2 HOUR PRN
Status: DISCONTINUED | OUTPATIENT
Start: 2023-10-03 | End: 2023-10-05 | Stop reason: HOSPADM

## 2023-10-03 RX ORDER — METOPROLOL SUCCINATE 50 MG/1
50 TABLET, EXTENDED RELEASE ORAL DAILY
Start: 2023-10-04 | End: 2023-11-09 | Stop reason: SDUPTHER

## 2023-10-03 RX ADMIN — NYSTATIN: 100000 POWDER TOPICAL at 20:47

## 2023-10-03 RX ADMIN — HEPARIN SODIUM 7500 UNITS: 5000 INJECTION INTRAVENOUS; SUBCUTANEOUS at 12:02

## 2023-10-03 RX ADMIN — HEPARIN SODIUM 7500 UNITS: 5000 INJECTION INTRAVENOUS; SUBCUTANEOUS at 20:47

## 2023-10-03 RX ADMIN — PIPERACILLIN SODIUM AND TAZOBACTAM SODIUM 4.5 G: 4; .5 INJECTION, SOLUTION INTRAVENOUS at 06:03

## 2023-10-03 RX ADMIN — METOPROLOL SUCCINATE 25 MG: 25 TABLET, EXTENDED RELEASE ORAL at 08:05

## 2023-10-03 RX ADMIN — PIPERACILLIN SODIUM AND TAZOBACTAM SODIUM 4.5 G: 4; .5 INJECTION, SOLUTION INTRAVENOUS at 16:36

## 2023-10-03 RX ADMIN — POLYETHYLENE GLYCOL 3350 17 G: 17 POWDER, FOR SOLUTION ORAL at 15:17

## 2023-10-03 RX ADMIN — FLUTICASONE FUROATE AND VILANTEROL 1 PUFF: 200; 25 POWDER RESPIRATORY (INHALATION) at 08:52

## 2023-10-03 RX ADMIN — NYSTATIN: 100000 POWDER TOPICAL at 08:06

## 2023-10-03 RX ADMIN — OXYCODONE HYDROCHLORIDE 5 MG: 5 TABLET ORAL at 12:04

## 2023-10-03 RX ADMIN — OXYCODONE HYDROCHLORIDE 5 MG: 5 TABLET ORAL at 03:56

## 2023-10-03 RX ADMIN — PIPERACILLIN SODIUM AND TAZOBACTAM SODIUM 4.5 G: 4; .5 INJECTION, SOLUTION INTRAVENOUS at 12:02

## 2023-10-03 RX ADMIN — PIPERACILLIN SODIUM AND TAZOBACTAM SODIUM 4.5 G: 4; .5 INJECTION, SOLUTION INTRAVENOUS at 23:10

## 2023-10-03 RX ADMIN — HEPARIN SODIUM 7500 UNITS: 5000 INJECTION INTRAVENOUS; SUBCUTANEOUS at 06:03

## 2023-10-03 RX ADMIN — OXYCODONE HYDROCHLORIDE 5 MG: 5 TABLET ORAL at 08:04

## 2023-10-03 RX ADMIN — OXYCODONE HYDROCHLORIDE 5 MG: 5 TABLET ORAL at 20:46

## 2023-10-03 ASSESSMENT — COGNITIVE AND FUNCTIONAL STATUS - GENERAL
MOVING TO AND FROM BED TO CHAIR: A LITTLE
STANDING UP FROM CHAIR USING ARMS: A LITTLE
MOBILITY SCORE: 24
MOBILITY SCORE: 20
DRESSING REGULAR LOWER BODY CLOTHING: A LITTLE
DAILY ACTIVITIY SCORE: 22
CLIMB 3 TO 5 STEPS WITH RAILING: A LITTLE
WALKING IN HOSPITAL ROOM: A LITTLE
DAILY ACTIVITIY SCORE: 24
HELP NEEDED FOR BATHING: A LITTLE

## 2023-10-03 ASSESSMENT — PAIN SCALES - GENERAL
PAINLEVEL_OUTOF10: 6
PAINLEVEL_OUTOF10: 7
PAINLEVEL_OUTOF10: 0 - NO PAIN
PAINLEVEL_OUTOF10: 0 - NO PAIN
PAINLEVEL_OUTOF10: 6
PAINLEVEL_OUTOF10: 0 - NO PAIN

## 2023-10-03 ASSESSMENT — PAIN SCALES - WONG BAKER
WONGBAKER_NUMERICALRESPONSE: NO HURT
WONGBAKER_NUMERICALRESPONSE: NO HURT

## 2023-10-03 ASSESSMENT — PAIN - FUNCTIONAL ASSESSMENT
PAIN_FUNCTIONAL_ASSESSMENT: 0-10

## 2023-10-03 NOTE — CARE PLAN
The patient's goals for the shift include  for patient to recognize when he is in respiratory distress    The clinical goals for the shift include Pain < 6    Over the shift, the patient did not make progress toward the following goals. Barriers to progression include not using oxygen when needing it. Recommendations to address these barriers include patient wearing c-pap at night and applying oxygen when in respiratory distress.    Problem: Respiratory  Goal: Minimal/no exertional discomfort or dyspnea this shift  Outcome: Not Progressing  Goal: No signs of respiratory distress (eg. Use of accessory muscles. Peds grunting)  Outcome: Not Progressing

## 2023-10-03 NOTE — CONSULTS
"Nutrition Assessment Note  Assessment    Assessment:  Reason for Assessment  Reason for Assessment:  (follow up)    History:  Food and Nutrient History  Energy Intake: Good > 75 %  Food and Nutrient History: Pt is taking 100% of his meals.  He said he will drink the Jesus for his wounds     Anthropometrics:  Height: 170.1 cm (5' 6.97\")  Weight: (!) 173 kg (380 lb 11.8 oz)  BMI (Calculated): 59.69    Weight Change: 0         IBW/kg (Dietitian Calculated): 74 kg  Percent of IBW: 235 %         BMI/Z-Score:  Facility age limit for growth %fr[place] is 20 years.    Energy Needs:  Calculated Energy Needs Using Equations  Height: 170.1 cm (5' 6.97\")    Estimated Energy Needs  Total Energy Estimated Needs (kCal):  (1047-9651)  Method for Estimating Needs: 28-32 karlos kg IBW + 10%    Estimated Protein Needs  Total Protein Estimated Needs (g):  (74-89)  Method for Estimating Needs: 1-1.2    Estimated Fluid Needs  Total Fluid Estimated Needs (mL):  (9866-5914 as medically indicated)         Nutrition Focused Physical Findings:  Subcutaneous Fat Loss  Orbital Fat Pads:  (deffered)      Diagnosis   Diagnosis:        Interventions/Recommendations   Interventions/Recommendations:  Nutrition Prescription  Individualized Nutrition Prescription Provided for : Continue cardiac diet.   continue Jesus X 2 per day    Food and/or Nutrient Delivery Interventions          Education Documentation  No documentation found.    Monitoring and Evaluation   Monitoring/Evaluation:  Food and Nutrient Related History        "

## 2023-10-03 NOTE — PROGRESS NOTES
Chris Chance is a 42 y.o. male on day 5 of admission presenting with Edema of extremities.    Subjective   Pt laying comfortably in bed without acute distress. Pt states pain is pretty tolerable. Denies constitution.       Physical Exam    Objective        Vascular: DP and PT pulses palpable b/l. Severe non-pitting edema b/l, L > R, improved from admission.      Derm:   Left:   Large circumferential venous ulcer spanning entire lower leg to level of subcutaneous tissue. Mixed granular and fibrotic base. No overlying slough. Serous drainage. No purulence. Mild malodor. Mild bleeding along posterior wound with dressing removal. Severe pain with dressing removal and light touch. No crepitation or fluctuance. No lymphatic streaking. Left lower leg edema has overall improved.      Pressure ulcer with skin breakdown along left plantar-lateral heel. Mild bleeding with dressing change. Pain with light touch. No other drainage.       Right:  Large venous ulcer along anterior and medial leg. Mixed granular and fibrotic base. Ulcer overall appears improved, likely due to decreased edema from compressive wraps. No overlying slough. Serous drainage. No purulence. No malodor. Mild bleeding with dressing removal. Severe pain with dressing removal and light touch. No crepitation or fluctuance. No lymphatic streaking. Right lower leg edema has overall improved.     Pressure ulcer overlying right medial subtalar joint area. Wound base is 80% granular, 10% fibrotic.   Proximal to this overlying the right medial calcaneus, there is a smaller area of skin breakdown.   The two ulcerated areas along the right foot are surrounding with mildly macerated skin, which was not present yesterday.      Neuro: unchanged      MSK: Active ROM of ankles b/l and digits b/l intact. Able to lift b/l LE during dressing change, but limited due to severe pain of ulcers.        Last Recorded Vitals  Blood pressure 121/75, pulse 107, temperature 35.8 °C  "(96.4 °F), temperature source Temporal, resp. rate 19, height 1.701 m (5' 6.97\"), weight (!) 173 kg (380 lb 11.8 oz), SpO2 95 %.    Intake/Output last 3 Shifts:  I/O last 3 completed shifts:  In: 1600 (9.3 mL/kg) [I.V.:1000 (5.8 mL/kg); IV Piggyback:600]  Out: - (0 mL/kg)   Weight: 172.7 kg     Relevant Results          Lab Results   Component Value Date    SEDRATE >130 (H) 09/28/2023    CRP 9.71 (A) 09/28/2023    BLOODCULT  09/27/2023     No Growth at 1 days~No Growth at 2 days~No Growth at 3 days~NO GROWTH at 4 days - FINAL REPORT    BLOODCULT  09/27/2023     No Growth at 1 days~No Growth at 2 days~No Growth at 3 days~NO GROWTH at 4 days - FINAL REPORT    TISWDCULTSME Staphylococcus aureus (A) 09/29/2023       XR CHEST 1 VIEW    - Impression -  No focal infiltrate or pneumothorax is identified.            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 All findings were discussed with pt. All pt questions were answered to level of satisfaction.  - Wound appears stable at his time. BLE, ulceration dressing changed. Dressing changed and nursing informed.   - Dressing: adaptic around entire leg, calcium alginate, abd x 4, Kerlix, large ACE x 2.   - Start offloading left heel to prevent further skin breakdown of the area. Discussed with pt as well. RLE.   - For nursing: Please reinforce or change as needed.    - Podiatry to sign off        This patient was discussed with the attending, Dr. Libra Smalls, LUDMILA. Please await attending's signature for finalization of this note.         Alvin Fitzgerald DPM  Podiatry resident, PGY-3      "

## 2023-10-03 NOTE — PROGRESS NOTES
Subjective Data:  Denies any chest pain or palpitations-does have some dyspnea on exertion going up to the bathroom  Still with leg discomfort    Overnight Events:    Leg pain     Objective Data:  Telemetry with mild sinus tachycardia  Last Recorded Vitals:  Vitals:    10/02/23 2200 10/03/23 0200 10/03/23 0604 10/03/23 0948   BP: 107/59 129/83 150/65 121/75   BP Location: Left arm Left arm Right arm Left arm   Patient Position: Lying  Sitting Sitting   Pulse: 109 (!) 111 109 107   Resp: 22 20 20 19   Temp: 36.3 °C (97.3 °F) 36.2 °C (97.2 °F) 35.7 °C (96.3 °F) 35.8 °C (96.4 °F)   TempSrc: Tympanic Tympanic Tympanic Temporal   SpO2: 93% 93% 94% 95%   Weight:       Height:           Last Labs:  CBC - 10/3/2023:  6:22 AM  9.3 7.9 262    27.1      CMP - 10/3/2023:  6:22 AM  7.5 5.2 22 --- 0.5   _ 2.6 10 94      PTT - No results in last year.  _   _ _     TROPHS   Date/Time Value Ref Range Status   09/27/2023 11:14 AM 4 0 - 20 ng/L Final     Comment:     .  Less than 99th percentile of normal range cutoff-  Female and children under 18 years old <14 ng/L; Male <21 ng/L: Negative  Repeat testing should be performed if clinically indicated.   .  Female and children under 18 years old 14-50 ng/L; Male 21-50 ng/L:  Consistent with possible cardiac damage and possible increased clinical   risk. Serial measurements may help to assess extent of myocardial damage.   .  >50 ng/L: Consistent with cardiac damage, increased clinical risk and  myocardial infarction. Serial measurements may help assess extent of   myocardial damage.   .   NOTE: Children less than 1 year old may have higher baseline troponin   levels and results should be interpreted in conjunction with the overall   clinical context.   .  NOTE: Troponin I testing is performed using a different   testing methodology at [facility] than at other   [facility] hospitals. Direct result comparisons should only   be made within the same method.       BNP   Date/Time  Value Ref Range Status   09/27/2023 11:12 AM 7 0 - 99 pg/mL Final     Comment:     .  <100 pg/mL - Heart failure unlikely  100-299 pg/mL - Intermediate probability of acute heart  .               failure exacerbation. Correlate with clinical  .               context and patient history.    >=300 pg/mL - Heart Failure likely. Correlate with clinical  .               context and patient history.  BNP testing is performed using different testing   methodology at [facility] than at other   [facility] hospitals. Direct result comparisons should   only be made within the same method.       HGBA1C   Date/Time Value Ref Range Status   09/29/2023 08:38 AM 5.3 see below % Final     VLDL   Date/Time Value Ref Range Status   04/27/2022 09:06 AM 28 0 - 40 mg/dL Final   04/23/2021 11:49 AM 20 0 - 40 mg/dL Final      Last I/O:  I/O last 3 completed shifts:  In: 1600 (9.3 mL/kg) [I.V.:1000 (5.8 mL/kg); IV Piggyback:600]  Out: - (0 mL/kg)   Weight: 172.7 kg     Past Cardiology Tests (Last 3 Years):  EKG:  No results found for this or any previous visit from the past 1095 days.    Echo:  Transthoracic Echo (TTE) Complete 9/30/2023    Ejection Fractions:  55 to 60%  Cath:  No results found for this or any previous visit from the past 1095 days.    Stress Test:  No results found for this or any previous visit from the past 1095 days.    Cardiac Imaging:  Chest CT  1. No evidence of pulmonary emboli, dissection or aneurysm given  limitations.  2. Probable multifocal pneumonia as described.    Inpatient Medications:  Scheduled medications   Medication Dose Route Frequency    fluticasone furoate-vilanteroL  1 puff inhalation Daily    heparin (porcine)  7,500 Units subcutaneous q8h    metoprolol succinate XL  25 mg oral Daily    nystatin   Topical BID    piperacillin-tazobactam  4.5 g intravenous q6h     PRN medications   Medication    acetaminophen    albuterol    oxyCODONE    oxyCODONE    traMADol     Continuous Medications    Medication Dose Last Rate    sodium chloride 0.9%  100 mL/hr 100 mL/hr (10/02/23 1100)       Physical Exam:  Constitutional:       General: He is not in acute distress.     Appearance: He is obese.   Neck:      Vascular: No carotid bruit, hepatojugular reflux or JVD.   Cardiovascular:      Rate and Rhythm:  Regular rhythm with increased rate     Pulses:  decreased pulses.      Heart sounds: Normal heart sounds.  legs are wrapped and edematous        Pulmonary:      Effort: Pulmonary effort is normal.   Abdominal:      General: Bowel sounds are normal.   Musculoskeletal:      Comments: Normal strength   Skin:     General: Skin is warm and dry.   Neurological:      General: No focal deficit present.      Mental Status: He is alert.   Psychiatric:         Attention and Perception: Attention normal.      Assessment/Plan   1.  Sepsis   2.  Cellulitis   3.  Anemia   4.  Sinus tachycardia   5.  Pneumonia/shortness of breath  - 42-year-old male with history of hypertension presented to hospital with edematous painful legs was diagnosed with cellulitis/sepsis.  Patient with persistent sinus tachycardia/dyspnea on exertion.  Echocardiogram revealed normal left ventricular systolic function.  Was concerned about the possibility of pulmonary embolism but 10/2/2023 chest CT negative for PE.  -Remains tachycardic although still has anemia and pneumonia was noted on chest CT.  We will titrate up beta-blocker.  -We will continue to monitor with you    Site Assessment Clean;Dry;Intact 10/01/23 1115   Dressing Status Clean;Dry 10/01/23 1115   Number of days:        Peripheral IV 09/29/23 20 G Left;Posterior Hand (Active)   Site Assessment Clean;Dry;Intact 10/01/23 1116   Dressing Status Clean;Dry 10/01/23 1116   Number of days: 2       Code Status:  No Order            Rudolph Persaud DO

## 2023-10-03 NOTE — DISCHARGE INSTRUCTIONS
Wound care instructions:  - Daily changes in the form of adaptic, 4x4, ABD, kerlix and ACE and secure with tape  - Weight bearing to tolerance  - Please do not get ulceration wet in between dressing changes  - Please call to schedule your appointment with Dr Smalls in a week from your Discharge

## 2023-10-04 LAB
ALANINE AMINOTRANSFERASE (SGPT) (U/L) IN SER/PLAS: NORMAL
ALBUMIN (G/DL) IN SER/PLAS: NORMAL
ALBUMIN SERPL BCP-MCNC: 2.5 G/DL (ref 3.4–5)
ALKALINE PHOSPHATASE (U/L) IN SER/PLAS: NORMAL
ALP SERPL-CCNC: 77 U/L (ref 33–120)
ALT SERPL W P-5'-P-CCNC: 8 U/L (ref 10–52)
ANION GAP IN SER/PLAS: NORMAL
ANION GAP SERPL CALC-SCNC: 14 MMOL/L (ref 10–20)
ASPARTATE AMINOTRANSFERASE (SGOT) (U/L) IN SER/PLAS: NORMAL
AST SERPL W P-5'-P-CCNC: 18 U/L (ref 9–39)
BILIRUB SERPL-MCNC: 1 MG/DL (ref 0–1.2)
BILIRUBIN TOTAL (MG/DL) IN SER/PLAS: NORMAL
BUN SERPL-MCNC: 9 MG/DL (ref 6–23)
CALCIUM (MG/DL) IN SER/PLAS: NORMAL
CALCIUM SERPL-MCNC: 7.6 MG/DL (ref 8.6–10.3)
CARBON DIOXIDE, TOTAL (MMOL/L) IN SER/PLAS: NORMAL
CHLORIDE (MMOL/L) IN SER/PLAS: NORMAL
CHLORIDE SERPL-SCNC: 100 MMOL/L (ref 98–107)
CO2 SERPL-SCNC: 23 MMOL/L (ref 21–32)
CREAT SERPL-MCNC: 1.38 MG/DL (ref 0.5–1.3)
CREATININE (MG/DL) IN SER/PLAS: NORMAL
ERYTHROCYTE DISTRIBUTION WIDTH (RATIO) BY AUTOMATED COUNT: NORMAL
ERYTHROCYTE MEAN CORPUSCULAR HEMOGLOBIN CONCENTRATION (G/DL) BY AUTOMATED: NORMAL
ERYTHROCYTE MEAN CORPUSCULAR VOLUME (FL) BY AUTOMATED COUNT: NORMAL
ERYTHROCYTE [DISTWIDTH] IN BLOOD BY AUTOMATED COUNT: 15.7 % (ref 11.5–14.5)
ERYTHROCYTES (10*6/UL) IN BLOOD BY AUTOMATED COUNT: NORMAL
GFR FEMALE: NORMAL
GFR MALE: NORMAL
GFR SERPL CREATININE-BSD FRML MDRD: 65 ML/MIN/1.73M*2
GLOMERULAR FILTRATION RATE-AFRICAN AMERICAN: NORMAL ML/MIN/1.73M2
GLOMERULAR FILTRATION RATE-NON AFRICAN AMERICAN: NORMAL ML/MIN/1.73M2
GLUCOSE (MG/DL) IN SER/PLAS: NORMAL
GLUCOSE SERPL-MCNC: 99 MG/DL (ref 74–99)
GRAM STAIN: NORMAL
HCT VFR BLD AUTO: 27.7 % (ref 41–52)
HEMATOCRIT (%) IN BLOOD BY AUTOMATED COUNT: NORMAL
HEMOGLOBIN (G/DL) IN BLOOD: NORMAL
HGB BLD-MCNC: 8.4 G/DL (ref 13.5–17.5)
LEUKOCYTES (10*3/UL) IN BLOOD BY AUTOMATED COUNT: NORMAL
MCH RBC QN AUTO: 32.8 PG (ref 26–34)
MCHC RBC AUTO-ENTMCNC: 30.3 G/DL (ref 32–36)
MCV RBC AUTO: 108 FL (ref 80–100)
NRBC (PER 100 WBCS) BY AUTOMATED COUNT: NORMAL
NRBC BLD-RTO: 0.1 /100 WBCS (ref 0–0)
PLATELET # BLD AUTO: 291 X10*3/UL (ref 150–450)
PLATELETS (10*3/UL) IN BLOOD AUTOMATED COUNT: NORMAL
PMV BLD AUTO: 8.5 FL (ref 7.5–11.5)
POTASSIUM (MMOL/L) IN SER/PLAS: NORMAL
POTASSIUM SERPL-SCNC: 3.9 MMOL/L (ref 3.5–5.3)
PROT SERPL-MCNC: 4.9 G/DL (ref 6.4–8.2)
PROTEIN TOTAL: NORMAL
RBC # BLD AUTO: 2.56 X10*6/UL (ref 4.5–5.9)
SODIUM (MMOL/L) IN SER/PLAS: NORMAL
SODIUM SERPL-SCNC: 133 MMOL/L (ref 136–145)
TISSUE/WOUND CULTURE/SMEAR: NORMAL
UREA NITROGEN (MG/DL) IN SER/PLAS: NORMAL
VANCOMYCIN (UG/ML) IN SER/PLAS: NORMAL
WBC # BLD AUTO: 15.7 X10*3/UL (ref 4.4–11.3)

## 2023-10-04 PROCEDURE — 2500000004 HC RX 250 GENERAL PHARMACY W/ HCPCS (ALT 636 FOR OP/ED): Performed by: INTERNAL MEDICINE

## 2023-10-04 PROCEDURE — 80053 COMPREHEN METABOLIC PANEL: CPT | Performed by: NURSE PRACTITIONER

## 2023-10-04 PROCEDURE — 94640 AIRWAY INHALATION TREATMENT: CPT

## 2023-10-04 PROCEDURE — 85027 COMPLETE CBC AUTOMATED: CPT | Performed by: NURSE PRACTITIONER

## 2023-10-04 PROCEDURE — 1200000002 HC GENERAL ROOM WITH TELEMETRY DAILY

## 2023-10-04 PROCEDURE — 36415 COLL VENOUS BLD VENIPUNCTURE: CPT | Performed by: NURSE PRACTITIONER

## 2023-10-04 PROCEDURE — 97530 THERAPEUTIC ACTIVITIES: CPT | Mod: GO

## 2023-10-04 PROCEDURE — 99232 SBSQ HOSP IP/OBS MODERATE 35: CPT | Performed by: INTERNAL MEDICINE

## 2023-10-04 PROCEDURE — 2500000001 HC RX 250 WO HCPCS SELF ADMINISTERED DRUGS (ALT 637 FOR MEDICARE OP): Performed by: INTERNAL MEDICINE

## 2023-10-04 PROCEDURE — 2500000001 HC RX 250 WO HCPCS SELF ADMINISTERED DRUGS (ALT 637 FOR MEDICARE OP): Performed by: NURSE PRACTITIONER

## 2023-10-04 PROCEDURE — 2500000004 HC RX 250 GENERAL PHARMACY W/ HCPCS (ALT 636 FOR OP/ED): Performed by: NURSE PRACTITIONER

## 2023-10-04 RX ADMIN — SODIUM CHLORIDE 100 ML/HR: 9 INJECTION, SOLUTION INTRAVENOUS at 20:34

## 2023-10-04 RX ADMIN — NYSTATIN: 100000 POWDER TOPICAL at 09:00

## 2023-10-04 RX ADMIN — OXYCODONE HYDROCHLORIDE 5 MG: 5 TABLET ORAL at 05:45

## 2023-10-04 RX ADMIN — HEPARIN SODIUM 7500 UNITS: 5000 INJECTION INTRAVENOUS; SUBCUTANEOUS at 20:18

## 2023-10-04 RX ADMIN — NYSTATIN: 100000 POWDER TOPICAL at 20:20

## 2023-10-04 RX ADMIN — HEPARIN SODIUM 7500 UNITS: 5000 INJECTION INTRAVENOUS; SUBCUTANEOUS at 13:00

## 2023-10-04 RX ADMIN — HEPARIN SODIUM 7500 UNITS: 5000 INJECTION INTRAVENOUS; SUBCUTANEOUS at 05:46

## 2023-10-04 RX ADMIN — FLUTICASONE FUROATE AND VILANTEROL 1 PUFF: 200; 25 POWDER RESPIRATORY (INHALATION) at 07:28

## 2023-10-04 RX ADMIN — METOPROLOL SUCCINATE 50 MG: 50 TABLET, EXTENDED RELEASE ORAL at 08:58

## 2023-10-04 RX ADMIN — PIPERACILLIN SODIUM AND TAZOBACTAM SODIUM 4.5 G: 4; .5 INJECTION, SOLUTION INTRAVENOUS at 05:45

## 2023-10-04 RX ADMIN — OXYCODONE HYDROCHLORIDE 5 MG: 5 TABLET ORAL at 12:23

## 2023-10-04 RX ADMIN — OXYCODONE HYDROCHLORIDE 5 MG: 5 TABLET ORAL at 20:19

## 2023-10-04 RX ADMIN — POLYETHYLENE GLYCOL 3350 17 G: 17 POWDER, FOR SOLUTION ORAL at 08:55

## 2023-10-04 RX ADMIN — PIPERACILLIN SODIUM AND TAZOBACTAM SODIUM 4.5 G: 4; .5 INJECTION, SOLUTION INTRAVENOUS at 11:22

## 2023-10-04 RX ADMIN — PIPERACILLIN SODIUM AND TAZOBACTAM SODIUM 4.5 G: 4; .5 INJECTION, SOLUTION INTRAVENOUS at 17:00

## 2023-10-04 ASSESSMENT — PAIN SCALES - PAIN ASSESSMENT IN ADVANCED DEMENTIA (PAINAD)
FACIALEXPRESSION: FACIAL GRIMACING
BODYLANGUAGE: TENSE, DISTRESSED PACING, FIDGETING
BREATHING: NORMAL

## 2023-10-04 ASSESSMENT — COGNITIVE AND FUNCTIONAL STATUS - GENERAL
PERSONAL GROOMING: A LITTLE
DRESSING REGULAR UPPER BODY CLOTHING: A LITTLE
HELP NEEDED FOR BATHING: A LOT
DAILY ACTIVITIY SCORE: 16
DAILY ACTIVITIY SCORE: 24
DRESSING REGULAR LOWER BODY CLOTHING: TOTAL
MOBILITY SCORE: 24
TOILETING: A LITTLE

## 2023-10-04 ASSESSMENT — PAIN SCALES - GENERAL
PAINLEVEL_OUTOF10: 0 - NO PAIN
PAINLEVEL_OUTOF10: 6
PAINLEVEL_OUTOF10: 3
PAINLEVEL_OUTOF10: 7
PAINLEVEL_OUTOF10: 0 - NO PAIN
PAINLEVEL_OUTOF10: 0 - NO PAIN
PAINLEVEL_OUTOF10: 3
PAINLEVEL_OUTOF10: 8

## 2023-10-04 ASSESSMENT — PAIN SCALES - WONG BAKER
WONGBAKER_NUMERICALRESPONSE: HURTS WHOLE LOT
WONGBAKER_NUMERICALRESPONSE: HURTS LITTLE BIT
WONGBAKER_NUMERICALRESPONSE: HURTS LITTLE BIT
WONGBAKER_NUMERICALRESPONSE: HURTS LITTLE MORE
WONGBAKER_NUMERICALRESPONSE: NO HURT

## 2023-10-04 ASSESSMENT — PAIN - FUNCTIONAL ASSESSMENT
PAIN_FUNCTIONAL_ASSESSMENT: 0-10

## 2023-10-04 ASSESSMENT — PAIN DESCRIPTION - DESCRIPTORS
DESCRIPTORS: ACHING
DESCRIPTORS: ACHING

## 2023-10-04 NOTE — PROGRESS NOTES
"Tyron Wooten is a 42 y.o. male on day 6 of admission presenting with Edema of extremities.    Subjective   No events overnight. Patient seen and examined at bedside. No complaints. Awaiting precert for SNF.       Objective     Physical Exam  Constitutional: Well developed, awake/alert/oriented  x3, no distress, alert and cooperative   Eyes: clear sclera   ENMT: mucous membranes moist, no apparent injury,  no lesions seen   Head/Neck: Neck supple, no apparent injury   Respiratory/Thorax: Patent airways, CTAB, normal  breath sounds with good chest expansion, thorax symmetric   Cardiovascular: Regular rate and rhythm, no murmurs,  2+ equal pulses of the extremities, normal S 1and S 2   Gastrointestinal: distended, firm, non-tender, no  rebound tenderness or guarding, no masses palpable, no organomegaly, +BS   Musculoskeletal: ROM limited ble, no joint swelling,  normal strength   Extremities: normal extremities, no cyanosis, see  skin   Neurological: alert and oriented, intact motor response,  normal strength   Lymphatic: No significant lymphadenopathy   Psychological: Appropriate mood and behavior   Skin: Bilateral lower extremities erythematous, extremely  tender, warm, edematous, with missing epidermal layer with moist appearance     Last Recorded Vitals  Blood pressure 130/58, pulse (!) 111, temperature 36 °C (96.8 °F), temperature source Temporal, resp. rate 19, height 1.701 m (5' 6.97\"), weight (!) 173 kg (380 lb 11.8 oz), SpO2 96 %.  Intake/Output last 3 Shifts:  No intake/output data recorded.    Relevant Results           This patient currently has cardiac telemetry ordered; if you would like to modify or discontinue the telemetry order, click here to go to the orders activity to modify/discontinue the order.                 Assessment/Plan   Active Problems:    Tachycardia    TYRON WOOTEN is a 42 year old Male with a past medical history of hypertension, hyperlipidemia,  gout, and MICHAEL who presented " today with bilateral lower extremity pain.      A/P:     1. BLE Cellulitis  Sepsis  Multifocal pneumonia   Hyponatremia   Hypomagnesemia  Elevated alk phops     admit to tele  INPT  appreciate podiatry recommendations -- OR for debridement 9/29  Continue Vanco and Zosyn   Wound culture   CBC, CMP, magnesium in a.m.   Hold home blood pressure medications   Give 1 L IV fluid bolus then 100 cc/h   Repeat lactate pending   Morphine and oxycodone as needed        2.  Shortness of breath on exertion   Anemia      Transfuse 1 unit pRBCs 9/29  Guaiac stool   Add BNP   CBC in a.m.         3.  Tachycardia    Consult Cardiology, appreciate recs  CTA chest negative for PE  Increase metoprolol    4.  Chronic conditions: Hypertension, hyperlipidemia, gout, MICHAEL      Continue home medications as listed above holding home ACE/hydrochlorothiazide due to sepsis      5.  DVT prophylaxis      Heparin     Dispo: anticipate SNF for IV antibiotics to complete Zosyn x2 weeks overall and wound care, medically ready       I spent 45 minutes in the professional and overall care of this patient.      Erik Avila, DO

## 2023-10-04 NOTE — CARE PLAN
The patient's goals for the shift include no pain    The clinical goals for the shift include patient will remain free from pain or injury    Over the shift, the patient did not make progress toward the following goals. Barriers to progression include acute illness and non compliance with healthcare regimen. Recommendations to address these barriers include reinforcement of importance of following the doctors reccomendations.    Problem: Pain  Goal: Takes deep breaths with improved pain control throughout the shift  Outcome: Not Progressing     Problem: Respiratory  Goal: Minimal/no exertional discomfort or dyspnea this shift  Outcome: Not Progressing

## 2023-10-04 NOTE — PROGRESS NOTES
"Tyron Wooten is a 42 y.o. male on day 5 of admission presenting with Edema of extremities.    Subjective   No events overnight. Patient seen and examined at bedside. No complaints.       Objective     Physical Exam  Constitutional: Well developed, awake/alert/oriented  x3, no distress, alert and cooperative   Eyes: clear sclera   ENMT: mucous membranes moist, no apparent injury,  no lesions seen   Head/Neck: Neck supple, no apparent injury   Respiratory/Thorax: Patent airways, CTAB, normal  breath sounds with good chest expansion, thorax symmetric   Cardiovascular: Regular rate and rhythm, no murmurs,  2+ equal pulses of the extremities, normal S 1and S 2   Gastrointestinal: distended, firm, non-tender, no  rebound tenderness or guarding, no masses palpable, no organomegaly, +BS   Musculoskeletal: ROM limited ble, no joint swelling,  normal strength   Extremities: normal extremities, no cyanosis, see  skin   Neurological: alert and oriented, intact motor response,  normal strength   Lymphatic: No significant lymphadenopathy   Psychological: Appropriate mood and behavior   Skin: Bilateral lower extremities erythematous, extremely  tender, warm, edematous, with missing epidermal layer with moist appearance     Last Recorded Vitals  Blood pressure 121/69, pulse (!) 124, temperature 37.4 °C (99.3 °F), temperature source Temporal, resp. rate 18, height 1.701 m (5' 6.97\"), weight (!) 173 kg (380 lb 11.8 oz), SpO2 98 %.  Intake/Output last 3 Shifts:  No intake/output data recorded.    Relevant Results           This patient currently has cardiac telemetry ordered; if you would like to modify or discontinue the telemetry order, click here to go to the orders activity to modify/discontinue the order.                 Assessment/Plan   Active Problems:    Tachycardia    TYRON WOOTEN is a 42 year old Male with a past medical history of hypertension, hyperlipidemia,  gout, and MICHAEL who presented today with bilateral " lower extremity pain.      A/P:     1. BLE Cellulitis  Sepsis  Multifocal pneumonia   Hyponatremia   Hypomagnesemia  Elevated alk phops     admit to tele  INPT  appreciate podiatry recommendations -- OR for debridement 9/29  Continue Vanco and Zosyn   Wound culture   CBC, CMP, magnesium in a.m.   Hold home blood pressure medications   Give 1 L IV fluid bolus then 100 cc/h   Repeat lactate pending   Morphine and oxycodone as needed        2.  Shortness of breath on exertion   Anemia      Transfuse 1 unit pRBCs 9/29  Guaiac stool   Add BNP   CBC in a.m.         3.  Tachycardia    Consult Cardiology, appreciate recs  CTA chest negative for PE      4.  Chronic conditions: Hypertension, hyperlipidemia, gout, MICHAEL      Continue home medications as listed above holding home ACE/hydrochlorothiazide due to sepsis      5.  DVT prophylaxis      Heparin     Dispo: anticipate SNF for IV antibiotics to complete Zosyn x2 weeks overall and wound care, medically ready       I spent 45 minutes in the professional and overall care of this patient.      Erik Avila, DO

## 2023-10-04 NOTE — PROGRESS NOTES
Subjective Data:  Denies any chest pain or palpitations-does have some dyspnea on exertion going up to the bathroom  Still with leg discomfort    Overnight Events:    Leg pain     Objective Data:  Telemetry with mild sinus tachycardia  Last Recorded Vitals:  Vitals:    10/04/23 0521 10/04/23 0728 10/04/23 0858 10/04/23 0955   BP: (!) 124/93  129/60 130/58   BP Location: Left arm   Left arm   Patient Position:    Sitting   Pulse: (!) 117  (!) 115 (!) 111   Resp: 18  18 19   Temp: 36.4 °C (97.5 °F)   36 °C (96.8 °F)   TempSrc: Temporal   Temporal   SpO2:  94% 93% 96%   Weight:       Height:           Last Labs:  CBC - 10/4/2023:  6:22 AM  15.7 8.4 291    27.7      CMP - 10/4/2023:  6:22 AM  7.6 4.9 18 --- 1.0   _ 2.5 8 77      PTT - No results in last year.  _   _ _     TROPHS   Date/Time Value Ref Range Status   09/27/2023 11:14 AM 4 0 - 20 ng/L Final     Comment:     .  Less than 99th percentile of normal range cutoff-  Female and children under 18 years old <14 ng/L; Male <21 ng/L: Negative  Repeat testing should be performed if clinically indicated.   .  Female and children under 18 years old 14-50 ng/L; Male 21-50 ng/L:  Consistent with possible cardiac damage and possible increased clinical   risk. Serial measurements may help to assess extent of myocardial damage.   .  >50 ng/L: Consistent with cardiac damage, increased clinical risk and  myocardial infarction. Serial measurements may help assess extent of   myocardial damage.   .   NOTE: Children less than 1 year old may have higher baseline troponin   levels and results should be interpreted in conjunction with the overall   clinical context.   .  NOTE: Troponin I testing is performed using a different   testing methodology at [facility] than at other   [facility] hospitals. Direct result comparisons should only   be made within the same method.       BNP   Date/Time Value Ref Range Status   09/27/2023 11:12 AM 7 0 - 99 pg/mL Final     Comment:      .  <100 pg/mL - Heart failure unlikely  100-299 pg/mL - Intermediate probability of acute heart  .               failure exacerbation. Correlate with clinical  .               context and patient history.    >=300 pg/mL - Heart Failure likely. Correlate with clinical  .               context and patient history.  BNP testing is performed using different testing   methodology at [facility] than at other   [facility]. Direct result comparisons should   only be made within the same method.       HGBA1C   Date/Time Value Ref Range Status   09/29/2023 08:38 AM 5.3 see below % Final     VLDL   Date/Time Value Ref Range Status   04/27/2022 09:06 AM 28 0 - 40 mg/dL Final   04/23/2021 11:49 AM 20 0 - 40 mg/dL Final      Last I/O:  No intake/output data recorded.    Past Cardiology Tests (Last 3 Years):  EKG:  No results found for this or any previous visit from the past 1095 days.    Echo:  Transthoracic Echo (TTE) Complete 9/30/2023    Ejection Fractions:  55 to 60%  Cath:  No results found for this or any previous visit from the past 1095 days.    Stress Test:  No results found for this or any previous visit from the past 1095 days.    Cardiac Imaging:  Chest CT  1. No evidence of pulmonary emboli, dissection or aneurysm given  limitations.  2. Probable multifocal pneumonia as described.    Inpatient Medications:  Scheduled medications   Medication Dose Route Frequency    fluticasone furoate-vilanteroL  1 puff inhalation Daily    heparin (porcine)  7,500 Units subcutaneous q8h    metoprolol succinate XL  50 mg oral Daily    nystatin   Topical BID    piperacillin-tazobactam  4.5 g intravenous q6h    polyethylene glycol  17 g oral Daily     PRN medications   Medication    acetaminophen    albuterol    oxyCODONE    oxyCODONE    simethicone    traMADol     Continuous Medications   Medication Dose Last Rate    sodium chloride 0.9%  100 mL/hr 100 mL/hr (10/02/23 1100)       Physical Exam:  Constitutional:        General: He is not in acute distress.     Appearance: He is obese.   Neck:      Vascular: No carotid bruit, hepatojugular reflux or JVD.   Cardiovascular:      Rate and Rhythm:  Regular rhythm with increased rate     Pulses:  decreased pulses.      Heart sounds: Normal heart sounds.  legs are wrapped and edematous        Pulmonary:      Effort: Pulmonary effort is normal.   Abdominal:      General: Bowel sounds are normal.   Musculoskeletal:      Comments: Normal strength   Skin:     General: Skin is warm and dry.   Neurological:      General: No focal deficit present.      Mental Status: He is alert.   Psychiatric:         Attention and Perception: Attention normal.      Assessment/Plan   1.  Sepsis   2.  Cellulitis   3.  Anemia   4.  Sinus tachycardia   5.  Pneumonia/shortness of breath  - 42-year-old male with history of hypertension presented to hospital with edematous painful legs was diagnosed with cellulitis/sepsis.  Patient with persistent sinus tachycardia/dyspnea on exertion.  Echocardiogram revealed normal left ventricular systolic function.  Was concerned about the possibility of pulmonary embolism but 10/2/2023 chest CT negative for PE.  -Remains tachycardic although still has anemia and pneumonia was noted on chest CT. Titrated up beta-blocker 10/3/23.  -We will continue to monitor with you    Site Assessment Clean;Dry;Intact 10/01/23 1115   Dressing Status Clean;Dry 10/01/23 1115   Number of days:        Peripheral IV 09/29/23 20 G Left;Posterior Hand (Active)   Site Assessment Clean;Dry;Intact 10/01/23 1116   Dressing Status Clean;Dry 10/01/23 1116   Number of days: 2       Code Status:  No Order            Rudolph Persaud DO

## 2023-10-04 NOTE — NURSING NOTE
Patient's left leg weeping and ace wrap came undone. Per patient he would just like the kirlex reinforced and ace wrap replaced. After dressing was reinforced a total bed and gown change done.

## 2023-10-04 NOTE — CARE PLAN
The patient's goals for the shift include  for patient for recognize when he is in respiratory distress.    The clinical goals for the shift include Pain < 6    Over the shift, the patient did not make progress toward the following goals. Barriers to progression include not using O2 when in respiratory distress. Recommendations to address these barriers include apply 2L of o2 after ambulating to restroom.    Problem: Respiratory  Goal: Minimal/no exertional discomfort or dyspnea this shift  Outcome: Not Progressing  Goal: No signs of respiratory distress (eg. Use of accessory muscles. Peds grunting)  Outcome: Not Progressing

## 2023-10-04 NOTE — PROGRESS NOTES
Occupational Therapy    Occupational Therapy Treatment    Name: Chris Chance  MRN: [id number]  : 1980  Date: 10/04/23  Time Calculation  Start Time: 1013  Stop Time: 1037  Time Calculation (min): 24 min    Assessment:  Barriers to Discharge:  (IV FLUIDS, BLE DRESSING CHANGES/WOUND CARE)  Evaluation/Treatment Tolerance: Patient limited by fatigue, Patient tolerated treatment well  Plan:  Treatment Interventions: ADL retraining, Patient/family training, Compensatory technique education, Endurance training  OT Frequency: 3 times per week  OT Discharge Recommendations: Low intensity level of continued care  OT Recommended Transfer Status: Stand by assist    Subjective   General:  OT Last Visit  OT Received On: 10/04/23  General  Reason for Referral:  (ADL AND SAFETY ASSESSMENT. PT ADMITTED WITH BLE LEG PAIN, S/P EXCISIONAL DEBRIDEMNT )  Referred By: RAMON GALINDO  Past Medical History Relevant to Rehab:  (HTN, HLD, asthma, gout, MICHAEL, obesity)  Family/Caregiver Present: No  Prior to Session Communication: Bedside nurse  Patient Position Received: Bed, 2 rail up  Preferred Learning Style: verbal, visual  General Comment:  (PT LEGS JUST WRAPPED THIS AM, ATTEMPTED TO SEE PT EARLIER, BUT PT IN BATHROOM. PT NOW IN BED. BUT AGREEABLE TO THERAPY EVAL.)  Vitals:     Pain Assessment:  Pain Assessment  Pain Assessment: 0-10  Pain Score: 6     Objective  PT REPORTING THAT HE IS GOING TO NH FOR FURTHER THERAPY.      Functional Standing Tolerance:  Functional Standing Tolerance  Time:  (PT ABLE TO TOLERATE STATIC STANDING X 1 MINUTE UP TO WALKER, WITH SIGNIFICANT SOB.)  Bed Mobility/Transfers: Bed Mobility  Bed Mobility: Yes  Bed Mobility 1  Level of Assistance 1:  (PT REQUIRED HOB ELEVATED FULLY, AND MOD ASSIST OF HAND FROM THERAPIST TO GET FROM SUPINE TO SIT AT EDGE OF BED.)    Transfers  Transfer:  (SIT TO STAND X SBA)  IADL's:   Communication:     Splinting:     Therapy/Activity: Therapeutic  Activity  Therapeutic Activity Performed:  (FUNCTIONAL MOBILITY IN ROOM AND IN HALLWAY WITH IV POLE FOR SUPPORT AND CGA. PT MONITORED FOR VITALS DURING ENTIRE TREATMENT.  PT 02 RANGED FROM 92-96. PT HEART RANGED FROM 112-122 DURING THER ACTIVITY. REST PROVIDED DURING EACH TASK UNTIL PT HR LOWERED.)  Sensory:     Cognitive Skill Development:     Vision:     Strength:  Strength  Strength Comments: BLE at least 3/5  Other Activity:     Home environment:           Outcome Measures:  [facility] Daily Activity  Putting on and taking off regular lower body clothing: Total  Bathing (including washing, rinsing, drying): A lot  Putting on and taking off regular upper body clothing: A little  Toileting, which includes using toilet, bedpan or urinal: A little  Taking care of personal grooming such as brushing teeth: A little  Eating Meals: None  Daily Activity - Total Score: 16        Education Documentation  ADL Training, taught by Annie Young OT at 10/4/2023 11:00 AM.  Learner: Patient  Readiness: Acceptance  Method: Explanation, Demonstration, Handout  Response: Verbalizes Understanding  Comment: PT SHOWED DME FOR HOME ENVIRONMENT AIDES VIA MOBILE DEVICE.    Mobility Training, taught by Annie Young OT at 10/4/2023 11:00 AM.  Learner: Patient  Readiness: Acceptance  Method: Explanation, Demonstration, Handout  Response: Verbalizes Understanding  Comment: PT SHOWED DME FOR HOME ENVIRONMENT AIDES VIA MOBILE DEVICE.    Education Comments  No comments found.      Goals:  Encounter Problems       Encounter Problems (Active)       Balance       Pt will complete lb dressing with CGA assist WITH AE (Progressing)       Start:  10/02/23    Expected End:  10/16/23            Pt will demonstrate/simulate/verbalize understating of energy conservation and work simplification techniques during homemaking tasks and simple item retrieval/transport. (Progressing)       Start:  10/02/23    Expected End:  10/16/23            - Pt will  increase standing tolerance x 8 minutes to promote greater activity tolerance for completion of adl/transfers (Progressing)       Start:  10/02/23    Expected End:  10/16/23

## 2023-10-05 ENCOUNTER — APPOINTMENT (OUTPATIENT)
Dept: RADIOLOGY | Facility: HOSPITAL | Age: 43
DRG: 871 | End: 2023-10-05
Payer: COMMERCIAL

## 2023-10-05 VITALS
SYSTOLIC BLOOD PRESSURE: 128 MMHG | DIASTOLIC BLOOD PRESSURE: 60 MMHG | TEMPERATURE: 97.7 F | OXYGEN SATURATION: 94 % | WEIGHT: 315 LBS | HEART RATE: 110 BPM | RESPIRATION RATE: 17 BRPM | HEIGHT: 67 IN | BODY MASS INDEX: 49.44 KG/M2

## 2023-10-05 LAB
ALBUMIN SERPL BCP-MCNC: 2.5 G/DL (ref 3.4–5)
ALP SERPL-CCNC: 81 U/L (ref 33–120)
ALT SERPL W P-5'-P-CCNC: 7 U/L (ref 10–52)
ANION GAP SERPL CALC-SCNC: 16 MMOL/L (ref 10–20)
APPEARANCE UR: ABNORMAL
AST SERPL W P-5'-P-CCNC: 14 U/L (ref 9–39)
BACTERIA #/AREA URNS AUTO: ABNORMAL /HPF
BILIRUB SERPL-MCNC: 0.9 MG/DL (ref 0–1.2)
BILIRUB UR STRIP.AUTO-MCNC: NEGATIVE MG/DL
BUN SERPL-MCNC: 19 MG/DL (ref 6–23)
CALCIUM SERPL-MCNC: 7.8 MG/DL (ref 8.6–10.3)
CHLORIDE SERPL-SCNC: 99 MMOL/L (ref 98–107)
CO2 SERPL-SCNC: 21 MMOL/L (ref 21–32)
COLOR UR: ABNORMAL
COMPLETE PATHOLOGY REPORT: NORMAL
CONVERTED CLINICAL DIAGNOSIS-HISTORY: NORMAL
CONVERTED FINAL DIAGNOSIS: NORMAL
CONVERTED FINAL REPORT PDF LINK TO COPY AND PASTE: NORMAL
CONVERTED GROSS DESCRIPTION: NORMAL
CREAT SERPL-MCNC: 1.57 MG/DL (ref 0.5–1.3)
ERYTHROCYTE [DISTWIDTH] IN BLOOD BY AUTOMATED COUNT: 15.3 % (ref 11.5–14.5)
ERYTHROCYTE [DISTWIDTH] IN BLOOD BY AUTOMATED COUNT: 15.4 % (ref 11.5–14.5)
GFR SERPL CREATININE-BSD FRML MDRD: 56 ML/MIN/1.73M*2
GLUCOSE SERPL-MCNC: 101 MG/DL (ref 74–99)
GLUCOSE UR STRIP.AUTO-MCNC: NEGATIVE MG/DL
HCT VFR BLD AUTO: 25.9 % (ref 41–52)
HCT VFR BLD AUTO: 26.5 % (ref 41–52)
HGB BLD-MCNC: 7.8 G/DL (ref 13.5–17.5)
HGB BLD-MCNC: 8.4 G/DL (ref 13.5–17.5)
KETONES UR STRIP.AUTO-MCNC: NEGATIVE MG/DL
LEUKOCYTE ESTERASE UR QL STRIP.AUTO: NEGATIVE
MCH RBC QN AUTO: 32.1 PG (ref 26–34)
MCH RBC QN AUTO: 33.2 PG (ref 26–34)
MCHC RBC AUTO-ENTMCNC: 30.1 G/DL (ref 32–36)
MCHC RBC AUTO-ENTMCNC: 31.7 G/DL (ref 32–36)
MCV RBC AUTO: 105 FL (ref 80–100)
MCV RBC AUTO: 107 FL (ref 80–100)
NITRITE UR QL STRIP.AUTO: NEGATIVE
NRBC BLD-RTO: 0 /100 WBCS (ref 0–0)
NRBC BLD-RTO: 0 /100 WBCS (ref 0–0)
PH UR STRIP.AUTO: 5 [PH]
PLATELET # BLD AUTO: 325 X10*3/UL (ref 150–450)
PLATELET # BLD AUTO: 344 X10*3/UL (ref 150–450)
PMV BLD AUTO: 8.8 FL (ref 7.5–11.5)
PMV BLD AUTO: 8.9 FL (ref 7.5–11.5)
POTASSIUM SERPL-SCNC: 4.2 MMOL/L (ref 3.5–5.3)
PROT SERPL-MCNC: 5.1 G/DL (ref 6.4–8.2)
PROT UR STRIP.AUTO-MCNC: ABNORMAL MG/DL
RBC # BLD AUTO: 2.43 X10*6/UL (ref 4.5–5.9)
RBC # BLD AUTO: 2.53 X10*6/UL (ref 4.5–5.9)
RBC # UR STRIP.AUTO: NEGATIVE /UL
RBC #/AREA URNS AUTO: ABNORMAL /HPF
SODIUM SERPL-SCNC: 132 MMOL/L (ref 136–145)
SP GR UR STRIP.AUTO: 1.03
UROBILINOGEN UR STRIP.AUTO-MCNC: <2 MG/DL
WBC # BLD AUTO: 17.2 X10*3/UL (ref 4.4–11.3)
WBC # BLD AUTO: 17.8 X10*3/UL (ref 4.4–11.3)
WBC #/AREA URNS AUTO: ABNORMAL /HPF

## 2023-10-05 PROCEDURE — 85027 COMPLETE CBC AUTOMATED: CPT | Performed by: INTERNAL MEDICINE

## 2023-10-05 PROCEDURE — 2500000001 HC RX 250 WO HCPCS SELF ADMINISTERED DRUGS (ALT 637 FOR MEDICARE OP): Performed by: NURSE PRACTITIONER

## 2023-10-05 PROCEDURE — 94640 AIRWAY INHALATION TREATMENT: CPT

## 2023-10-05 PROCEDURE — 36415 COLL VENOUS BLD VENIPUNCTURE: CPT | Performed by: INTERNAL MEDICINE

## 2023-10-05 PROCEDURE — 99239 HOSP IP/OBS DSCHRG MGMT >30: CPT | Performed by: INTERNAL MEDICINE

## 2023-10-05 PROCEDURE — 99233 SBSQ HOSP IP/OBS HIGH 50: CPT | Performed by: INTERNAL MEDICINE

## 2023-10-05 PROCEDURE — 80053 COMPREHEN METABOLIC PANEL: CPT | Performed by: NURSE PRACTITIONER

## 2023-10-05 PROCEDURE — 2500000001 HC RX 250 WO HCPCS SELF ADMINISTERED DRUGS (ALT 637 FOR MEDICARE OP): Performed by: INTERNAL MEDICINE

## 2023-10-05 PROCEDURE — 85027 COMPLETE CBC AUTOMATED: CPT | Performed by: NURSE PRACTITIONER

## 2023-10-05 PROCEDURE — 2500000004 HC RX 250 GENERAL PHARMACY W/ HCPCS (ALT 636 FOR OP/ED): Performed by: NURSE PRACTITIONER

## 2023-10-05 PROCEDURE — 36415 COLL VENOUS BLD VENIPUNCTURE: CPT | Performed by: NURSE PRACTITIONER

## 2023-10-05 PROCEDURE — 2500000004 HC RX 250 GENERAL PHARMACY W/ HCPCS (ALT 636 FOR OP/ED): Performed by: INTERNAL MEDICINE

## 2023-10-05 PROCEDURE — 71045 X-RAY EXAM CHEST 1 VIEW: CPT | Performed by: RADIOLOGY

## 2023-10-05 PROCEDURE — 81003 URINALYSIS AUTO W/O SCOPE: CPT | Performed by: INTERNAL MEDICINE

## 2023-10-05 PROCEDURE — 71045 X-RAY EXAM CHEST 1 VIEW: CPT | Mod: FY

## 2023-10-05 RX ORDER — VANCOMYCIN HYDROCHLORIDE 125 MG/1
125 CAPSULE ORAL 4 TIMES DAILY
Start: 2023-10-05 | End: 2023-10-29 | Stop reason: HOSPADM

## 2023-10-05 RX ORDER — L. ACIDOPHILUS/L.BULGARICUS 1MM CELL
1 TABLET ORAL DAILY
Start: 2023-10-06 | End: 2024-02-27 | Stop reason: SDUPTHER

## 2023-10-05 RX ORDER — L. ACIDOPHILUS/L.BULGARICUS 1MM CELL
1 TABLET ORAL DAILY
Status: DISCONTINUED | OUTPATIENT
Start: 2023-10-05 | End: 2023-10-05 | Stop reason: HOSPADM

## 2023-10-05 RX ORDER — POLYETHYLENE GLYCOL 3350 17 G/17G
17 POWDER, FOR SOLUTION ORAL DAILY
Start: 2023-10-06 | End: 2023-11-09 | Stop reason: SDUPTHER

## 2023-10-05 RX ORDER — VANCOMYCIN HYDROCHLORIDE 125 MG/1
125 CAPSULE ORAL 4 TIMES DAILY
Status: DISCONTINUED | OUTPATIENT
Start: 2023-10-05 | End: 2023-10-05 | Stop reason: HOSPADM

## 2023-10-05 RX ORDER — FAMOTIDINE 10 MG/ML
20 INJECTION INTRAVENOUS EVERY 12 HOURS SCHEDULED
Status: DISCONTINUED | OUTPATIENT
Start: 2023-10-05 | End: 2023-10-05 | Stop reason: HOSPADM

## 2023-10-05 RX ORDER — VANCOMYCIN HYDROCHLORIDE 125 MG/1
125 CAPSULE ORAL 4 TIMES DAILY
Status: DISCONTINUED | OUTPATIENT
Start: 2023-10-05 | End: 2023-10-05

## 2023-10-05 RX ORDER — SIMETHICONE 80 MG
80 TABLET,CHEWABLE ORAL 3 TIMES DAILY PRN
Qty: 30 TABLET | Refills: 0 | Status: ON HOLD | OUTPATIENT
Start: 2023-10-05 | End: 2024-05-20 | Stop reason: ALTCHOICE

## 2023-10-05 RX ORDER — METOPROLOL SUCCINATE 50 MG/1
50 TABLET, EXTENDED RELEASE ORAL 2 TIMES DAILY
Status: DISCONTINUED | OUTPATIENT
Start: 2023-10-05 | End: 2023-10-05 | Stop reason: HOSPADM

## 2023-10-05 RX ADMIN — SIMETHICONE 80 MG: 80 TABLET, CHEWABLE ORAL at 12:17

## 2023-10-05 RX ADMIN — OXYCODONE HYDROCHLORIDE 5 MG: 5 TABLET ORAL at 14:40

## 2023-10-05 RX ADMIN — NYSTATIN: 100000 POWDER TOPICAL at 09:50

## 2023-10-05 RX ADMIN — METOPROLOL SUCCINATE 50 MG: 50 TABLET, EXTENDED RELEASE ORAL at 09:47

## 2023-10-05 RX ADMIN — FAMOTIDINE 20 MG: 10 INJECTION, SOLUTION INTRAVENOUS at 12:22

## 2023-10-05 RX ADMIN — VANCOMYCIN HYDROCHLORIDE 125 MG: 125 CAPSULE ORAL at 13:32

## 2023-10-05 RX ADMIN — Medication 1 TABLET: at 12:22

## 2023-10-05 RX ADMIN — SODIUM CHLORIDE 100 ML/HR: 9 INJECTION, SOLUTION INTRAVENOUS at 08:39

## 2023-10-05 RX ADMIN — OXYCODONE HYDROCHLORIDE 5 MG: 5 TABLET ORAL at 05:23

## 2023-10-05 RX ADMIN — HEPARIN SODIUM 7500 UNITS: 5000 INJECTION INTRAVENOUS; SUBCUTANEOUS at 12:17

## 2023-10-05 RX ADMIN — PIPERACILLIN SODIUM AND TAZOBACTAM SODIUM 4.5 G: 4; .5 INJECTION, SOLUTION INTRAVENOUS at 12:18

## 2023-10-05 RX ADMIN — PIPERACILLIN SODIUM AND TAZOBACTAM SODIUM 4.5 G: 4; .5 INJECTION, SOLUTION INTRAVENOUS at 05:23

## 2023-10-05 RX ADMIN — OXYCODONE HYDROCHLORIDE 5 MG: 5 TABLET ORAL at 00:40

## 2023-10-05 RX ADMIN — HEPARIN SODIUM 7500 UNITS: 5000 INJECTION INTRAVENOUS; SUBCUTANEOUS at 05:24

## 2023-10-05 RX ADMIN — OXYCODONE HYDROCHLORIDE 5 MG: 5 TABLET ORAL at 09:48

## 2023-10-05 RX ADMIN — FLUTICASONE FUROATE AND VILANTEROL 1 PUFF: 200; 25 POWDER RESPIRATORY (INHALATION) at 07:46

## 2023-10-05 RX ADMIN — PIPERACILLIN SODIUM AND TAZOBACTAM SODIUM 4.5 G: 4; .5 INJECTION, SOLUTION INTRAVENOUS at 00:41

## 2023-10-05 ASSESSMENT — PAIN - FUNCTIONAL ASSESSMENT
PAIN_FUNCTIONAL_ASSESSMENT: 0-10

## 2023-10-05 ASSESSMENT — COGNITIVE AND FUNCTIONAL STATUS - GENERAL
MOVING TO AND FROM BED TO CHAIR: A LOT
TOILETING: A LITTLE
HELP NEEDED FOR BATHING: A LITTLE
WALKING IN HOSPITAL ROOM: A LOT
TURNING FROM BACK TO SIDE WHILE IN FLAT BAD: A LITTLE
STANDING UP FROM CHAIR USING ARMS: A LITTLE
CLIMB 3 TO 5 STEPS WITH RAILING: A LOT
DRESSING REGULAR UPPER BODY CLOTHING: A LITTLE
MOBILITY SCORE: 16
DRESSING REGULAR LOWER BODY CLOTHING: A LITTLE
DAILY ACTIVITIY SCORE: 20

## 2023-10-05 ASSESSMENT — PAIN SCALES - GENERAL
PAINLEVEL_OUTOF10: 8
PAINLEVEL_OUTOF10: 8
PAINLEVEL_OUTOF10: 6
PAINLEVEL_OUTOF10: 2
PAINLEVEL_OUTOF10: 3
PAINLEVEL_OUTOF10: 8
PAINLEVEL_OUTOF10: 0 - NO PAIN

## 2023-10-05 ASSESSMENT — PAIN SCALES - PAIN ASSESSMENT IN ADVANCED DEMENTIA (PAINAD)
CONSOLABILITY: NO NEED TO CONSOLE
TOTALSCORE: 2
BODYLANGUAGE: TENSE, DISTRESSED PACING, FIDGETING
BREATHING: NORMAL
NEGVOCALIZATION: OCCASIONAL MOAN/GROAN, LOW SPEECH, NEGATIVE/DISAPPROVING QUALITY
FACIALEXPRESSION: SMILING OR INEXPRESSIVE

## 2023-10-05 ASSESSMENT — PAIN DESCRIPTION - DESCRIPTORS
DESCRIPTORS: ACHING

## 2023-10-05 ASSESSMENT — PAIN SCALES - WONG BAKER: WONGBAKER_NUMERICALRESPONSE: HURTS WHOLE LOT

## 2023-10-05 NOTE — PROGRESS NOTES
"Chris Chance is a 42 y.o. male on day 7 of admission presenting with Edema of extremities.    Subjective   Pt laying comfortably in bed without acute distress. Pt states that he had his dressing redone this morning. Patient denies any constitutional symptoms. No other pedal complaints.       Physical Exam    Objective      Dressings not changed today.      Last Recorded Vitals  Blood pressure 128/60, pulse 110, temperature 36.5 °C (97.7 °F), temperature source Temporal, resp. rate 17, height 1.701 m (5' 6.97\"), weight (!) 173 kg (380 lb 11.8 oz), SpO2 94 %.    Intake/Output last 3 Shifts:  I/O last 3 completed shifts:  In: 100 (0.6 mL/kg) [IV Piggyback:100]  Out: - (0 mL/kg)   Weight: 172.7 kg     Relevant Results          Lab Results   Component Value Date    SEDRATE >130 (H) 09/28/2023    CRP 9.71 (A) 09/28/2023    BLOODCULT  09/27/2023     No Growth at 1 days~No Growth at 2 days~No Growth at 3 days~NO GROWTH at 4 days - FINAL REPORT    BLOODCULT  09/27/2023     No Growth at 1 days~No Growth at 2 days~No Growth at 3 days~NO GROWTH at 4 days - FINAL REPORT    TISWDCULTSME Staphylococcus aureus (A) 09/29/2023       XR CHEST 1 VIEW    - Impression -  No focal infiltrate or pneumothorax is identified.            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 All findings were discussed with pt. All pt questions were answered to level of satisfaction.  - For nursing: Please reinforce or change as needed.    - Podiatry signed off.        This patient was discussed with the attending, Dr. Libra Smalls, LUDMILA. Please await attending's signature for finalization of this note. "         Yves Mason, DPM  Podiatry resident, PGY-2

## 2023-10-05 NOTE — PROGRESS NOTES
Physical Therapy                 Therapy Communication Note    Patient Name: Chris Chance  MRN: [id number]  Today's Date: 10/5/2023     Discipline: Physical Therapy    Missed Visit Reason:  (pt politely declined tx at this time.  awaiting possible d/c later this date.)    Missed Time: Attempt at 13:50

## 2023-10-05 NOTE — CARE PLAN
The patient's goals for the shift include no pain    The clinical goals for the shift include patient will remain free from pain or injury    Over the shift, the patient did not make progress toward the following goals. Barriers to progression include acuteness of illness. Recommendations to address these barriers include pain medication as ordered.    Problem: Pain  Goal: Takes deep breaths with improved pain control throughout the shift  Outcome: Not Progressing

## 2023-10-05 NOTE — PROGRESS NOTES
Subjective Data:  Denies any chest pain or palpitations-does have some dyspnea on exertion going up to the bathroom  Still with leg discomfort    Overnight Events:    Leg pain     Objective Data:  Telemetry with mild sinus tachycardia  Last Recorded Vitals:  Vitals:    10/04/23 2028 10/05/23 0200 10/05/23 0536 10/05/23 1002   BP: 125/75 117/58 132/61 128/60   BP Location: Left arm Left arm Left arm Left arm   Patient Position: Lying Sitting Sitting Sitting   Pulse: 106 110 107 110   Resp: 18 18 18 17   Temp: 35.6 °C (96.1 °F) 35.9 °C (96.6 °F) 36.3 °C (97.3 °F) 36.5 °C (97.7 °F)   TempSrc: Temporal Temporal Temporal Temporal   SpO2: 95% 95% 95% 94%   Weight:       Height:           Last Labs:  CBC - 10/5/2023:  7:04 AM  17.8 8.4 344    26.5      CMP - 10/5/2023:  7:04 AM  CR=1.57  7.8 5.1 14 --- 0.9   _ 2.5 7 81      PTT - No results in last year.  _   _ _     TROPHS   Date/Time Value Ref Range Status   09/27/2023 11:14 AM 4 0 - 20 ng/L Final     Comment:     .  Less than 99th percentile of normal range cutoff-  Female and children under 18 years old <14 ng/L; Male <21 ng/L: Negative  Repeat testing should be performed if clinically indicated.   .  Female and children under 18 years old 14-50 ng/L; Male 21-50 ng/L:  Consistent with possible cardiac damage and possible increased clinical   risk. Serial measurements may help to assess extent of myocardial damage.   .  >50 ng/L: Consistent with cardiac damage, increased clinical risk and  myocardial infarction. Serial measurements may help assess extent of   myocardial damage.   .   NOTE: Children less than 1 year old may have higher baseline troponin   levels and results should be interpreted in conjunction with the overall   clinical context.   .  NOTE: Troponin I testing is performed using a different   testing methodology at [facility] than at other   [facility] hospitals. Direct result comparisons should only   be made within the same method.       BNP    Date/Time Value Ref Range Status   09/27/2023 11:12 AM 7 0 - 99 pg/mL Final     Comment:     .  <100 pg/mL - Heart failure unlikely  100-299 pg/mL - Intermediate probability of acute heart  .               failure exacerbation. Correlate with clinical  .               context and patient history.    >=300 pg/mL - Heart Failure likely. Correlate with clinical  .               context and patient history.  BNP testing is performed using different testing   methodology at [facility] than at other   [facility] hospitals. Direct result comparisons should   only be made within the same method.       HGBA1C   Date/Time Value Ref Range Status   09/29/2023 08:38 AM 5.3 see below % Final     VLDL   Date/Time Value Ref Range Status   04/27/2022 09:06 AM 28 0 - 40 mg/dL Final   04/23/2021 11:49 AM 20 0 - 40 mg/dL Final      Last I/O:  I/O last 3 completed shifts:  In: 100 (0.6 mL/kg) [IV Piggyback:100]  Out: - (0 mL/kg)   Weight: 172.7 kg     Past Cardiology Tests (Last 3 Years):  EKG:  No results found for this or any previous visit from the past 1095 days.    Echo:  Transthoracic Echo (TTE) Complete 9/30/2023    Ejection Fractions:  55 to 60%  Cath:  No results found for this or any previous visit from the past 1095 days.    Stress Test:  No results found for this or any previous visit from the past 1095 days.    Cardiac Imaging:  Chest CT  1. No evidence of pulmonary emboli, dissection or aneurysm given  limitations.  2. Probable multifocal pneumonia as described.    Inpatient Medications:  Scheduled medications   Medication Dose Route Frequency    fluticasone furoate-vilanteroL  1 puff inhalation Daily    heparin (porcine)  7,500 Units subcutaneous q8h    metoprolol succinate XL  50 mg oral BID    nystatin   Topical BID    piperacillin-tazobactam  4.5 g intravenous q6h    polyethylene glycol  17 g oral Daily     PRN medications   Medication    acetaminophen    albuterol    oxyCODONE    oxyCODONE    simethicone     traMADol     Continuous Medications   Medication Dose Last Rate    sodium chloride 0.9%  100 mL/hr 100 mL/hr (10/05/23 0839)       Physical Exam:  Constitutional:       General: He is not in acute distress.     Appearance: He is obese.   Neck:      Vascular: No carotid bruit, hepatojugular reflux or JVD.   Cardiovascular:      Rate and Rhythm:  Regular rhythm with increased rate     Pulses:  decreased pulses.      Heart sounds: Normal heart sounds with increased rate.        Pulmonary:      Effort: Pulmonary effort is normal.   Abdominal:      General: Bowel sounds are normal.   Musculoskeletal:      Comments: Normal strength   Skin:     General: Skin is warm and dry.   Neurological:      General: No focal deficit present.      Mental Status: He is alert.   Extremities:    Legs wrapped/edematous  Psychiatric:         Attention and Perception: Attention normal.      Assessment/Plan   1.  Sepsis   2.  Cellulitis   3.  Anemia   4.  Sinus tachycardia   5.  Pneumonia/shortness of breath   6.HUGO   7.LE edema  - 42-year-old male with history of hypertension presented to hospital with edematous painful legs was diagnosed with cellulitis/sepsis.  Patient with persistent sinus tachycardia/dyspnea on exertion.  Echocardiogram revealed normal left ventricular systolic function.  Was concerned about the possibility of pulmonary embolism but 10/2/2023 chest CT negative for PE.  -Remains tachycardic although still has anemia and pneumonia was noted on chest CT. Titrate up beta blocker  -Legs remain edematous but hesitant to add diuretic as Cr increasing  -We will continue to monitor with you    Site Assessment Clean;Dry;Intact 10/01/23 1115   Dressing Status Clean;Dry 10/01/23 1115   Number of days:        Peripheral IV 09/29/23 20 G Left;Posterior Hand (Active)   Site Assessment Clean;Dry;Intact 10/01/23 1116   Dressing Status Clean;Dry 10/01/23 1116   Number of days: 2       Code Status:  No Order            Rudolph BECKWITH  Cori, DO

## 2023-10-05 NOTE — NURSING NOTE
Bilateral lower extremity dressings changed per daily order, significant drainage continues from marcos sites.

## 2023-10-06 ENCOUNTER — NURSING HOME VISIT (OUTPATIENT)
Dept: POST ACUTE CARE | Facility: EXTERNAL LOCATION | Age: 43
End: 2023-10-06
Payer: COMMERCIAL

## 2023-10-06 DIAGNOSIS — I10 BENIGN ESSENTIAL HYPERTENSION: ICD-10-CM

## 2023-10-06 DIAGNOSIS — J45.20 MILD INTERMITTENT ASTHMA WITHOUT COMPLICATION (HHS-HCC): Primary | ICD-10-CM

## 2023-10-06 DIAGNOSIS — E78.00 HYPERCHOLESTEROLEMIA: ICD-10-CM

## 2023-10-06 DIAGNOSIS — L03.116 CELLULITIS OF LEFT LOWER EXTREMITY: ICD-10-CM

## 2023-10-06 DIAGNOSIS — E66.01 MORBID OBESITY (MULTI): ICD-10-CM

## 2023-10-06 PROCEDURE — 99305 1ST NF CARE MODERATE MDM 35: CPT | Performed by: INTERNAL MEDICINE

## 2023-10-07 ENCOUNTER — HOSPITAL ENCOUNTER (OUTPATIENT)
Dept: CARDIOLOGY | Facility: HOSPITAL | Age: 43
Discharge: HOME | End: 2023-10-07
Payer: COMMERCIAL

## 2023-10-07 LAB
ATRIAL RATE: 118 BPM
P AXIS: 69 DEGREES
PR INTERVAL: 158 MS
Q ONSET: 249 MS
QRS COUNT: 19 BEATS
QRS DURATION: 114 MS
QT INTERVAL: 285 MS
QTC CALCULATION(BAZETT): 400 MS
QTC FREDERICIA: 357 MS
R AXIS: 31 DEGREES
T AXIS: 210 DEGREES
T OFFSET: 392 MS
VENTRICULAR RATE: 118 BPM

## 2023-10-07 PROCEDURE — 93005 ELECTROCARDIOGRAM TRACING: CPT

## 2023-10-07 PROCEDURE — 93010 ELECTROCARDIOGRAM REPORT: CPT | Performed by: INTERNAL MEDICINE

## 2023-10-10 PROBLEM — L03.90 CELLULITIS: Status: ACTIVE | Noted: 2023-10-10

## 2023-10-10 PROBLEM — A41.9 SEPSIS (MULTI): Status: ACTIVE | Noted: 2023-10-10

## 2023-10-10 NOTE — PROGRESS NOTES
HISTORY & PHYSICAL    Subjective   Chief complaint: Chris Chance is a 42 y.o. male who is a acute skilled care patient being seen and evaluated for multiple medical problems.  Patient presents for weakness.    HPI:  Patient is a 42 year old male with a past medical history of HTN, HLD, gout and MICHAEL. Patient was admitted to the SNF after being in the hospital. Patient presented to the hospital for bilateral lower extremity pain. Pt states pain was 10/10 with ambulation. Patient had BLE cellulitis. Patient was given vanco and zosyn. Patient was also given IV fluid. Patient was evaluated by PT/OT and discharged to skilled nursing facility.         Past Medical History:   Diagnosis Date    Abnormal levels of other serum enzymes     Elevated liver enzymes    Lateral epicondylitis, right elbow     Right tennis elbow    Other hypertrophic disorders of the skin     Skin tag    Personal history of other diseases of the nervous system and sense organs     History of serous otitis media    Personal history of other diseases of the respiratory system     History of bronchitis    Personal history of other specified conditions     History of abdominal pain    Plantar fascial fibromatosis     Plantar fasciitis    Unspecified asthma, uncomplicated     Asthmatic bronchitis       Past Surgical History:   Procedure Laterality Date    OTHER SURGICAL HISTORY  09/22/2015    History Of Prior Surgery       No family history on file.    Social History     Socioeconomic History    Marital status: Single     Spouse name: Not on file    Number of children: Not on file    Years of education: Not on file    Highest education level: Not on file   Occupational History    Not on file   Tobacco Use    Smoking status: Never    Smokeless tobacco: Never   Substance and Sexual Activity    Alcohol use: Never    Drug use: Never    Sexual activity: Not on file   Other Topics Concern    Not on file   Social History Narrative    Not on file     Social  Determinants of Health     Financial Resource Strain: Not on file   Food Insecurity: Not on file   Transportation Needs: Not on file   Physical Activity: Not on file   Stress: Not on file   Social Connections: Not on file   Intimate Partner Violence: Not At Risk (7/18/2023)    Humiliation, Afraid, Rape, and Kick questionnaire     Fear of Current or Ex-Partner: No     Emotionally Abused: No     Physically Abused: No     Sexually Abused: No   Housing Stability: Not on file       Vital signs: 156/68, 97.9, 106, 16, 95%    Objective   Physical Exam  Constitutional:       Appearance: He is obese.   HENT:      Head: Normocephalic and atraumatic.      Nose: Nose normal.      Mouth/Throat:      Mouth: Mucous membranes are moist.      Pharynx: Oropharynx is clear.   Eyes:      Extraocular Movements: Extraocular movements intact.      Pupils: Pupils are equal, round, and reactive to light.   Cardiovascular:      Rate and Rhythm: Normal rate and regular rhythm.   Pulmonary:      Effort: No respiratory distress.      Breath sounds: Normal breath sounds. No wheezing, rhonchi or rales.   Abdominal:      General: Bowel sounds are normal. There is no distension.      Palpations: Abdomen is soft.      Tenderness: There is no abdominal tenderness. There is no guarding.   Musculoskeletal:      Right lower leg: No edema.      Left lower leg: No edema.   Skin:     General: Skin is warm and dry.   Neurological:      Mental Status: He is alert and oriented to person, place, and time. Mental status is at baseline.   Psychiatric:         Mood and Affect: Mood normal.         Behavior: Behavior normal.         Assessment/Plan   Problem List Items Addressed This Visit       Asthma - Primary    Benign essential hypertension    Hypercholesterolemia    Morbid obesity (CMS/HCC)    Cellulitis     Medications, treatments, and labs reviewed  Continue medications and treatments as listed in [facility]    Scribe Attestation  I, Alisa Gustafson Scribe   attest  that this documentation has been prepared under the direction and in the presence of Erik Avila DO.    An interactive audio and/or video telecommunication system which permits real time communications between the patient (at the originating site) and provider (at a distant site) was utilized to provide this telehealth service after obtaining verbal consent.    Provider Attestation - Scribe documentation  All medical record entries made by the Scribe were at my direction and personally dictated by me. I have reviewed the chart and agree that the record accurately reflects my personal performance of the history, physical exam, discussion and plan.    Erik Avila DO

## 2023-10-11 ENCOUNTER — NURSING HOME VISIT (OUTPATIENT)
Dept: POST ACUTE CARE | Facility: EXTERNAL LOCATION | Age: 43
End: 2023-10-11
Payer: COMMERCIAL

## 2023-10-11 ENCOUNTER — APPOINTMENT (OUTPATIENT)
Dept: PRIMARY CARE | Facility: CLINIC | Age: 43
End: 2023-10-11
Payer: COMMERCIAL

## 2023-10-11 ENCOUNTER — CLINICAL SUPPORT (OUTPATIENT)
Dept: WOUND CARE | Facility: CLINIC | Age: 43
End: 2023-10-11
Payer: COMMERCIAL

## 2023-10-11 DIAGNOSIS — R53.1 WEAKNESS: ICD-10-CM

## 2023-10-11 DIAGNOSIS — I10 BENIGN ESSENTIAL HYPERTENSION: ICD-10-CM

## 2023-10-11 DIAGNOSIS — J45.20 MILD INTERMITTENT ASTHMA WITHOUT COMPLICATION (HHS-HCC): Primary | ICD-10-CM

## 2023-10-11 PROCEDURE — 99213 OFFICE O/P EST LOW 20 MIN: CPT

## 2023-10-11 PROCEDURE — 29581 APPL MULTLAYER CMPRN SYS LEG: CPT | Mod: 50

## 2023-10-11 PROCEDURE — 9990 CHARGE CONVERSION

## 2023-10-11 PROCEDURE — 88305 TISSUE EXAM BY PATHOLOGIST: CPT

## 2023-10-11 PROCEDURE — 99308 SBSQ NF CARE LOW MDM 20: CPT | Performed by: REGISTERED NURSE

## 2023-10-11 PROCEDURE — 0753T DGTZ GLS MCRSCP SLD LEVEL IV: CPT

## 2023-10-11 NOTE — LETTER
Patient: Chris Chance  : 1980    Encounter Date: 10/11/2023    PROGRESS NOTE    Subjective  Chief complaint: Chris Chance is a 43 y.o. male who is an acute skilled patient being seen and evaluated for weakness    HPI:  10/11/23 Patient has no new complaints or concerns today.  Continues working towards goals in therapy.  Denies constitutional symptoms.    Objective  Vital signs: 162/68, 98.1, 110.0, 92%    Physical Exam  Constitutional:       General: He is not in acute distress.  Eyes:      Extraocular Movements: Extraocular movements intact.   Pulmonary:      Effort: Pulmonary effort is normal.   Musculoskeletal:      Cervical back: Neck supple.   Neurological:      Mental Status: He is alert.   Psychiatric:         Mood and Affect: Mood normal.         Behavior: Behavior is cooperative.         Assessment/Plan  Problem List Items Addressed This Visit       Asthma - Primary     Stable monitor          Benign essential hypertension     Monitor          Weakness     Pt/ot           Medications, treatments, and labs reviewed  Continue medications and treatments as listed in [facility]    Scribe Attestation  Alisa GORDON Scribe   attest that this documentation has been prepared under the direction and in the presence of DODIE Vizcaino.    Provider Attestation - Scribe documentation  All medical record entries made by the Scribe were at my direction and personally dictated by me. I have reviewed the chart and agree that the record accurately reflects my personal performance of the history, physical exam, discussion and plan.    DODIE Vizcaino        Electronically Signed By: DODIE Vizcaino   23  8:37 AM

## 2023-10-12 NOTE — CONSULTS
Service:   Service: Podiatry     Consult:  Consult requested by (Attending Name): Erik Avila   Reason: severe ble cellulitis     History of Present Illness:   Admission Reason: b/l leg ulcers   HPI:    TYRON WOOTEN is a 42 year old Male with PMHx of HTN controlled with medication. States his current leg ulcers began months ago and have increased in size and  become more painful over time. Until the last 1-2 days, he had clear drainage. He had not noticed any thick, purulent, or discolored drainage from either wound. Also notes a wound along the left heel that does not drain but is painful.  States he has  not had wounds evaluated by any specialty. States he has not wrapped or applied anything to the wounds. He is very adamant to not have the legs wrapped during today's consultation as it will cause him severe pain. He denies a history of diabetes or heart  issues. Denies F/C/N/V/SOB.    ROS: A 10 point review of systems was conducted; negative except as per HPI.    Review Family/Social History and ROS:     Review Family/Social History and ROS:       I have reviewed the family and social history and review of systems from the History and Physical.    Social History:    Smoking Status: never smoker  (1)   Alcohol Use: denies (1)            Allergies:  ·  No Known Allergies :     Objective:     Objective Information:        T   P  R  BP   MAP  SpO2   Value  36.4  116  18  109/56   77  98%  Date/Time 9/28 14:10 9/28 14:10 9/28 10:26 9/28 14:10  9/28 14:10 9/28 14:10  Range  (36.2C - 37C )  (109 - 126 )  (18 - 26 )  (97 - 119 )/ (49 - 74 )  (68 - 77 )  (95% - 100% )  Highest temp of 37 C was recorded at 9/27 9:44        Pain reported at 9/28 10:15: 7 = Severe         Weights   9/28 6:15: Weight in kg (Weight (kg))  172.7  9/28 6:15: Weight in lbs ((lbs))  380.7  9/27 13:29: BMI (kg/m2) (BMI (kg/m2))  63.357      ---- Intake and Output  -----  Mn/Dy/Year Time  Intake   Output  Net  Sep 28, 2023 2:00  pm  0   380  -380  Sep 28, 2023 6:00 am  900   0  900    The Intake and Output Totals for the last 24 hours are:      Intake   Output  Net      900   null  null         Intake                                   Output      IV Fluids              900 mL    Physical Exam Narrative:  ·  Physical Exam:    General: Well-appearing. Distressed during dressing change as he kept anticipating pain.    Vascular: DP/PT pulses faintly palpable b/l; difficult to assess due to severe pain and edema. CFT < 3 seconds to hallux b/l. No edema. Skin temperature is warm to warm from proximal to distal b/l.     Neuro: Sensation to light touch intact b/l.     MSK: Difficulty moving overall LE b/l due to severe pain. Active ROM of pedal digits b/l and ankles b/l intact.     Derm:     Right leg:   Large venous ulcer spanning the anterior lower leg with overlying sloughed skin.  Large amount of thick, yellow-green drainage trapped underneath slough.   Mild odor. Severe pain with light touch and dressing change. No bleeding. No crepitation or fluctuance  Severe pain with light touch and dressing change.    Left leg:   Large circumferential venous ulcer spanning entire lower leg.   Large amount of thick, yellow-green drainage trapped underneath slough along anterior leg. This type of drainage was not noted along the posterior aspect of the wound as there was no slough in this area of the wound, as patient has been moving in bed  and slough has been tearing off.   Posterior aspect of the wound is mixed granular fibrotic.   Mild odor. Severe pain (more than left leg) with light touch and dressing change. Severe pain with light touch and dressing change. No crepitation or fluctuance.    Left foot:   Medial aspect of heel with mixed granular fibrotic wound to subcutaneous tissue. No drainage. No odor. No bleeding. No fluctuance or crepitation.    Toenails 1-5 b/l thick and elongated. Webspaces 1-4 clean, dry, intact.     Resp: Unlabored breathing.      Psych: Normal mood and affect. Communicating normally.      Recent Lab Results:    Results:        I have reviewed these laboratory results:    Vancomycin Level, Random  28-Sep-2023 11:04:00      Result Value    Vancomycin Level, Random  15.1      Lactate, Level  Trending View      Result 28-Sep-2023 11:04:00  28-Sep-2023 08:23:00  27-Sep-2023 18:26:00  27-Sep-2023 13:27:00  27-Sep-2023 11:13:00    Lactate, Level 2.5   H   3.7   H   3.7   H   4.0   H   4.0       Lab Comment:         LA[facility]   Called- RB to SAMANTHA NOLAN, 09/27/2023 12:25        Drug Screen, Urine  28-Sep-2023 10:07:00      Result Value    Comments.  SEE BELOW Drug screen results are presumptive and should not be used to assess compliance with prescribed medication. Contact the performing [facility] laboratory  to add-on definitive confirmatory testing if clinically indicated.  .Toxicology scre    Amphetamine Screen, Urine  PRESUMPTIVE NEGATIVE CUTOFF LEVEL:  500 NG/ML Cross-reactivity has been reported with high concentrations of the following drugs: buproprion, chloroquine, chlorpromazine,  ephedrine, mephentermine, fenfluramine, phentermine, phenylpropanolamine    Barbiturate Screen, Urine  PRESUMPTIVE NEGATIVE PRESUMPTIVE NEGATIVE  CUTOFF LEVEL: 200 NG/ML    Benzodiazepine Screen, Urine  PRESUMPTIVE NEGATIVE PRESUMPTIVE NEGATIVE  CUTOFF LEVEL: 200 NG/ML    Cannabinoid Screen, Urine  PRESUMPTIVE NEGATIVE PRESUMPTIVE NEGATIVE  CUTOFF LEVEL: 50 NG/ML    Cocaine Metabolite Screen, Urine  PRESUMPTIVE NEGATIVE PRESUMPTIVE NEGATIVE  CUTOFF LEVEL: 150 NG/ML    Fentanyl Screen, Urine  PRESUMPTIVE NEGATIVE PRESUMPTIVE NEGATIVE   CUTOFF LEVEL:  5 NG/ML    Opiate Screen, Urine  PRESUMPTIVE POSITIVE CUTOFF LEVEL: 300 NG/ML The opiate screen does not detect fentanyl, meperidine, or  tramadol. Oxycodone is not consistently detected  (refer to Oxycodone Screen, Urine result).   A   Oxycodone Screen, Urine (item)  PRESUMPTIVE NEGATIVE CUTOFF LEVEL: 100 NG/ML  This  test will accurately detect both oxycodone and oxymorphone.    PCP Screen, Urine  PRESUMPTIVE NEGATIVE CUTOFF LEVEL:  25 NG/ML Cross-reactivity has been reported with dextromethorphan.      Urinalysis  28-Sep-2023 10:07:00      Result Value    Color, Urine  YELLOW  Reference Range: STRAW,YELLOW    Appearance, Urine  HAZY    Specific Gravity, Urine  1.020    pH, Urine  5.0    Protein, Urine  NEGATIVE    Glucose, Urine  NEGATIVE    Blood, Urine  NEGATIVE    Ketones, Urine  NEGATIVE    Bilirubin, Urine  NEGATIVE    Urobilinogen, Urine  <2.0    Nitrite, Urine  NEGATIVE    Leukocyte Esterase, Urine  TRACE   A     Urinalysis, Microscopic  28-Sep-2023 10:07:00      Result Value    White Cells  4    Red Blood Cells  <1    Epithelial Cells, Squamous  <1    Bacteria, Urine  1+   A     Iron + TIBC, Serum  28-Sep-2023 08:23:00      Result Value    Iron, Serum  75    Total Iron Binding Capacity  135   L   % Saturation  56   H     Folate, Serum  28-Sep-2023 08:23:00      Result Value    Folate, Serum  4.9   A     Ferritin, Serum  28-Sep-2023 08:23:00      Result Value    Ferritin, Serum  954   H     Complete Blood Count  28-Sep-2023 06:51:00      Result Value    White Blood Cell Count  7.5    Nucleated Erythrocyte Count  0.0    Red Blood Cell Count  2.11   L   HGB  7.2   L   HCT  22.7   L   MCV  108   H   MCHC  31.7   L   PLT  306    RDW-CV  13.8      Comprehensive Metabolic Panel  Trending View      Result 28-Sep-2023 06:51:00  27-Sep-2023 11:12:00    Glucose, Serum 96   91       L   131   L    K 4.8   4.9    CL 94   L   92   L    Bicarbonate, Serum 25   26    Anion Gap, Serum 16   18    BUN 20   14    CREAT 1.63   H   1.14    GFR Male 53   A   82    Calcium, Serum 8.1   L   7.5   L    ALB 2.5   L   2.7   L    ALKP 119   126   H    T Pro 5.1   L   5.6   L    T Bili 1.1   0.8    Alanine Aminotransferase, Serum 13   15    Aspartate Transaminase, Serum 26   31        Magnesium, Serum  Trending View      Result 28-Sep-2023  06:51:00  27-Sep-2023 11:12:00    Magnesium, Serum 1.63   1.16   L        Troponin I, High Sensitivity  27-Sep-2023 11:14:00      Result Value    Troponin I, High Sensitivity  4      Culture, Blood  27-Sep-2023 11:13:00      Result Value    Culture, Blood  NEGATIVE TO DATE, CULTURE IN PROGRESS.      Complete Blood Count + Differential  27-Sep-2023 11:12:00      Result Value    White Blood Cell Count  12.4   H   Nucleated Erythrocyte Count  0.0    Red Blood Cell Count  2.43   L   HGB  8.3   L   HCT  25.8   L   MCV  106   H   MCHC  32.2    PLT  373    RDW-CV  13.7    Neutrophil %  78.9    Immature Granulocytes %  1.2   H   Lymphocyte %  9.8    Monocyte %  7.4    Eosinophil %  2.1    Basophil %  0.6    Neutrophil Count  9.78   H   Lymphocyte Count  1.21    Monocyte Count  0.91    Eosinophil Count  0.26    Basophil Count  0.07      Brain Natriuretic Peptide  27-Sep-2023 11:12:00      Result Value    Brain Natriuretic Peptide  7        Radiology Results:    Results:        Conclusion:  Preliminary Cardiology Report    [place] [id number]  Tel [phone] and Fax [phone]      Preliminary Vascular Lab Report    PV[facility]      Patient Name:     TYRONSHARON WOOTEN Reading Physician:   [id number] Jenny Alegre MD, RPVI  Study Date:       9/28/2023         Referring Physician: TATO SILVER  MRN/PID:          [id number]          PCP:  Accession/Order#: [id number]         CC Report to:  YOB: 1980         Technologist:        Freida Lacey RVT  Gender:           M                 Technologist 2:  Admission Status: Inpatient         Location Performed:  [facility]      Diagnosis/ICD: I70.248-Atherosclerosis of native arteries of left leg with  ulceration of other part of lower left leg;  I70.238-Atherosclerosis of native arteries of right leg with  ulceration of other part of lower right leg  Procedure/CPT: 85228 Peripheral artery [facility] Only-[id number]      PRELIMINARY  CONCLUSIONS:  Right Lower PVR: No evidence of arterial occlusive disease in the right lower extremity at rest. Normal digital perfusion noted. Triphasic flow is noted in the right dorsalis pedis artery. Unable to insonate PT due to wounds and dressings.  Left Lower PVR: No evidence of arterial occlusive disease in the left lower extremity at rest. Normal digital perfusion noted. Triphasic flow is noted in the left dorsalis pedis artery. Unable to insonate PT due to wounds and dressings.    Imaging & Doppler Findings:    RIGHT Lower PVR Pressures Ratios  Right Dorsalis Pedis (Ankle) 140 mmHg  1.19  Right Digit (Great Toe)      101 mmHg  0.86        LEFT Lower PVR              Pressures Ratios  Left Dorsalis Pedis (Ankle) 149 mmHg  1.26  Left Digit (Great Toe)      96 mmHg   0.81        Right     Left  Brachial Pressure 118 mmHg 118 mmHg      VASCULAR PRELIMINARY REPORT  completed by Freida Lacey RVELHAM on 9/28/2023 at 4:44:10 PM    *** Preliminary ***     VASC LAB PVR [facility] only [Sep 28 2023  4:44PM]        Conclusion:  CONCLUSIONS:  Right Lower Venous: No evidence of acute deep vein thrombus visualized in the right lower extremity. Cannot rule out thrombus in non-visualized peroneal and posterior tibial veins due to open wounds.  Left Lower Venous: No evidence of acute deep vein thrombus visualized in the left lower extremity. Cannot rule out thrombus in non-visualized peroneal and posterior tibial veins due to open wounds.    Imaging & Doppler Findings:    Right                 Compressible Thrombus     Flow  Distal External Iliac     Yes        None   Spontaneous/Phasic  CFV                       Yes        None   Spontaneous/Phasic  PFV                       Yes        None  FV Proximal               Yes        None   Spontaneous/Phasic  FV Mid                    Yes        None  FV Distal                 Yes        None  Popliteal                 Yes        None   Spontaneous/Phasic      Left                   Compress Thrombus        Flow  Distal External Iliac   Yes      None   Spontaneous/Phasic  CFV                     Yes      None   Spontaneous/Phasic  PFV                     Yes      None  FV Proximal             Yes      None   Spontaneous/Phasic  FV Mid                  Yes      None  FV Distal               Yes      None  Popliteal               Yes      None   Spontaneous/Phasic      [id number] Nancy Parish MD  Electronically signed by [id number] Nancy Parish MD on 9/27/2023 at 3:00:23 PM    *** Final ***     [facility] LAB Venous Duplex Ultrasound for DVT [Sep 27 2023  3:00PM]      Impression:    No focal infiltrate or pneumothorax is identified.     MACRO: None.     Xray Chest 1 View [Sep 27 2023 11:20AM]        Assessment:    Assessment:  Patient seen and evaluated at bedside. Had lengthy discussion regarding pathophysiology of leg edema, venous ulcerations, the importance of having legs cleansed and wrapped to control infection, edema, and prevent further wounds. Patient declined having  legs wrapped several times at first as he was very nervous about the pain it would cause. After full discussion, patient was agreeable to have legs wrapped.    - WBC 7.5 (downtrending)   Lactate 2.5 (downtrending)  - PVRs b/l were unremarkable; no evidence of arterial occlusive disease.  - Venous duplex negative for LE DVT b/l.   - Blood culture negative to date.    Plan:   - Plan for OR excisional debridement tomorrow, Friday 9/29/23, with Dr. Serina DPM.  - NPO at midnight.  - Dressings today: Cleansed with Vashe, Adaptic, calcium alginate, 4x4, abd x 2, kerlix, abd x 2, ACE.  - Nursing may reinforce dressings as needed in same manner listed above.  - Podiatry will continue following.      Pat Garg DPM, PGY1  Podiatric Medicine and Surgery    A total of 80 minutes were spent coordinating care for this patient. This time was spent doing a combination of tasks such as reviewing records including imaging, labs, previous  "medical records, as well as discussing the patient's diagnoses and different  treatment options. We discussed risks and benefits of the treatment options to the patient satisfaction, questions were answered. Time was also spent charting for this patient        Consult Status:  Consult Status    (select all that apply): initial  consult complete, will follow   Consult Order ID: [id number]     Attestation:   Note Completion:  I am a:  Resident/Fellow   Attending Attestation I saw and evaluated the patient.  I personally obtained the key and critical portions of the history and physical exam or was physically present for key and  critical portions performed by the resident/fellow. I reviewed the resident/fellow?s documentation and discussed the patient with the resident/fellow.  I agree with the resident/fellow?s medical decision making as documented in the note.     I personally evaluated the patient on 28-Sep-2023         Electronic Signatures:  Libra Smalls (DP)  (Signed 28-Sep-2023 19:18)   Authored: Assessment/Recommendations, Note Completion   Co-Signer: Service, History of Present Illness, Review Family/Social History and ROS, Allergies, Objective, Assessment/Recommendations,  Note Completion  Pat Garg (ABY (Resident))  (Signed 28-Sep-2023 18:15)   Authored: Service, History of Present Illness, Review  Family/Social History and ROS, Allergies, Objective, Assessment/Recommendations, Note Completion      Last Updated: 28-Sep-2023 19:18 by Libra Smalls ([facility])    References:  1.  Data Referenced From \"History and Physical\" 27-Sep-2023 19:40   "

## 2023-10-16 ENCOUNTER — NURSING HOME VISIT (OUTPATIENT)
Dept: POST ACUTE CARE | Facility: EXTERNAL LOCATION | Age: 43
End: 2023-10-16
Payer: COMMERCIAL

## 2023-10-16 DIAGNOSIS — R53.1 WEAKNESS: ICD-10-CM

## 2023-10-16 DIAGNOSIS — M79.672 PAIN IN BOTH FEET: ICD-10-CM

## 2023-10-16 DIAGNOSIS — M79.671 PAIN IN BOTH FEET: ICD-10-CM

## 2023-10-16 DIAGNOSIS — U07.1 COVID-19: Primary | ICD-10-CM

## 2023-10-16 PROCEDURE — 99309 SBSQ NF CARE MODERATE MDM 30: CPT | Performed by: REGISTERED NURSE

## 2023-10-16 NOTE — LETTER
Patient: Chris Chance  : 1980    Encounter Date: 10/16/2023    PROGRESS NOTE    Subjective  Chief complaint: Chris Chance is a 43 y.o. male who is an acute skilled patient being seen and evaluated for weakness    HPI:  10/16/23 Patient has no new complaints or concerns today.  Pt is COVID-19 positive. Continues working towards goals in therapy.  Denies constitutional symptoms.      Objective  Vital signs: 146/80, 98, 88, 94%    Physical Exam  Constitutional:       General: He is not in acute distress.  Eyes:      Extraocular Movements: Extraocular movements intact.   Pulmonary:      Effort: Pulmonary effort is normal.   Musculoskeletal:      Cervical back: Neck supple.   Neurological:      Mental Status: He is alert.   Psychiatric:         Mood and Affect: Mood normal.         Behavior: Behavior is cooperative.         Assessment/Plan  Problem List Items Addressed This Visit       Weakness     Pt/ot          Pain in both feet     Pain stable monitor          COVID-19 - Primary     Continue isolation symptom mgmt          Medications, treatments, and labs reviewed  Continue medications and treatments as listed in [facility]    Scribe Attestation  IAlisa Scribe   attest that this documentation has been prepared under the direction and in the presence of DODIE Vizcaino.    Provider Attestation - Scribe documentation  All medical record entries made by the Scribe were at my direction and personally dictated by me. I have reviewed the chart and agree that the record accurately reflects my personal performance of the history, physical exam, discussion and plan.    DODIE Vizcaino        Electronically Signed By: DODIE Vizcaino   23  9:25 AM

## 2023-10-18 ENCOUNTER — NURSING HOME VISIT (OUTPATIENT)
Dept: POST ACUTE CARE | Facility: EXTERNAL LOCATION | Age: 43
End: 2023-10-18
Payer: COMMERCIAL

## 2023-10-18 DIAGNOSIS — R53.1 WEAKNESS: ICD-10-CM

## 2023-10-18 DIAGNOSIS — I10 BENIGN ESSENTIAL HYPERTENSION: Primary | ICD-10-CM

## 2023-10-18 PROCEDURE — 99308 SBSQ NF CARE LOW MDM 20: CPT | Performed by: REGISTERED NURSE

## 2023-10-18 NOTE — LETTER
Patient: Chris Chance  : 1980    Encounter Date: 10/18/2023    PROGRESS NOTE    Subjective  Chief complaint: Chris Chance is a 43 y.o. male who is an acute skilled patient being seen and evaluated for weakness    HPI:  10/6/23 Patient is a 42 year old male with a past medical history of HTN, HLD, gout and MICHAEL. Patient was admitted to the SNF after being in the hospital. Patient presented to the hospital for bilateral lower extremity pain. Pt states pain was 10/10 with ambulation. Patient had BLE cellulitis. Patient was given vanco and zosyn. Patient was also given IV fluid. Patient was evaluated by PT/OT and discharged to skilled nursing facility.     10/18/23 Patient admitted to SNF for therapy d/t weakness after recent hospitalization.   Patient requires assist with ADLs and transfers.  No new complaints.        Objective  Vital signs: 147/80, 98, 90, 95%    Physical Exam  Constitutional:       General: He is not in acute distress.  Eyes:      Extraocular Movements: Extraocular movements intact.   Pulmonary:      Effort: Pulmonary effort is normal.   Musculoskeletal:      Cervical back: Neck supple.   Neurological:      Mental Status: He is alert.   Psychiatric:         Mood and Affect: Mood normal.         Behavior: Behavior is cooperative.         Assessment/Plan  Problem List Items Addressed This Visit       Benign essential hypertension - Primary     Monitor          Weakness     Pt/ot           Medications, treatments, and labs reviewed  Continue medications and treatments as listed in [facility]    Scribe Attestation  Alisa GORDON Scribe   attest that this documentation has been prepared under the direction and in the presence of DODIE Vizcaino.    Provider Attestation - Scribe documentation  All medical record entries made by the Scribe were at my direction and personally dictated by me. I have reviewed the chart and agree that the record accurately reflects my personal  performance of the history, physical exam, discussion and plan.    DODIE Vizcaino        Electronically Signed By: DODIE Vizcaino   11/13/23 11:05 PM

## 2023-10-19 ENCOUNTER — HOSPITAL ENCOUNTER (INPATIENT)
Facility: HOSPITAL | Age: 43
LOS: 9 days | Discharge: NURSING, REHAB OR LONG-TERM CARE | DRG: 603 | End: 2023-10-29
Attending: INTERNAL MEDICINE | Admitting: NURSE PRACTITIONER
Payer: COMMERCIAL

## 2023-10-19 DIAGNOSIS — M54.50 CHRONIC LOW BACK PAIN, UNSPECIFIED BACK PAIN LATERALITY, UNSPECIFIED WHETHER SCIATICA PRESENT: ICD-10-CM

## 2023-10-19 DIAGNOSIS — G89.29 CHRONIC LOW BACK PAIN, UNSPECIFIED BACK PAIN LATERALITY, UNSPECIFIED WHETHER SCIATICA PRESENT: ICD-10-CM

## 2023-10-19 DIAGNOSIS — R26.2 UNABLE TO AMBULATE: Primary | ICD-10-CM

## 2023-10-19 DIAGNOSIS — E66.9 OBESITY, UNSPECIFIED CLASSIFICATION, UNSPECIFIED OBESITY TYPE, UNSPECIFIED WHETHER SERIOUS COMORBIDITY PRESENT: ICD-10-CM

## 2023-10-19 DIAGNOSIS — D72.829 LEUKOCYTOSIS, UNSPECIFIED TYPE: ICD-10-CM

## 2023-10-19 DIAGNOSIS — I87.303 STASIS EDEMA OF BOTH LOWER EXTREMITIES: ICD-10-CM

## 2023-10-19 DIAGNOSIS — L03.90 CELLULITIS, UNSPECIFIED: ICD-10-CM

## 2023-10-19 DIAGNOSIS — R53.1 GENERALIZED WEAKNESS: ICD-10-CM

## 2023-10-19 DIAGNOSIS — L03.116 CELLULITIS OF LEFT LOWER EXTREMITY: ICD-10-CM

## 2023-10-19 PROBLEM — S81.809S: Status: ACTIVE | Noted: 2023-10-19

## 2023-10-19 LAB
ABO GROUP (TYPE) IN BLOOD: NORMAL
ALBUMIN SERPL BCP-MCNC: 2.9 G/DL (ref 3.4–5)
ALP SERPL-CCNC: 98 U/L (ref 33–120)
ALT SERPL W P-5'-P-CCNC: 28 U/L (ref 10–52)
ANION GAP SERPL CALC-SCNC: 13 MMOL/L (ref 10–20)
ANTIBODY SCREEN: NORMAL
APPEARANCE UR: CLEAR
AST SERPL W P-5'-P-CCNC: 56 U/L (ref 9–39)
BACTERIA #/AREA URNS AUTO: ABNORMAL /HPF
BASOPHILS # BLD MANUAL: 0 X10*3/UL (ref 0–0.1)
BASOPHILS NFR BLD MANUAL: 0 %
BILIRUB SERPL-MCNC: 0.5 MG/DL (ref 0–1.2)
BILIRUB UR STRIP.AUTO-MCNC: NEGATIVE MG/DL
BUN SERPL-MCNC: 18 MG/DL (ref 6–23)
CALCIUM SERPL-MCNC: 9.8 MG/DL (ref 8.6–10.3)
CHLORIDE SERPL-SCNC: 99 MMOL/L (ref 98–107)
CO2 SERPL-SCNC: 31 MMOL/L (ref 21–32)
COLOR UR: YELLOW
CREAT SERPL-MCNC: 1.13 MG/DL (ref 0.5–1.3)
CRP SERPL-MCNC: 1.66 MG/DL
DACRYOCYTES BLD QL SMEAR: ABNORMAL
EOSINOPHIL # BLD MANUAL: 0.14 X10*3/UL (ref 0–0.7)
EOSINOPHIL NFR BLD MANUAL: 1 %
ERYTHROCYTE [DISTWIDTH] IN BLOOD BY AUTOMATED COUNT: 15.9 % (ref 11.5–14.5)
ERYTHROCYTE [SEDIMENTATION RATE] IN BLOOD BY WESTERGREN METHOD: 44 MM/H (ref 0–15)
GFR SERPL CREATININE-BSD FRML MDRD: 83 ML/MIN/1.73M*2
GLUCOSE SERPL-MCNC: 105 MG/DL (ref 74–99)
GLUCOSE UR STRIP.AUTO-MCNC: NEGATIVE MG/DL
HCT VFR BLD AUTO: 30.7 % (ref 41–52)
HGB BLD-MCNC: 9 G/DL (ref 13.5–17.5)
IMM GRANULOCYTES # BLD AUTO: 1.26 X10*3/UL (ref 0–0.7)
IMM GRANULOCYTES NFR BLD AUTO: 9 % (ref 0–0.9)
KETONES UR STRIP.AUTO-MCNC: NEGATIVE MG/DL
LEUKOCYTE ESTERASE UR QL STRIP.AUTO: ABNORMAL
LYMPHOCYTES # BLD MANUAL: 1.13 X10*3/UL (ref 1.2–4.8)
LYMPHOCYTES NFR BLD MANUAL: 8 %
MAGNESIUM SERPL-MCNC: 1.33 MG/DL (ref 1.6–2.4)
MCH RBC QN AUTO: 31.9 PG (ref 26–34)
MCHC RBC AUTO-ENTMCNC: 29.3 G/DL (ref 32–36)
MCV RBC AUTO: 109 FL (ref 80–100)
METAMYELOCYTES # BLD MANUAL: 0.85 X10*3/UL
METAMYELOCYTES NFR BLD MANUAL: 6 %
MONOCYTES # BLD MANUAL: 0.56 X10*3/UL (ref 0.1–1)
MONOCYTES NFR BLD MANUAL: 4 %
NEUTROPHILS # BLD MANUAL: 11.42 X10*3/UL (ref 1.2–7.7)
NEUTS BAND # BLD MANUAL: 0.14 X10*3/UL (ref 0–0.7)
NEUTS BAND NFR BLD MANUAL: 1 %
NEUTS SEG # BLD MANUAL: 11.28 X10*3/UL (ref 1.2–7)
NEUTS SEG NFR BLD MANUAL: 80 %
NITRITE UR QL STRIP.AUTO: NEGATIVE
NRBC BLD-RTO: 0.4 /100 WBCS (ref 0–0)
PH UR STRIP.AUTO: 8 [PH]
PLATELET # BLD AUTO: 287 X10*3/UL (ref 150–450)
PMV BLD AUTO: 8.6 FL (ref 7.5–11.5)
POLYCHROMASIA BLD QL SMEAR: ABNORMAL
POTASSIUM SERPL-SCNC: 4.8 MMOL/L (ref 3.5–5.3)
PROT SERPL-MCNC: 5.9 G/DL (ref 6.4–8.2)
PROT UR STRIP.AUTO-MCNC: NEGATIVE MG/DL
RBC # BLD AUTO: 2.82 X10*6/UL (ref 4.5–5.9)
RBC # UR STRIP.AUTO: NEGATIVE /UL
RBC #/AREA URNS AUTO: ABNORMAL /HPF
RBC MORPH BLD: ABNORMAL
RH FACTOR (ANTIGEN D): NORMAL
SODIUM SERPL-SCNC: 138 MMOL/L (ref 136–145)
SP GR UR STRIP.AUTO: 1.01
TOTAL CELLS COUNTED BLD: 100
UROBILINOGEN UR STRIP.AUTO-MCNC: <2 MG/DL
WBC # BLD AUTO: 14.1 X10*3/UL (ref 4.4–11.3)
WBC #/AREA URNS AUTO: ABNORMAL /HPF

## 2023-10-19 PROCEDURE — 80053 COMPREHEN METABOLIC PANEL: CPT | Performed by: INTERNAL MEDICINE

## 2023-10-19 PROCEDURE — 85007 BL SMEAR W/DIFF WBC COUNT: CPT | Performed by: INTERNAL MEDICINE

## 2023-10-19 PROCEDURE — G0378 HOSPITAL OBSERVATION PER HR: HCPCS

## 2023-10-19 PROCEDURE — 96372 THER/PROPH/DIAG INJ SC/IM: CPT | Performed by: PHYSICIAN ASSISTANT

## 2023-10-19 PROCEDURE — 2500000001 HC RX 250 WO HCPCS SELF ADMINISTERED DRUGS (ALT 637 FOR MEDICARE OP): Performed by: INTERNAL MEDICINE

## 2023-10-19 PROCEDURE — 86900 BLOOD TYPING SEROLOGIC ABO: CPT | Performed by: INTERNAL MEDICINE

## 2023-10-19 PROCEDURE — 87075 CULTR BACTERIA EXCEPT BLOOD: CPT | Mod: CMCLAB,PARLAB | Performed by: INTERNAL MEDICINE

## 2023-10-19 PROCEDURE — 36415 COLL VENOUS BLD VENIPUNCTURE: CPT | Performed by: INTERNAL MEDICINE

## 2023-10-19 PROCEDURE — 83735 ASSAY OF MAGNESIUM: CPT | Performed by: INTERNAL MEDICINE

## 2023-10-19 PROCEDURE — 2500000004 HC RX 250 GENERAL PHARMACY W/ HCPCS (ALT 636 FOR OP/ED): Performed by: PHYSICIAN ASSISTANT

## 2023-10-19 PROCEDURE — 2500000001 HC RX 250 WO HCPCS SELF ADMINISTERED DRUGS (ALT 637 FOR MEDICARE OP): Performed by: PHYSICIAN ASSISTANT

## 2023-10-19 PROCEDURE — 86140 C-REACTIVE PROTEIN: CPT | Performed by: PHYSICIAN ASSISTANT

## 2023-10-19 PROCEDURE — 99222 1ST HOSP IP/OBS MODERATE 55: CPT | Performed by: INTERNAL MEDICINE

## 2023-10-19 PROCEDURE — 85652 RBC SED RATE AUTOMATED: CPT | Performed by: PHYSICIAN ASSISTANT

## 2023-10-19 PROCEDURE — 99285 EMERGENCY DEPT VISIT HI MDM: CPT | Performed by: INTERNAL MEDICINE

## 2023-10-19 PROCEDURE — 85027 COMPLETE CBC AUTOMATED: CPT | Performed by: INTERNAL MEDICINE

## 2023-10-19 PROCEDURE — 81001 URINALYSIS AUTO W/SCOPE: CPT | Performed by: PHYSICIAN ASSISTANT

## 2023-10-19 RX ORDER — OXYCODONE HYDROCHLORIDE 5 MG/1
5 TABLET ORAL EVERY 4 HOURS PRN
Status: DISCONTINUED | OUTPATIENT
Start: 2023-10-19 | End: 2023-10-29 | Stop reason: HOSPADM

## 2023-10-19 RX ORDER — DOCUSATE SODIUM 100 MG/1
100 CAPSULE, LIQUID FILLED ORAL 2 TIMES DAILY PRN
Status: DISCONTINUED | OUTPATIENT
Start: 2023-10-19 | End: 2023-10-29 | Stop reason: HOSPADM

## 2023-10-19 RX ORDER — OXYCODONE AND ACETAMINOPHEN 5; 325 MG/1; MG/1
1 TABLET ORAL ONCE
Status: COMPLETED | OUTPATIENT
Start: 2023-10-19 | End: 2023-10-19

## 2023-10-19 RX ORDER — POLYETHYLENE GLYCOL 3350 17 G/17G
17 POWDER, FOR SOLUTION ORAL DAILY
Status: DISCONTINUED | OUTPATIENT
Start: 2023-10-19 | End: 2023-10-29 | Stop reason: HOSPADM

## 2023-10-19 RX ORDER — ALBUTEROL SULFATE 0.83 MG/ML
2.5 SOLUTION RESPIRATORY (INHALATION) EVERY 4 HOURS PRN
Status: DISCONTINUED | OUTPATIENT
Start: 2023-10-19 | End: 2023-10-22

## 2023-10-19 RX ORDER — FLUTICASONE FUROATE AND VILANTEROL 200; 25 UG/1; UG/1
1 POWDER RESPIRATORY (INHALATION)
Status: DISCONTINUED | OUTPATIENT
Start: 2023-10-19 | End: 2023-10-29 | Stop reason: HOSPADM

## 2023-10-19 RX ORDER — SULFAMETHOXAZOLE AND TRIMETHOPRIM 800; 160 MG/1; MG/1
1 TABLET ORAL 2 TIMES DAILY
Status: ON HOLD | COMMUNITY
Start: 2023-10-18 | End: 2023-10-25 | Stop reason: SDUPTHER

## 2023-10-19 RX ORDER — DOCUSATE SODIUM 100 MG/1
100 CAPSULE, LIQUID FILLED ORAL 2 TIMES DAILY PRN
COMMUNITY
End: 2023-11-09 | Stop reason: SDUPTHER

## 2023-10-19 RX ORDER — SULFAMETHOXAZOLE AND TRIMETHOPRIM 800; 160 MG/1; MG/1
1 TABLET ORAL 2 TIMES DAILY
Status: DISCONTINUED | OUTPATIENT
Start: 2023-10-19 | End: 2023-10-29 | Stop reason: HOSPADM

## 2023-10-19 RX ORDER — BUDESONIDE AND FORMOTEROL FUMARATE DIHYDRATE 160; 4.5 UG/1; UG/1
2 AEROSOL RESPIRATORY (INHALATION) 2 TIMES DAILY
COMMUNITY
Start: 2023-10-06

## 2023-10-19 RX ORDER — ACETAMINOPHEN 325 MG/1
650 TABLET ORAL EVERY 4 HOURS PRN
Status: DISCONTINUED | OUTPATIENT
Start: 2023-10-19 | End: 2023-10-29 | Stop reason: HOSPADM

## 2023-10-19 RX ORDER — L. ACIDOPHILUS/L.BULGARICUS 1MM CELL
1 TABLET ORAL DAILY
Status: DISCONTINUED | OUTPATIENT
Start: 2023-10-19 | End: 2023-10-29 | Stop reason: HOSPADM

## 2023-10-19 RX ORDER — ENOXAPARIN SODIUM 100 MG/ML
60 INJECTION SUBCUTANEOUS EVERY 12 HOURS SCHEDULED
Status: DISCONTINUED | OUTPATIENT
Start: 2023-10-19 | End: 2023-10-29 | Stop reason: HOSPADM

## 2023-10-19 RX ORDER — MAGNESIUM SULFATE HEPTAHYDRATE 40 MG/ML
2 INJECTION, SOLUTION INTRAVENOUS ONCE
Status: COMPLETED | OUTPATIENT
Start: 2023-10-19 | End: 2023-10-19

## 2023-10-19 RX ORDER — NYSTATIN 100000 [USP'U]/G
POWDER TOPICAL 2 TIMES DAILY
Status: DISCONTINUED | OUTPATIENT
Start: 2023-10-19 | End: 2023-10-29 | Stop reason: HOSPADM

## 2023-10-19 RX ORDER — METOPROLOL SUCCINATE 50 MG/1
50 TABLET, EXTENDED RELEASE ORAL EVERY EVENING
Status: DISCONTINUED | OUTPATIENT
Start: 2023-10-19 | End: 2023-10-29 | Stop reason: HOSPADM

## 2023-10-19 RX ORDER — SIMETHICONE 80 MG
80 TABLET,CHEWABLE ORAL 3 TIMES DAILY PRN
Status: DISCONTINUED | OUTPATIENT
Start: 2023-10-19 | End: 2023-10-29 | Stop reason: HOSPADM

## 2023-10-19 RX ADMIN — OXYCODONE HYDROCHLORIDE AND ACETAMINOPHEN 1 TABLET: 5; 325 TABLET ORAL at 12:11

## 2023-10-19 RX ADMIN — OXYCODONE HYDROCHLORIDE 5 MG: 5 TABLET ORAL at 19:22

## 2023-10-19 RX ADMIN — POLYETHYLENE GLYCOL 3350 17 G: 17 POWDER, FOR SOLUTION ORAL at 15:39

## 2023-10-19 RX ADMIN — MAGNESIUM SULFATE HEPTAHYDRATE 2 G: 40 INJECTION, SOLUTION INTRAVENOUS at 15:40

## 2023-10-19 RX ADMIN — OXYCODONE HYDROCHLORIDE 5 MG: 5 TABLET ORAL at 23:22

## 2023-10-19 RX ADMIN — ENOXAPARIN SODIUM 60 MG: 80 INJECTION SUBCUTANEOUS at 15:40

## 2023-10-19 RX ADMIN — Medication 1 TABLET: at 17:52

## 2023-10-19 RX ADMIN — ENOXAPARIN SODIUM 60 MG: 80 INJECTION SUBCUTANEOUS at 22:07

## 2023-10-19 RX ADMIN — METOPROLOL SUCCINATE 50 MG: 50 TABLET, EXTENDED RELEASE ORAL at 22:07

## 2023-10-19 SDOH — SOCIAL STABILITY: SOCIAL INSECURITY: WERE YOU ABLE TO COMPLETE ALL THE BEHAVIORAL HEALTH SCREENINGS?: YES

## 2023-10-19 SDOH — SOCIAL STABILITY: SOCIAL INSECURITY: HAVE YOU HAD THOUGHTS OF HARMING ANYONE ELSE?: NO

## 2023-10-19 SDOH — SOCIAL STABILITY: SOCIAL INSECURITY: DO YOU FEEL ANYONE HAS EXPLOITED OR TAKEN ADVANTAGE OF YOU FINANCIALLY OR OF YOUR PERSONAL PROPERTY?: NO

## 2023-10-19 SDOH — SOCIAL STABILITY: SOCIAL INSECURITY: HAS ANYONE EVER THREATENED TO HURT YOUR FAMILY OR YOUR PETS?: NO

## 2023-10-19 SDOH — SOCIAL STABILITY: SOCIAL INSECURITY: ABUSE: ADULT

## 2023-10-19 SDOH — SOCIAL STABILITY: SOCIAL INSECURITY: ARE THERE ANY APPARENT SIGNS OF INJURIES/BEHAVIORS THAT COULD BE RELATED TO ABUSE/NEGLECT?: NO

## 2023-10-19 SDOH — SOCIAL STABILITY: SOCIAL INSECURITY: ARE YOU OR HAVE YOU BEEN THREATENED OR ABUSED PHYSICALLY, EMOTIONALLY, OR SEXUALLY BY ANYONE?: NO

## 2023-10-19 SDOH — SOCIAL STABILITY: SOCIAL INSECURITY: DO YOU FEEL UNSAFE GOING BACK TO THE PLACE WHERE YOU ARE LIVING?: NO

## 2023-10-19 SDOH — SOCIAL STABILITY: SOCIAL INSECURITY: DOES ANYONE TRY TO KEEP YOU FROM HAVING/CONTACTING OTHER FRIENDS OR DOING THINGS OUTSIDE YOUR HOME?: NO

## 2023-10-19 ASSESSMENT — LIFESTYLE VARIABLES
AUDIT-C TOTAL SCORE: 6
HOW OFTEN DURING THE LAST YEAR HAVE YOU HAD A FEELING OF GUILT OR REMORSE AFTER DRINKING: NEVER
AUDIT-C TOTAL SCORE: 4
EVER HAD A DRINK FIRST THING IN THE MORNING TO STEADY YOUR NERVES TO GET RID OF A HANGOVER: NO
HOW OFTEN DO YOU HAVE 6 OR MORE DRINKS ON ONE OCCASION: LESS THAN MONTHLY
AUDIT TOTAL SCORE: 0
HOW OFTEN DO YOU HAVE A DRINK CONTAINING ALCOHOL: 4 OR MORE TIMES A WEEK
HOW OFTEN DURING THE LAST YEAR HAVE YOU BEEN UNABLE TO REMEMBER WHAT HAPPENED THE NIGHT BEFORE BECAUSE YOU HAD BEEN DRINKING: NEVER
AUDIT-C TOTAL SCORE: 6
HOW MANY STANDARD DRINKS CONTAINING ALCOHOL DO YOU HAVE ON A TYPICAL DAY: 3 OR 4
EVER FELT BAD OR GUILTY ABOUT YOUR DRINKING: NO
HOW MANY STANDARD DRINKS CONTAINING ALCOHOL DO YOU HAVE ON A TYPICAL DAY: 3 OR 4
HAVE YOU EVER FELT YOU SHOULD CUT DOWN ON YOUR DRINKING: NO
HOW OFTEN DURING THE LAST YEAR HAVE YOU FOUND THAT YOU WERE NOT ABLE TO STOP DRINKING ONCE YOU HAD STARTED: NEVER
PRESCIPTION_ABUSE_PAST_12_MONTHS: NO
SKIP TO QUESTIONS 9-10: 0
HOW OFTEN DURING THE LAST YEAR HAVE YOU NEEDED AN ALCOHOLIC DRINK FIRST THING IN THE MORNING TO GET YOURSELF GOING AFTER A NIGHT OF HEAVY DRINKING: NEVER
HAVE PEOPLE ANNOYED YOU BY CRITICIZING YOUR DRINKING: NO
HAVE YOU OR SOMEONE ELSE BEEN INJURED AS A RESULT OF YOUR DRINKING: NO
AUDIT TOTAL SCORE: 4
HAS A RELATIVE, FRIEND, DOCTOR, OR ANOTHER HEALTH PROFESSIONAL EXPRESSED CONCERN ABOUT YOUR DRINKING OR SUGGESTED YOU CUT DOWN: NO
AUDIT-C TOTAL SCORE: 4
HOW OFTEN DO YOU HAVE 6 OR MORE DRINKS ON ONE OCCASION: LESS THAN MONTHLY
SKIP TO QUESTIONS 9-10: 0
HOW OFTEN DO YOU HAVE A DRINK CONTAINING ALCOHOL: 2-4 TIMES A MONTH
HOW OFTEN DURING THE LAST YEAR HAVE YOU FAILED TO DO WHAT WAS NORMALLY EXPECTED FROM YOU BECAUSE OF DRINKING: NEVER

## 2023-10-19 ASSESSMENT — PAIN - FUNCTIONAL ASSESSMENT
PAIN_FUNCTIONAL_ASSESSMENT: 0-10

## 2023-10-19 ASSESSMENT — COGNITIVE AND FUNCTIONAL STATUS - GENERAL
TURNING FROM BACK TO SIDE WHILE IN FLAT BAD: A LITTLE
DAILY ACTIVITIY SCORE: 19
WALKING IN HOSPITAL ROOM: A LITTLE
MOBILITY SCORE: 18
MOVING TO AND FROM BED TO CHAIR: A LITTLE
STANDING UP FROM CHAIR USING ARMS: A LITTLE
TOILETING: A LITTLE
PATIENT BASELINE BEDBOUND: NO
CLIMB 3 TO 5 STEPS WITH RAILING: A LITTLE
PERSONAL GROOMING: A LITTLE
HELP NEEDED FOR BATHING: A LITTLE
DRESSING REGULAR UPPER BODY CLOTHING: A LITTLE
DRESSING REGULAR LOWER BODY CLOTHING: A LITTLE
MOVING FROM LYING ON BACK TO SITTING ON SIDE OF FLAT BED WITH BEDRAILS: A LITTLE

## 2023-10-19 ASSESSMENT — PAIN DESCRIPTION - PROGRESSION: CLINICAL_PROGRESSION: NOT CHANGED

## 2023-10-19 ASSESSMENT — ACTIVITIES OF DAILY LIVING (ADL)
DRESSING YOURSELF: NEEDS ASSISTANCE
HEARING - LEFT EAR: FUNCTIONAL
ADEQUATE_TO_COMPLETE_ADL: YES
HEARING - RIGHT EAR: FUNCTIONAL
WALKS IN HOME: NEEDS ASSISTANCE
FEEDING YOURSELF: INDEPENDENT
TOILETING: NEEDS ASSISTANCE
GROOMING: NEEDS ASSISTANCE
ASSISTIVE_DEVICE: WALKER
BATHING: NEEDS ASSISTANCE
JUDGMENT_ADEQUATE_SAFELY_COMPLETE_DAILY_ACTIVITIES: YES
LACK_OF_TRANSPORTATION: NO
PATIENT'S MEMORY ADEQUATE TO SAFELY COMPLETE DAILY ACTIVITIES?: YES

## 2023-10-19 ASSESSMENT — PAIN DESCRIPTION - DESCRIPTORS: DESCRIPTORS: TENDER;THROBBING

## 2023-10-19 ASSESSMENT — COLUMBIA-SUICIDE SEVERITY RATING SCALE - C-SSRS
2. HAVE YOU ACTUALLY HAD ANY THOUGHTS OF KILLING YOURSELF?: NO
1. SINCE LAST CONTACT, HAVE YOU WISHED YOU WERE DEAD OR WISHED YOU COULD GO TO SLEEP AND NOT WAKE UP?: NO
6. HAVE YOU EVER DONE ANYTHING, STARTED TO DO ANYTHING, OR PREPARED TO DO ANYTHING TO END YOUR LIFE?: NO
1. IN THE PAST MONTH, HAVE YOU WISHED YOU WERE DEAD OR WISHED YOU COULD GO TO SLEEP AND NOT WAKE UP?: NO
6. HAVE YOU EVER DONE ANYTHING, STARTED TO DO ANYTHING, OR PREPARED TO DO ANYTHING TO END YOUR LIFE?: NO
2. HAVE YOU ACTUALLY HAD ANY THOUGHTS OF KILLING YOURSELF?: NO

## 2023-10-19 ASSESSMENT — PAIN SCALES - GENERAL
PAINLEVEL_OUTOF10: 6
PAINLEVEL_OUTOF10: 8
PAINLEVEL_OUTOF10: 8
PAINLEVEL_OUTOF10: 6

## 2023-10-19 ASSESSMENT — PATIENT HEALTH QUESTIONNAIRE - PHQ9
2. FEELING DOWN, DEPRESSED OR HOPELESS: NOT AT ALL
SUM OF ALL RESPONSES TO PHQ9 QUESTIONS 1 & 2: 0
SUM OF ALL RESPONSES TO PHQ9 QUESTIONS 1 & 2: 0
1. LITTLE INTEREST OR PLEASURE IN DOING THINGS: NOT AT ALL
1. LITTLE INTEREST OR PLEASURE IN DOING THINGS: NOT AT ALL
2. FEELING DOWN, DEPRESSED OR HOPELESS: NOT AT ALL

## 2023-10-19 ASSESSMENT — PAIN SCALES - PAIN ASSESSMENT IN ADVANCED DEMENTIA (PAINAD): BREATHING: NORMAL

## 2023-10-19 ASSESSMENT — PAIN DESCRIPTION - LOCATION: LOCATION: LEG

## 2023-10-19 ASSESSMENT — PAIN DESCRIPTION - ORIENTATION: ORIENTATION: LEFT

## 2023-10-19 NOTE — PROGRESS NOTES
Care transitions assessment completed via bedside with patient. TCC introduced self and explained role. Demographics verified. Patient from home with brother.  Uses walker.  Denies SW needs at this time. Patient was recently admitted and discharged to SNF [facility]. Patient was discharged from SNF and is living with brother. Patient states was supposed to get C, but they didn't start services yet. Patient not wanting to return to [facility], if SNF recommended.  Dispo pending hospital course, PT/OT evals. Need PT/OT order, provider notified.  TCC to continue to follow for discharge planning needs.      PCP: Erik Avila Last seen: This admission       10/19/23 2004   Discharge Planning   Living Arrangements Family members   Support Systems Family members   Assistance Needed Needs assist, uses walker at home   Type of Residence Private residence   Number of Stairs to Enter Residence 4   Number of Stairs Within Residence 12   Who is requesting discharge planning? Provider   Home or Post Acute Services Post acute facilities (Rehab/SNF/etc)   Type of Post Acute Facility Services Skilled nursing;Rehab   Patient expects to be discharged to: SNF vs HHC   Does the patient need discharge transport arranged? Yes   RoundTrip coordination needed? Yes   Has discharge transport been arranged? No   Financial Resource Strain   How hard is it for you to pay for the very basics like food, housing, medical care, and heating? Not hard   Housing Stability   In the last 12 months, was there a time when you were not able to pay the mortgage or rent on time? N   In the last 12 months, how many places have you lived? 1   In the last 12 months, was there a time when you did not have a steady place to sleep or slept in a shelter (including now)? N   Transportation Needs   In the past 12 months, has lack of transportation kept you from medical appointments or from getting medications? no   In the past 12 months, has  lack of transportation kept you from meetings, work, or from getting things needed for daily living? No   Patient Choice   Provider Choice list and CMS website (https://medicare.gov/care-compare#search) for post-acute Quality and Resource Measure Data were provided and reviewed with: Patient   Patient / Family choosing to utilize agency / facility established prior to hospitalization Yes          America Nelson RN ED TCC

## 2023-10-19 NOTE — ED NOTES
Patient resting in wheelchair, vitals stable. RN report from [facility], will continue to monitor      Jamilah Villatoro RN  10/19/23 1917

## 2023-10-19 NOTE — H&P
History Of Present Illness  Chris Chance is a 42 y.o. male with past medical history significant for morbid obesity, hypertension, hyperlipidemia, gout, asthma, and MICHAEL who presents with bilateral lower extremity pain and wounds.  Presents today due to inability to care for himself at home, states that house was not prepared for him after discharge from nursing home and patient want to be placed somewhere after discharge from hospital.  Recent Tissue/Wound culture on 10/13/2023 grew multiple MDRO's including abundant Enterobacter clocae complex & Moderate Acinetobacter calcoaceticus-baumannii complex.  Patient states he had a recent surgical debridement done on September 29.  Has been in nursing home and working with PT/OT for the past couple weeks.  States he tested positive for COVID about a week ago as well.  Was released from the nursing home yesterday and when he got back home he had difficulty getting up the stairs into the house and also had difficulty getting around inside the house.  States there was no new bigger bed and also no commode, says the house is not prepared for him.  Patient was ambulating around with a walker at home.  Says he had a fall last night where he slid out of his couch with it rolling somewhat on top of him.  Denies any new pain or injuries. Denies any loss of consciousness during this. Denies any headaches or dizziness, chest pain, nausea or vomiting, fevers or chills.  Does admit to some generalized abdominal pain.  Also admits to occasional shortness of breath, history of asthma.  Admits to occasional constipation, last bowel movement this morning. Patient admits to pain and weakness in his lower extremities, says this is chronic however.  Patient was sent home with oxygen to start using, 2 L.  Patient's mother is at bedside with him.      ER course: On admission, patient is afebrile, hypertensive with a blood pressure 158/91, otherwise hemodynamically stable with SPO2 97% on  room air.  Glucose 105, albumin 2.9, AST 56, total protein 5.9, magnesium 1.33.  WBC 14.1 with left shift, hemoglobin 9.0/hematocrit 30.7. Recent Tissue/Wound culture on 10/13/2023 grew multiple MDRO's including abundant Enterobacter clocae complex & Moderate Acinetobacter calcoaceticus-baumannii complex.  Patient given Percocet tablet in the ED.     Past Medical History  As noted above in HPI    Surgical History  Noted above in HPI    Social History  He reports that he has never smoked. He has never used smokeless tobacco. He reports that he does not drink alcohol and does not use drugs.    Family History  No family history on file.     Allergies  Patient has no known allergies.    Review of Systems  10 point review of system is negative except for as noted above in HPI    Physical Exam  Constitutional:       General: He is not in acute distress.     Appearance: He is obese. He is not toxic-appearing.      Comments: Patient currently sitting up in a wheelchair.  Mother is at bedside.   HENT:      Head: Normocephalic and atraumatic.      Mouth/Throat:      Mouth: Mucous membranes are moist.      Pharynx: Oropharynx is clear.   Eyes:      Extraocular Movements: Extraocular movements intact.      Conjunctiva/sclera: Conjunctivae normal.      Pupils: Pupils are equal, round, and reactive to light.   Cardiovascular:      Rate and Rhythm: Normal rate and regular rhythm.   Pulmonary:      Comments: Diminished breath sounds due to body habitus.  Some slight wheezing noted.  Abdominal:      General: Bowel sounds are normal. There is distension.      Tenderness: There is abdominal tenderness (Generalized tenderness to abdominal area.). There is no guarding.   Musculoskeletal:         General: Swelling present.      Right lower leg: Edema present.      Left lower leg: Edema present.   Skin:     General: Skin is warm and dry.      Comments: Discoloration, edema, and swelling to bilateral lower extremities.  Wound on bottom  "left heel.  Dressing still on legs, going to wait for patient to be placed in a room and examined by podiatry to undress legs fully due to patient being in pain.   Neurological:      General: No focal deficit present.      Mental Status: He is alert and oriented to person, place, and time.   Psychiatric:         Mood and Affect: Mood normal.         Behavior: Behavior normal.       Last Recorded Vitals  Blood pressure (!) 158/91, pulse 92, temperature 36.9 °C (98.4 °F), resp. rate 20, height 1.753 m (5' 9\"), weight (!) 181 kg (400 lb), SpO2 97 %.    Relevant Results  Results for orders placed or performed during the hospital encounter of 10/19/23 (from the past 24 hour(s))   CBC and Auto Differential   Result Value Ref Range    WBC 14.1 (H) 4.4 - 11.3 x10*3/uL    nRBC 0.4 (H) 0.0 - 0.0 /100 WBCs    RBC 2.82 (L) 4.50 - 5.90 x10*6/uL    Hemoglobin 9.0 (L) 13.5 - 17.5 g/dL    Hematocrit 30.7 (L) 41.0 - 52.0 %     (H) 80 - 100 fL    MCH 31.9 26.0 - 34.0 pg    MCHC 29.3 (L) 32.0 - 36.0 g/dL    RDW 15.9 (H) 11.5 - 14.5 %    Platelets 287 150 - 450 x10*3/uL    MPV 8.6 7.5 - 11.5 fL    Immature Granulocytes %, Automated 9.0 (H) 0.0 - 0.9 %    Immature Granulocytes Absolute, Automated 1.26 (H) 0.00 - 0.70 x10*3/uL   Comprehensive metabolic panel   Result Value Ref Range    Glucose 105 (H) 74 - 99 mg/dL    Sodium 138 136 - 145 mmol/L    Potassium 4.8 3.5 - 5.3 mmol/L    Chloride 99 98 - 107 mmol/L    Bicarbonate 31 21 - 32 mmol/L    Anion Gap 13 10 - 20 mmol/L    Urea Nitrogen 18 6 - 23 mg/dL    Creatinine 1.13 0.50 - 1.30 mg/dL    eGFR 83 >60 mL/min/1.73m*2    Calcium 9.8 8.6 - 10.3 mg/dL    Albumin 2.9 (L) 3.4 - 5.0 g/dL    Alkaline Phosphatase 98 33 - 120 U/L    Total Protein 5.9 (L) 6.4 - 8.2 g/dL    AST 56 (H) 9 - 39 U/L    Bilirubin, Total 0.5 0.0 - 1.2 mg/dL    ALT 28 10 - 52 U/L   Magnesium   Result Value Ref Range    Magnesium 1.33 (L) 1.60 - 2.40 mg/dL   Manual Differential   Result Value Ref Range    " Neutrophils %, Manual 80.0 40.0 - 80.0 %    Bands %, Manual 1.0 0.0 - 5.0 %    Lymphocytes %, Manual 8.0 13.0 - 44.0 %    Monocytes %, Manual 4.0 2.0 - 10.0 %    Eosinophils %, Manual 1.0 0.0 - 6.0 %    Basophils %, Manual 0.0 0.0 - 2.0 %    Metamyelocytes %, Manual 6.0 0.0 - 0.0 %    Seg Neutrophils Absolute, Manual 11.28 (H) 1.20 - 7.00 x10*3/uL    Bands Absolute, Manual 0.14 0.00 - 0.70 x10*3/uL    Lymphocytes Absolute, Manual 1.13 (L) 1.20 - 4.80 x10*3/uL    Monocytes Absolute, Manual 0.56 0.10 - 1.00 x10*3/uL    Eosinophils Absolute, Manual 0.14 0.00 - 0.70 x10*3/uL    Basophils Absolute, Manual 0.00 0.00 - 0.10 x10*3/uL    Metamyelocytes Absolute, Manual 0.85 0.00 - 0.00 x10*3/uL    Total Cells Counted 100     Neutrophils Absolute, Manual 11.42 (H) 1.20 - 7.70 x10*3/uL    RBC Morphology See Below     Polychromasia Mild     Teardrop Cells Few    C-reactive protein   Result Value Ref Range    C-Reactive Protein 1.66 (H) <1.00 mg/dL   Sedimentation rate, automated   Result Value Ref Range    Sedimentation Rate 44 (H) 0 - 15 mm/h   Type And Screen   Result Value Ref Range    ABO TYPE B     Rh TYPE POS     ANTIBODY SCREEN NEG       Assessment/Plan   Principal Problem:    Generalized weakness  Active Problems:    Multiple open wounds of lower extremity, complicated, sequela    Chris Chance is a 42 y.o. male with past medical history significant for morbid obesity, hypertension, hyperlipidemia, gout, asthma, and MICHAEL who presents with bilateral lower extremity pain and wounds.  Presents today due to inability to care for himself at home, states that house was not prepared for him after discharge from nursing home and patient want to be placed somewhere after discharge from hospital.  Recent Tissue/Wound culture on 10/13/2023 grew multiple MDRO's including abundant Enterobacter clocae complex & Moderate Acinetobacter calcoaceticus-baumannii complex.  Was taken vancomycin and then switched to oral Bactrim which she  is still taking.  Patient states he had a recent surgical debridement done on September 29. Has been in nursing home and working with PT/OT for the past couple weeks.  States he tested positive for COVID about a week ago as well.  Was released from the nursing home yesterday and when he got back home he had difficulty getting up the stairs into the house and also had difficulty getting around inside the house.  States there was no new bigger bed and also no commode, says the house is not prepared for him.  Patient was ambulating around with a walker at home.      #History of Bilateral leg wounds-edematous  #Leukocytosis, WBC 14.1 today  Podiatry consult, hold on antibiotics until seen by podiatry  Social work consult for placement after discharge  Admit for observation  Q8 vitals  Cardiac diet  CBC, BMP in a.m.  ESR, CRP added on  Urinalysis  Tylenol as needed for mild pain  Oxycodone as needed for moderate pain    #Hypomagnesemia, level 1.33 today  Magnesium replacement  Repeat magnesium in the a.m.    #Shortness of breath, history of asthma  Albuterol as needed for shortness of breath or wheezing  Breo Ellipta    #Constipation  Colace, MiraLAX    #weakness in lower extremities  PT/OT for evaluation and treatment     Continue patient's at home medications as appropriate    Chronic conditions:  #Obesity  #Hypertension  #Hyperlipidemia  #Gout  #MICHAEL  #Asthma    #DVT prophylaxis  Lovenox  Ambulation as tolerated with walker       I spent 45 minutes in the professional and overall care of this patient.    Erik Avila, DO

## 2023-10-19 NOTE — PROGRESS NOTES
Pharmacy Medication History Review    Chris Chance is a 42 y.o. male admitted for No Principal Problem: There is no principal problem currently on the Problem List. Please update the Problem List and refresh.. Pharmacy reviewed the patient's [id number] medications and allergies for accuracy.    The list below reflectives the updated PTA list. Please review each medication in order reconciliation for additional clarification and justification.  Prior to Admission Medications   Prescriptions Last Dose Informant Patient Reported? Taking?   Symbicort 160-4.5 mcg/actuation inhaler 10/18/2023  Yes Yes   Sig: Inhale 2 puffs 2 times a day.   acetaminophen (Tylenol) 325 mg tablet 10/18/2023  No No   Sig: Take 2 tablets (650 mg) by mouth every 4 hours if needed for fever (temp greater than 38.0 C) (pain).   albuterol 90 mcg/actuation inhaler n/a  No No   Sig: Inhale 2 puffs every 6 hours if needed for wheezing or shortness of breath.   docusate sodium (Colace) 100 mg capsule Unknown Self Yes No   Sig: Take 1 capsule (100 mg) by mouth 2 times a day as needed for constipation.   lactobacillus acidophilus capsule 10/18/2023  No No   Sig: Take 1 capsule by mouth once daily.   lactobacillus acidophilus tablet tablet   No No   Sig: Take 1 tablet by mouth once daily. Do not start before October 6, 2023.   metoprolol succinate XL (Toprol-XL) 25 mg 24 hr tablet   No No   Sig: Take 1 tablet (25 mg) by mouth once daily. Do not crush or chew. Do not start before October 3, 2023.   metoprolol succinate XL (Toprol-XL) 50 mg 24 hr tablet 10/18/2023  No No   Sig: Take 1 tablet (50 mg) by mouth once daily. Do not crush or chew. Do not start before October 4, 2023.   Patient taking differently: Take 1 tablet (50 mg) by mouth once daily in the evening. Do not crush or chew.   nystatin (Mycostatin) 100,000 unit/gram powder 10/18/2023  No No   Sig: Apply topically 2 times a day.   oxyCODONE (Roxicodone) 5 mg immediate release tablet  10/19/2023  No No   Sig: Take 1 tablet (5 mg) by mouth every 4 hours if needed for moderate pain (4 - 6).   polyethylene glycol (Glycolax, Miralax) 17 gram packet 10/18/2023  No No   Sig: Take 17 g by mouth once daily. Do not start before October 6, 2023.   simethicone (Mylicon) 80 mg chewable tablet n/a  No No   Sig: Chew 1 tablet (80 mg) 3 times a day as needed (gas).   sulfamethoxazole-trimethoprim (Bactrim DS) 800-160 mg tablet 10/19/2023  Yes No   Sig: Take 1 tablet by mouth 2 times a day.   vancomycin (Vancocin) 125 mg capsule   No No   Sig: Take 1 capsule (125 mg) by mouth 4 times a day.   vancomycin (Vancocin) 125 mg capsule   No No   Sig: Take 1 capsule (125 mg) by mouth 4 times a day.      Facility-Administered Medications: None        The list below reflectives the updated allergy list. Please review each documented allergy for additional clarification and justification.  Allergies  Reviewed by Aparna Martin RN on 10/19/2023   No Known Allergies         Below are additional concerns with the patient's PTA list.      Debbie Aguilar CPhT

## 2023-10-19 NOTE — H&P
History Of Present Illness  Chris Chance is a 42 y.o. male with past medical history significant for morbid obesity, hypertension, hyperlipidemia, gout, asthma, and MICHAEL who presents with bilateral lower extremity pain and wounds.  Presents today due to inability to care for himself at home, states that house was not prepared for him after discharge from nursing home and patient want to be placed somewhere after discharge from hospital.  Recent Tissue/Wound culture on 10/13/2023 grew multiple MDRO's including abundant Enterobacter clocae complex & Moderate Acinetobacter calcoaceticus-baumannii complex.  Patient states he had a recent surgical debridement done on September 29.  Has been in nursing home and working with PT/OT for the past couple weeks.  States he tested positive for COVID about a week ago as well.  Was released from the nursing home yesterday and when he got back home he had difficulty getting up the stairs into the house and also had difficulty getting around inside the house.  States there was no new bigger bed and also no commode, says the house is not prepared for him.  Patient was ambulating around with a walker at home.  Says he had a fall last night where he slid out of his couch with it rolling somewhat on top of him.  Denies any new pain or injuries. Denies any loss of consciousness during this. Denies any headaches or dizziness, chest pain, nausea or vomiting, fevers or chills.  Does admit to some generalized abdominal pain.  Also admits to occasional shortness of breath, history of asthma.  Admits to occasional constipation, last bowel movement this morning. Patient admits to pain and weakness in his lower extremities, says this is chronic however.  Patient was sent home with oxygen to start using, 2 L.  Patient's mother is at bedside with him.      ER course: On admission, patient is afebrile, hypertensive with a blood pressure 158/91, otherwise hemodynamically stable with SPO2 97% on  room air.  Glucose 105, albumin 2.9, AST 56, total protein 5.9, magnesium 1.33.  WBC 14.1 with left shift, hemoglobin 9.0/hematocrit 30.7. Recent Tissue/Wound culture on 10/13/2023 grew multiple MDRO's including abundant Enterobacter clocae complex & Moderate Acinetobacter calcoaceticus-baumannii complex.  Patient given Percocet tablet in the ED.    Admitting provider is Dr. Tommie Avila      Past Medical History  As noted above in HPI    Surgical History  Noted above in HPI    Social History  He reports that he has never smoked. He has never used smokeless tobacco. He reports that he does not drink alcohol and does not use drugs.    Family History  No family history on file.     Allergies  Patient has no known allergies.    Review of Systems  10 point review of system is negative except for as noted above in HPI    Physical Exam  Constitutional:       General: He is not in acute distress.     Appearance: He is obese. He is not toxic-appearing.      Comments: Patient currently sitting up in a wheelchair.  Mother is at bedside.   HENT:      Head: Normocephalic and atraumatic.      Mouth/Throat:      Mouth: Mucous membranes are moist.      Pharynx: Oropharynx is clear.   Eyes:      Extraocular Movements: Extraocular movements intact.      Conjunctiva/sclera: Conjunctivae normal.      Pupils: Pupils are equal, round, and reactive to light.   Cardiovascular:      Rate and Rhythm: Normal rate and regular rhythm.   Pulmonary:      Comments: Diminished breath sounds due to body habitus.  Some slight wheezing noted.  Abdominal:      General: Bowel sounds are normal. There is distension.      Tenderness: There is abdominal tenderness (Generalized tenderness to abdominal area.). There is no guarding.   Musculoskeletal:         General: Swelling present.      Right lower leg: Edema present.      Left lower leg: Edema present.   Skin:     General: Skin is warm and dry.      Comments: Discoloration, edema, and swelling to  "bilateral lower extremities.  Wound on bottom left heel.  Dressing still on legs, going to wait for patient to be placed in a room and examined by podiatry to undress legs fully due to patient being in pain.   Neurological:      General: No focal deficit present.      Mental Status: He is alert and oriented to person, place, and time.   Psychiatric:         Mood and Affect: Mood normal.         Behavior: Behavior normal.       Last Recorded Vitals  Blood pressure (!) 158/91, pulse 92, temperature 36.9 °C (98.4 °F), resp. rate 20, height 1.753 m (5' 9\"), weight (!) 181 kg (400 lb), SpO2 97 %.    Relevant Results  Results for orders placed or performed during the hospital encounter of 10/19/23 (from the past 24 hour(s))   CBC and Auto Differential   Result Value Ref Range    WBC 14.1 (H) 4.4 - 11.3 x10*3/uL    nRBC 0.4 (H) 0.0 - 0.0 /100 WBCs    RBC 2.82 (L) 4.50 - 5.90 x10*6/uL    Hemoglobin 9.0 (L) 13.5 - 17.5 g/dL    Hematocrit 30.7 (L) 41.0 - 52.0 %     (H) 80 - 100 fL    MCH 31.9 26.0 - 34.0 pg    MCHC 29.3 (L) 32.0 - 36.0 g/dL    RDW 15.9 (H) 11.5 - 14.5 %    Platelets 287 150 - 450 x10*3/uL    MPV 8.6 7.5 - 11.5 fL    Immature Granulocytes %, Automated 9.0 (H) 0.0 - 0.9 %    Immature Granulocytes Absolute, Automated 1.26 (H) 0.00 - 0.70 x10*3/uL   Comprehensive metabolic panel   Result Value Ref Range    Glucose 105 (H) 74 - 99 mg/dL    Sodium 138 136 - 145 mmol/L    Potassium 4.8 3.5 - 5.3 mmol/L    Chloride 99 98 - 107 mmol/L    Bicarbonate 31 21 - 32 mmol/L    Anion Gap 13 10 - 20 mmol/L    Urea Nitrogen 18 6 - 23 mg/dL    Creatinine 1.13 0.50 - 1.30 mg/dL    eGFR 83 >60 mL/min/1.73m*2    Calcium 9.8 8.6 - 10.3 mg/dL    Albumin 2.9 (L) 3.4 - 5.0 g/dL    Alkaline Phosphatase 98 33 - 120 U/L    Total Protein 5.9 (L) 6.4 - 8.2 g/dL    AST 56 (H) 9 - 39 U/L    Bilirubin, Total 0.5 0.0 - 1.2 mg/dL    ALT 28 10 - 52 U/L   Magnesium   Result Value Ref Range    Magnesium 1.33 (L) 1.60 - 2.40 mg/dL   Manual " Differential   Result Value Ref Range    Neutrophils %, Manual 80.0 40.0 - 80.0 %    Bands %, Manual 1.0 0.0 - 5.0 %    Lymphocytes %, Manual 8.0 13.0 - 44.0 %    Monocytes %, Manual 4.0 2.0 - 10.0 %    Eosinophils %, Manual 1.0 0.0 - 6.0 %    Basophils %, Manual 0.0 0.0 - 2.0 %    Metamyelocytes %, Manual 6.0 0.0 - 0.0 %    Seg Neutrophils Absolute, Manual 11.28 (H) 1.20 - 7.00 x10*3/uL    Bands Absolute, Manual 0.14 0.00 - 0.70 x10*3/uL    Lymphocytes Absolute, Manual 1.13 (L) 1.20 - 4.80 x10*3/uL    Monocytes Absolute, Manual 0.56 0.10 - 1.00 x10*3/uL    Eosinophils Absolute, Manual 0.14 0.00 - 0.70 x10*3/uL    Basophils Absolute, Manual 0.00 0.00 - 0.10 x10*3/uL    Metamyelocytes Absolute, Manual 0.85 0.00 - 0.00 x10*3/uL    Total Cells Counted 100     Neutrophils Absolute, Manual 11.42 (H) 1.20 - 7.70 x10*3/uL    RBC Morphology See Below     Polychromasia Mild     Teardrop Cells Few       Assessment/Plan   Principal Problem:    Generalized weakness  Active Problems:    Multiple open wounds of lower extremity, complicated, sequela    Chris Chance is a 42 y.o. male with past medical history significant for morbid obesity, hypertension, hyperlipidemia, gout, asthma, and MICHAEL who presents with bilateral lower extremity pain and wounds.  Presents today due to inability to care for himself at home, states that house was not prepared for him after discharge from nursing home and patient want to be placed somewhere after discharge from hospital.  Recent Tissue/Wound culture on 10/13/2023 grew multiple MDRO's including abundant Enterobacter clocae complex & Moderate Acinetobacter calcoaceticus-baumannii complex.  Was taken vancomycin and then switched to oral Bactrim which she is still taking.  Patient states he had a recent surgical debridement done on September 29. Has been in nursing home and working with PT/OT for the past couple weeks.  States he tested positive for COVID about a week ago as well.  Was released  from the nursing home yesterday and when he got back home he had difficulty getting up the stairs into the house and also had difficulty getting around inside the house.  States there was no new bigger bed and also no commode, says the house is not prepared for him.  Patient was ambulating around with a walker at home.      #History of Bilateral leg wounds-edematous  #Leukocytosis, WBC 14.1 today  Podiatry consult, hold on antibiotics until seen by podiatry  Social work consult for placement after discharge  Admit for observation  Q8 vitals  Cardiac diet  CBC, BMP in a.m.  ESR, CRP added on  Urinalysis  Tylenol as needed for mild pain  Oxycodone as needed for moderate pain    #Hypomagnesemia, level 1.33 today  Magnesium replacement  Repeat magnesium in the a.m.    #Shortness of breath, history of asthma  Albuterol as needed for shortness of breath or wheezing  Breo Ellipta    #Constipation  Colace, MiraLAX    #weakness in lower extremities  PT/OT for evaluation and treatment     Continue patient's at home medications as appropriate    Chronic conditions:  #Obesity  #Hypertension  #Hyperlipidemia  #Gout  #MICHAEL  #Asthma    #DVT prophylaxis  Lovenox  Ambulation as tolerated with walker       I spent 25 minutes in the professional and overall care of this patient.    Anibal Frazier PA-C

## 2023-10-20 PROBLEM — R26.2 UNABLE TO AMBULATE: Status: ACTIVE | Noted: 2023-10-20

## 2023-10-20 LAB
ANION GAP SERPL CALC-SCNC: 11 MMOL/L (ref 10–20)
BUN SERPL-MCNC: 15 MG/DL (ref 6–23)
CALCIUM SERPL-MCNC: 9.2 MG/DL (ref 8.6–10.3)
CHLORIDE SERPL-SCNC: 98 MMOL/L (ref 98–107)
CO2 SERPL-SCNC: 32 MMOL/L (ref 21–32)
CREAT SERPL-MCNC: 1.11 MG/DL (ref 0.5–1.3)
ERYTHROCYTE [DISTWIDTH] IN BLOOD BY AUTOMATED COUNT: 15.9 % (ref 11.5–14.5)
GFR SERPL CREATININE-BSD FRML MDRD: 85 ML/MIN/1.73M*2
GLUCOSE SERPL-MCNC: 81 MG/DL (ref 74–99)
HCT VFR BLD AUTO: 30 % (ref 41–52)
HGB BLD-MCNC: 8.5 G/DL (ref 13.5–17.5)
MAGNESIUM SERPL-MCNC: 1.4 MG/DL (ref 1.6–2.4)
MCH RBC QN AUTO: 31.6 PG (ref 26–34)
MCHC RBC AUTO-ENTMCNC: 28.3 G/DL (ref 32–36)
MCV RBC AUTO: 112 FL (ref 80–100)
NRBC BLD-RTO: 0.4 /100 WBCS (ref 0–0)
PLATELET # BLD AUTO: 277 X10*3/UL (ref 150–450)
PMV BLD AUTO: 8.6 FL (ref 7.5–11.5)
POTASSIUM SERPL-SCNC: 4.2 MMOL/L (ref 3.5–5.3)
RBC # BLD AUTO: 2.69 X10*6/UL (ref 4.5–5.9)
SODIUM SERPL-SCNC: 137 MMOL/L (ref 136–145)
WBC # BLD AUTO: 16.6 X10*3/UL (ref 4.4–11.3)

## 2023-10-20 PROCEDURE — 99232 SBSQ HOSP IP/OBS MODERATE 35: CPT | Performed by: INTERNAL MEDICINE

## 2023-10-20 PROCEDURE — 96372 THER/PROPH/DIAG INJ SC/IM: CPT | Performed by: PHYSICIAN ASSISTANT

## 2023-10-20 PROCEDURE — 80048 BASIC METABOLIC PNL TOTAL CA: CPT | Performed by: PHYSICIAN ASSISTANT

## 2023-10-20 PROCEDURE — 85027 COMPLETE CBC AUTOMATED: CPT | Performed by: PHYSICIAN ASSISTANT

## 2023-10-20 PROCEDURE — 36415 COLL VENOUS BLD VENIPUNCTURE: CPT | Performed by: PHYSICIAN ASSISTANT

## 2023-10-20 PROCEDURE — 2500000004 HC RX 250 GENERAL PHARMACY W/ HCPCS (ALT 636 FOR OP/ED): Performed by: PHYSICIAN ASSISTANT

## 2023-10-20 PROCEDURE — 97166 OT EVAL MOD COMPLEX 45 MIN: CPT | Mod: GO

## 2023-10-20 PROCEDURE — 83735 ASSAY OF MAGNESIUM: CPT | Performed by: PHYSICIAN ASSISTANT

## 2023-10-20 PROCEDURE — 2500000001 HC RX 250 WO HCPCS SELF ADMINISTERED DRUGS (ALT 637 FOR MEDICARE OP): Performed by: PHYSICIAN ASSISTANT

## 2023-10-20 PROCEDURE — 1100000001 HC PRIVATE ROOM DAILY

## 2023-10-20 PROCEDURE — 2500000004 HC RX 250 GENERAL PHARMACY W/ HCPCS (ALT 636 FOR OP/ED): Performed by: INTERNAL MEDICINE

## 2023-10-20 PROCEDURE — 2500000004 HC RX 250 GENERAL PHARMACY W/ HCPCS (ALT 636 FOR OP/ED): Performed by: NURSE PRACTITIONER

## 2023-10-20 PROCEDURE — 97162 PT EVAL MOD COMPLEX 30 MIN: CPT | Mod: GP

## 2023-10-20 RX ORDER — CEFTRIAXONE 2 G/50ML
2 INJECTION, SOLUTION INTRAVENOUS EVERY 24 HOURS
Status: DISCONTINUED | OUTPATIENT
Start: 2023-10-20 | End: 2023-10-20

## 2023-10-20 RX ORDER — LANOLIN ALCOHOL/MO/W.PET/CERES
400 CREAM (GRAM) TOPICAL DAILY
Status: DISCONTINUED | OUTPATIENT
Start: 2023-10-20 | End: 2023-10-29 | Stop reason: HOSPADM

## 2023-10-20 RX ORDER — MAGNESIUM SULFATE HEPTAHYDRATE 40 MG/ML
2 INJECTION, SOLUTION INTRAVENOUS ONCE
Status: COMPLETED | OUTPATIENT
Start: 2023-10-20 | End: 2023-10-20

## 2023-10-20 RX ORDER — MEROPENEM 1 G/1
1 INJECTION, POWDER, FOR SOLUTION INTRAVENOUS EVERY 8 HOURS
Status: DISCONTINUED | OUTPATIENT
Start: 2023-10-20 | End: 2023-10-25

## 2023-10-20 RX ADMIN — Medication 1 TABLET: at 09:11

## 2023-10-20 RX ADMIN — MAGNESIUM OXIDE TAB 400 MG (241.3 MG ELEMENTAL MG) 400 MG: 400 (241.3 MG) TAB at 09:36

## 2023-10-20 RX ADMIN — MEROPENEM 1 G: 1 INJECTION, POWDER, FOR SOLUTION INTRAVENOUS at 17:29

## 2023-10-20 RX ADMIN — ENOXAPARIN SODIUM 60 MG: 80 INJECTION SUBCUTANEOUS at 22:33

## 2023-10-20 RX ADMIN — CEFTRIAXONE SODIUM 2 G: 2 INJECTION, SOLUTION INTRAVENOUS at 13:25

## 2023-10-20 RX ADMIN — MAGNESIUM SULFATE HEPTAHYDRATE 2 G: 40 INJECTION, SOLUTION INTRAVENOUS at 09:36

## 2023-10-20 RX ADMIN — MEROPENEM 1 G: 1 INJECTION, POWDER, FOR SOLUTION INTRAVENOUS at 22:31

## 2023-10-20 RX ADMIN — METOPROLOL SUCCINATE 50 MG: 50 TABLET, EXTENDED RELEASE ORAL at 22:33

## 2023-10-20 RX ADMIN — ENOXAPARIN SODIUM 60 MG: 80 INJECTION SUBCUTANEOUS at 09:11

## 2023-10-20 RX ADMIN — OXYCODONE HYDROCHLORIDE 5 MG: 5 TABLET ORAL at 03:30

## 2023-10-20 RX ADMIN — SULFAMETHOXAZOLE AND TRIMETHOPRIM 1 TABLET: 800; 160 TABLET ORAL at 22:33

## 2023-10-20 ASSESSMENT — COGNITIVE AND FUNCTIONAL STATUS - GENERAL
CLIMB 3 TO 5 STEPS WITH RAILING: A LOT
DRESSING REGULAR LOWER BODY CLOTHING: TOTAL
STANDING UP FROM CHAIR USING ARMS: A LITTLE
DRESSING REGULAR UPPER BODY CLOTHING: A LITTLE
STANDING UP FROM CHAIR USING ARMS: A LITTLE
MOVING TO AND FROM BED TO CHAIR: A LITTLE
DAILY ACTIVITIY SCORE: 19
TOILETING: A LOT
PERSONAL GROOMING: A LITTLE
CLIMB 3 TO 5 STEPS WITH RAILING: A LITTLE
TURNING FROM BACK TO SIDE WHILE IN FLAT BAD: A LITTLE
TOILETING: A LITTLE
MOBILITY SCORE: 17
DAILY ACTIVITIY SCORE: 15
DRESSING REGULAR UPPER BODY CLOTHING: A LITTLE
TURNING FROM BACK TO SIDE WHILE IN FLAT BAD: A LITTLE
MOVING TO AND FROM BED TO CHAIR: A LITTLE
MOVING FROM LYING ON BACK TO SITTING ON SIDE OF FLAT BED WITH BEDRAILS: A LITTLE
HELP NEEDED FOR BATHING: A LITTLE
WALKING IN HOSPITAL ROOM: A LITTLE
MOBILITY SCORE: 18
DRESSING REGULAR LOWER BODY CLOTHING: A LITTLE
WALKING IN HOSPITAL ROOM: A LITTLE
HELP NEEDED FOR BATHING: A LOT
MOVING FROM LYING ON BACK TO SITTING ON SIDE OF FLAT BED WITH BEDRAILS: A LITTLE
PERSONAL GROOMING: A LITTLE

## 2023-10-20 ASSESSMENT — PAIN SCALES - GENERAL
PAINLEVEL_OUTOF10: 0 - NO PAIN
PAINLEVEL_OUTOF10: 5 - MODERATE PAIN
PAINLEVEL_OUTOF10: 5 - MODERATE PAIN
PAINLEVEL_OUTOF10: 6

## 2023-10-20 ASSESSMENT — PAIN - FUNCTIONAL ASSESSMENT
PAIN_FUNCTIONAL_ASSESSMENT: 0-10

## 2023-10-20 NOTE — PROGRESS NOTES
TCC Note: Spoke with MD earlier in the day. MD stated that Long Term Care would be the best discharge plan for pt. I agreed. Provided SNF/LTC list to pt per [facility] Directory, which  includes facilities that are within  [facility] network as well as meeting patient's medical needs and are in-network for patient's insurance while also in discharge geographic are patient/family prefers. List identifies each facilities CMS star rating. Patient/family to review SNF list and provide choices for LTC preference. Spoke with Mother and she provided me with 3 choices of [facility] and [facility]. Sending request to [facility] to send referrals. Will follow. Gill Barboza, MSN, RN, TCC.

## 2023-10-20 NOTE — PROGRESS NOTES
Occupational Therapy    Evaluation/Treatment    Patient Name: Chris Chance  MRN: [id number]  : 1980  Today's Date: 10/20/23  Time Calculation  Start Time: 954  Stop Time: 8  Time Calculation (min): 34 min       Assessment:  OT Assessment: Patient would benefit from further OT to address ADL's and functional transfers/mobility due to decline in baselline funtion.   Prognosis: Good  Evaluation/Treatment Tolerance: Patient limited by fatigue, Patient limited by pain, SOB  End of Session Communication: Bedside nurse/Zay  End of Session Patient Position: Up in chair, Alarm off, not on at start of session    Plan:  Treatment Interventions: ADL retraining, Functional transfer training, Endurance training, Compensatory technique education, Patient/family training, Equipment evaluation/education  OT Frequency: 3 times per week  OT Discharge Recommendations: Moderate intensity level of continued care  Treatment Interventions: ADL retraining, Functional transfer training, Endurance training, Compensatory technique education, Patient/family training, Equipment evaluation/education  Subjective     Current Problem:  BLE pain/wounds, inability to care for self at home; (+)COVID about one week ago; fall off couch; required SNF placement  1. Unable to ambulate        2. Leukocytosis, unspecified type        3. Generalized weakness          Past Medical History:   Diagnosis Date    Abnormal levels of other serum enzymes     Elevated liver enzymes    Lateral epicondylitis, right elbow     Right tennis elbow    Other hypertrophic disorders of the skin     Skin tag    Personal history of other diseases of the nervous system and sense organs     History of serous otitis media    Personal history of other diseases of the respiratory system     History of bronchitis    Personal history of other specified conditions     History of abdominal pain    Plantar fascial fibromatosis     Plantar fasciitis    Unspecified asthma,  uncomplicated     Asthmatic bronchitis   Gout, morbid obesity    General:      General  Reason for Referral: ADL, safety  Referred By: Anibal Frazier PA-C  Prior to Session Communication: Bedside nurse  Patient Position Received: Up in chair, Alarm off, not on at start of session  General Comment: Patient seen in room 336 with family present; cooperative, ace wraps for BLE    Precautions:  Medical Precautions: Fall precautions, Infection precautions  Precautions Comment: tends to be impulsive    Pain Assessment  Pain Assessment: 0-10  Pain Score:  (over 9)  Pain Location: Leg  Pain Orientation: Left  Objective     Home Living:  Home Living Comments: prior to hospitalizations:  working until 9/26/2023. (this admit from [facility]; working with PT/OT)    Prior Function:  Level of [place]: Independent with ADLs and functional transfers (does own compensatory techniques; Indep ambulation without device)    ADL Current:  LE Dressing Deficit: Don/doff L sock, Don/doff R sock  Toileting Assistance with Device: Total  ADL Comments: anticipate carryover assist for other self-care areas such as underwear/pants, bathng, toileting.  Would benefit from trialing ADL adaptive equipment to facilitate indep and ease in performance.       Activity Tolerance:  Endurance:  (easily SOB, required rest breaks)    Bed Mobility/Transfers: Transfers  Transfer: Yes  Transfer 1  Transfer From 1: Sit to, Stand to  Transfer to 1: Bed  Transfer Device 1: Gait belt, Walker  Transfer Level of Assistance 1: Contact guard  Trials/Comments 1: Ambulation:  CGA using wheeled walker    Balance:   Sitting  Good overall   Standing Good (-)/Fair (+)      Sensation:  Light Touch: No apparent deficits      Extremities: RUE   RUE : Within Functional Limits and LUE   LUE: Within Functional Limits    Outcome Measures: [facility] Daily Activity  Putting on and taking off regular lower body clothing: Total  Bathing (including washing, rinsing, drying):  A lot  Putting on and taking off regular upper body clothing: A little  Toileting, which includes using toilet, bedpan or urinal: A lot  Taking care of personal grooming such as brushing teeth: A little  Eating Meals: None  Daily Activity - Total Score: 15    Goals:  Encounter Problems        OT Goals       Patient with complete lower body bathing/dressing and toileting with minimal assist using assistive techiques/adaptive equipment as needed  (Progressing)       Start:  10/20/23    Expected End:  11/03/23            Patient will perform bed mobility and functional transfers independently: bed,chair, commode using DME as needed (Progressing)       Start:  10/20/23    Expected End:  11/03/23            apply energy conservation/diaphragmatic breathing techniques to ADL's and functional transfers with minimal cues  (Progressing)       Start:  10/20/23    Expected End:  11/03/23            Patient will tolerate standing for 8 mins. and show good (-) standing balance during ADL's and functional transfers/mobility (Progressing)       Start:  10/20/23    Expected End:  11/03/23

## 2023-10-20 NOTE — CONSULTS
Consults    Reason For Consult  Left lower extremity cellulitis    History Of Present Illness  Chris Chance is a 42 y.o. male presenting with left lower extremity pain.  He was just recently hospitalized and had his lower extremity ulcers debrided.  He was then discharged and was doing poorly at home so he came back to the hospital on 10/18/2023 on arrival he was afebrile.  He had a white blood count 11.8.  He was placed on ceftriaxone.  A recent wound culture grew multiple bacteria.  Since being in the hospital his white blood cell count has been climbing.  He says that he feels cold.     Past Medical History  He has a past medical history of Abnormal levels of other serum enzymes, Lateral epicondylitis, right elbow, Other hypertrophic disorders of the skin, Personal history of other diseases of the nervous system and sense organs, Personal history of other diseases of the respiratory system, Personal history of other specified conditions, Plantar fascial fibromatosis, and Unspecified asthma, uncomplicated.    Surgical History  He has a past surgical history that includes Other surgical history (09/22/2015).     Social History  He reports that he has never smoked. He has never used smokeless tobacco. He reports that he does not drink alcohol and does not use drugs.    Family History  No family history on file.     Allergies  Patient has no known allergies.    Review of Systems  He feels cold he denies any nausea vomiting or diarrhea     Physical Exam  General: no acute distress, sitting in recliner with feet on the ground  HEENT: pink pharynx  CVS: RRR  Resp: decreased breath sounds in bases  ABD: soft, NT, ND  EXT: no edema  Skin: both lower extremities are extensively wrapped       Last Recorded Vitals  /69 (BP Location: Right arm)   Pulse (!) 118   Temp 36.6 °C (97.9 °F) (Tympanic)   Resp 17   Wt (!) 181 kg (399 lb 0.5 oz)   SpO2 95%     Relevant Results  10/18/2023 white blood count  11.8    10/13/2023 wound culture showed Enterobacter sensitive to meropenem; Acinetobacter sensitive to Bactrim     Assessment/Plan   Bilateral lower extremity venous stasis with bilateral ulcers that are polymicrobially-infected.  I have personally and independently reviewed and interpreted his laboratory test, imaging studies, and the documentation from other healthcare providers.  There is concern for a left lower extremity cellulitis due to the increased pain and drainage.  He has a worsening leukocytosis.  Cultures have shown a multidrug-resistant Acinetobacter and and Enterobacter.  I will try to optimize his antibiotics to include meropenem and Bactrim.  I will monitor for side effects from these antibiotics which can be rash, diarrhea, nephrotoxicity, and bone marrow suppression.    -Change antibiotics to meropenem and Bactrim  -Encourage lower extremity elevation  -Optimize local wound care and compression  -CBC tomorrow    Other issues:  #Hypertension  #Hyperlipidemia  #Asthma  #Obstructive sleep apnea    Jonah Hendrickson MD

## 2023-10-20 NOTE — PROGRESS NOTES
Physical Therapy    Physical Therapy Evaluation    Patient Name: Chris Chance  MRN: [id number]  Today's Date: 10/20/2023   Time Calculation  Start Time: 0955  Stop Time: 1027  Time Calculation (min): 32 min    Assessment/Plan   PT Assessment  PT Assessment Results: Decreased strength, Decreased range of motion, Decreased endurance, Impaired balance, Decreased mobility  Rehab Prognosis: Good  Evaluation/Treatment Tolerance: Patient limited by fatigue (limited by decreased activity tolerance)  Medical Staff Made Aware: Yes (notified Zay)  End of Session Communication: Bedside nurse  Assessment Comment:  (42 year old male admitted with LE wounds, LE pain and fall from couch, who presents to PT with the above mentioned impairments, impaired transfers, gait and stair negotition who would benefit from contined in house PT.)  End of Session Patient Position: Up in chair  IP OR SWING BED PT PLAN  Inpatient or Swing Bed: Inpatient  PT Plan  Treatment/Interventions: Bed mobility, Transfer training, Gait training, Stair training, Balance training, Strengthening, Therapeutic exercise, Therapeutic activity, Home exercise program  PT Plan: Skilled PT  PT Frequency: 3 times per week  PT Discharge Recommendations: Moderate intensity level of continued care  PT Recommended Transfer Status: Assist x1 (and bariatic walker, short in room distances)  PT - OK to Discharge: Yes      Subjective   General Visit Information:  General  Reason for Referral: PT Eval for: impaired mobility (Pt is a 43 y/o male admitted with (B) LE pain, wounds, inability to care for self and fall from couch.)  Referred By: Karin (PA-C)  Past Medical History Relevant to Rehab: morbid obesity, hypertension, hyperlipidemia, gout, asthma, and MICHAEL who presents with bilateral lower extremity pain and wounds  Family/Caregiver Present: Yes (mother and father)  Co-Treatment: OT  Co-Treatment Reason:  (to maximize progressive mobilization)  Prior to Session  Communication: Bedside nurse (Zay)  Patient Position Received: Up in chair  General Comment: Pt agereable to PT eval  Home Living:  Home Living  Type of Home:  (pt reports would return to his mother's house, home set up is for her home)  Lives With: Parent(s)  Home Adaptive Equipment: None  Home Layout: One level, Laundry in basement  Home Access: Stairs to enter with rails (3-4 steps)  Prior Level of Function:  Prior Function Per Pt/Caregiver Report  Level of [place]:  (pt reports prior to Sept 26, was working, driving, and ambulating independetly)  Ambulatory Assistance:  (since last hospital stay has been using a walker)  Vocational:  (delivery)  Precautions:  Precautions  Medical Precautions: Fall precautions, Cardiac precautions, Infection precautions (bariatric walker / equipment, contact precautions for LE wounds)  Vital Signs:  Vital Signs  Heart Rate:  (pre 116, during (ambulation 7 feet) 126, post 113)  SpO2:  (pre 94-96%, during 88% (post walk), post 94% on room air)    Objective   Pain:  Pain Assessment  Pain Score:  ((R) LE pain at wound site >9/10, (R) low back pain during ambulation, no comment on numerial rating for back pain)  Pain Interventions:  (deep breathing)  Cognition:  Cognition  Overall Cognitive Status:  (A&O to person, place ([facility]), month and year)  Safety/Judgement: Exceptions to WFL (some impulsivity and decreased saftey awareness noted (wanting gait belt removed to ambulate / use bathroom))    General Assessments:  General Observation  General Observation:  ((B) LE ace wrapped)     Activity Tolerance  Endurance: Tolerates less than 10 min exercise with changes in vital signs  Activity Tolerance Comments:  (decreased SpO2 and increased HR with limited ambulation distance)    Sensation  Light Touch: No apparent deficits    Strength  Strength Comments:  (MMT not tested in LE per pt preference (concern for LE wounds / pain); grossly pt (B) LE at least 3+/5 tested  functionally, pt able to complete full availble ROM and stand with 2WhW and no LE buckling)      Static Sitting Balance  Static Sitting-Level of Assistance:  (Supervision)    Static Standing Balance  Static Standing-Balance Support: Bilateral upper extremity supported (CGA)  Static Standing-Level of Assistance: Contact guard  Dynamic Standing Balance  Dynamic Standing-Balance Support: Bilateral upper extremity supported  Dynamic Standing-Comments:  (CGA)  Functional Assessments:            Transfers  Transfer: Yes  Transfer 1  Transfer From 1:  (sit <> stand: CGA to 2WhW)  Transfer Device 1: Gait belt    Ambulation/Gait Training  Ambulation/Gait Training Performed: Yes  Ambulation/Gait Training 1  Surface 1: Level tile  Device 1: Rolling walker  Gait Support Devices: Gait belt  Assistance 1: Contact guard  Quality of Gait 1: Wide base of support (decreased step legnth)    Stairs  Stairs: No      Outcome Measures:  [facility] Basic Mobility  Turning from your back to your side while in a flat bed without using bedrails: A little  Moving from lying on your back to sitting on the side of a flat bed without using bedrails: A little  Moving to and from bed to chair (including a wheelchair): A little  Standing up from a chair using your arms (e.g. wheelchair or bedside chair): A little  To walk in hospital room: A little  Climbing 3-5 steps with railing: A lot  Basic Mobility - Total Score: 17    Encounter Problems       Encounter Problems (Active)       Balance       STG - Maintains static standing balance with upper extremity support (Progressing)       Start:  10/20/23    Expected End:  11/10/23       And modified independence with LRAD            Mobility       STG - Patient will ambulate (Progressing)       Start:  10/20/23    Expected End:  11/10/23       >/= 100 feet with supervision, adaptive vital sign response and RPE </= 13/20         Goal 1 (Progressing)       Start:  10/20/23    Expected End:  11/10/23       Pt  will perform >/= 3 steps with 1 HR and CGA              Transfers       STG - Transfer from bed to chair (Progressing)       Start:  10/20/23    Expected End:  11/10/23       With modified independence and LRAD         STG - Patient will perform bed mobility (Progressing)       Start:  10/20/23    Expected End:  11/10/23       With modified independence         STG - Patient will transfer sit to and from stand (Progressing)       Start:  10/20/23    Expected End:  11/10/23                   Education Documentation  Precautions, taught by Fatemeh Bess, PT at 10/20/2023  1:12 PM.  Learner: Family, Patient  Readiness: Acceptance  Method: Explanation  Response: Verbalizes Understanding, Needs Reinforcement    Home Exercise Program, taught by Fatemeh Bess PT at 10/20/2023  1:12 PM.  Learner: Family, Patient  Readiness: Acceptance  Method: Explanation  Response: Verbalizes Understanding, Needs Reinforcement    Mobility Training, taught by Fatemeh Bess, PT at 10/20/2023  1:12 PM.  Learner: Family, Patient  Readiness: Acceptance  Method: Explanation  Response: Verbalizes Understanding, Needs Reinforcement    Education Comments  No comments found.

## 2023-10-20 NOTE — CONSULTS
PODIATRY CONSULT NOTE    SERVICE DATE: 10/20/2023   SERVICE TIME:  9:41 AM     REASON FOR CONSULT: Circumferential low extremity wounds, b/l  REQUESTING PHYSICIAN: Dr. Smalls  PRIMARY CARE PHYSICIAN: Erik Avila DO    Subjective   HPI:  Mr. Chance is a 42 y.o. male with PMH of morbid obesity, hypertension, hyperlipidemia, gout, asthma, and MICHAEL who presents with circumferential bilateral lower extremity pain and wounds. Patient presents today from home after being discharged from a nursing home due to his insurance running out. Patient reports inability to care for himself at home after discharge and pursues SNF placement after this hospital visit due to this inability. Patient had recent surgical debridement with misonix to debride firbous tissue from b/l wounds (DOS 9/29/23). Recent Tissue/Wound culture on 10/13/2023 grew multiple MDRO's including abundant Enterobacter clocae complex & Moderate Acinetobacter calcoaceticus-baumannii complex.   Patient endorsed being released from the nursing home yesterday to his home. Patient reports   difficulty getting up the stairs into the house and states that there was no new bigger bed and also no commode and argues that the house was not prepared for his arrival.  Patient also endorses that  he had a fall last night where he slid out of his couch onto the floor.  Denies any new pain or injuries. Denies any LOC or hitting his head. Patient denies any constitutional symptoms. No other pedal complaints.        ER course: On admission, patient is afebrile, hypertensive with a blood pressure 158/91, otherwise hemodynamically stable with SPO2 97% on room air.  Glucose 105, albumin 2.9, AST 56, total protein 5.9, magnesium 1.33.  WBC 14.1 with left shift, hemoglobin 9.0/hematocrit 30.7. Recent Tissue/Wound culture on 10/13/2023 grew multiple MDRO's including abundant Enterobacter clocae complex & Moderate Acinetobacter calcoaceticus-baumannii complex.  Patient  given Percocet tablet in the ED.    Podiatry was consulted for B/L lower extremity wounds. .    ROS: 10-point review of systems was performed and is otherwise negative except as noted in HPI.  PMH: Reviewed/documented below.  PSH:  Noncontributory except per HPI   FH: Reviewed and noncontributory   SOCIAL:  Reviewed/documented below.  ALLERGIES: Reviewed/documented below.  MEDS: Reviewed/documented below.  VS: Reviewed/documented below.    Past Medical History:   Diagnosis Date    Abnormal levels of other serum enzymes     Elevated liver enzymes    Lateral epicondylitis, right elbow     Right tennis elbow    Other hypertrophic disorders of the skin     Skin tag    Personal history of other diseases of the nervous system and sense organs     History of serous otitis media    Personal history of other diseases of the respiratory system     History of bronchitis    Personal history of other specified conditions     History of abdominal pain    Plantar fascial fibromatosis     Plantar fasciitis    Unspecified asthma, uncomplicated     Asthmatic bronchitis     Past Surgical History:   Procedure Laterality Date    OTHER SURGICAL HISTORY  09/22/2015    History Of Prior Surgery     No family history on file.  Social History     Tobacco Use    Smoking status: Never    Smokeless tobacco: Never   Substance Use Topics    Alcohol use: Never    Drug use: Never      Medications Prior to Admission   Medication Sig Dispense Refill Last Dose    Symbicort 160-4.5 mcg/actuation inhaler Inhale 2 puffs 2 times a day.   10/18/2023    acetaminophen (Tylenol) 325 mg tablet Take 2 tablets (650 mg) by mouth every 4 hours if needed for fever (temp greater than 38.0 C) (pain). 30 tablet 0 10/18/2023    albuterol 90 mcg/actuation inhaler Inhale 2 puffs every 6 hours if needed for wheezing or shortness of breath. 18 g 11 n/a    docusate sodium (Colace) 100 mg capsule Take 1 capsule (100 mg) by mouth 2 times a day as needed for constipation.    Unknown    lactobacillus acidophilus capsule Take 1 capsule by mouth once daily.   10/18/2023    lactobacillus acidophilus tablet tablet Take 1 tablet by mouth once daily. Do not start before October 6, 2023.       metoprolol succinate XL (Toprol-XL) 25 mg 24 hr tablet Take 1 tablet (25 mg) by mouth once daily. Do not crush or chew. Do not start before October 3, 2023.       metoprolol succinate XL (Toprol-XL) 50 mg 24 hr tablet Take 1 tablet (50 mg) by mouth once daily. Do not crush or chew. Do not start before October 4, 2023. (Patient taking differently: Take 1 tablet (50 mg) by mouth once daily in the evening. Do not crush or chew.)   10/18/2023    nystatin (Mycostatin) 100,000 unit/gram powder Apply topically 2 times a day.   10/18/2023    oxyCODONE (Roxicodone) 5 mg immediate release tablet Take 1 tablet (5 mg) by mouth every 4 hours if needed for moderate pain (4 - 6). 15 tablet 0 10/19/2023    polyethylene glycol (Glycolax, Miralax) 17 gram packet Take 17 g by mouth once daily. Do not start before October 6, 2023.   10/18/2023    simethicone (Mylicon) 80 mg chewable tablet Chew 1 tablet (80 mg) 3 times a day as needed (gas). 30 tablet 0 n/a    sulfamethoxazole-trimethoprim (Bactrim DS) 800-160 mg tablet Take 1 tablet by mouth 2 times a day.   10/19/2023    vancomycin (Vancocin) 125 mg capsule Take 1 capsule (125 mg) by mouth 4 times a day.       vancomycin (Vancocin) 125 mg capsule Take 1 capsule (125 mg) by mouth 4 times a day.           Medications:  Scheduled Meds: enoxaparin, 60 mg, subcutaneous, q12h BILLY  fluticasone furoate-vilanteroL, 1 puff, inhalation, Daily  lactobacillus acidophilus, 1 tablet, oral, Daily  magnesium oxide, 400 mg, oral, Daily  magnesium sulfate, 2 g, intravenous, Once  metoprolol succinate XL, 50 mg, oral, q PM  nystatin, , Topical, BID  polyethylene glycol, 17 g, oral, Daily  [Held by provider] sulfamethoxazole-trimethoprim, 1 tablet, oral, BID      Continuous Infusions:    PRN  Meds: PRN medications: acetaminophen, albuterol, docusate sodium, oxyCODONE, simethicone    Allergies as of 10/19/2023    (No Known Allergies)            Objective   PHYSICAL EXAM:  Physical Exam Performed:  Vitals:    10/20/23 0720   BP: 129/59   Pulse: 103   Resp: 18   Temp: 36.2 °C (97.2 °F)   SpO2:      Body mass index is 62.5 kg/m².    Patient is AOx3 and in no acute distress. Patient is alert and cooperative. Sitting comfortably in bed with dressing clean, dry and intact. Heel off-loading boots in place B/L.    Vascular: Faintly palpable DP/PT pulses B/L. Moderate pitting edema noted B/L. Hair growth absent B/L. CFT<5 to B/L hallux. Temperature is warm to cool from tibial tuberosity to distal digits B/L. No lymphatic streaking noted B/L.    Musculoskeletal: Gross active and passive ROM intact to age and activity level. Moves all extremities spontaneously. No pain to palpation at feet B/L.     Neurological: Intact light touch sensation B/L. Pain stimuli intact B/L. Denies any numbness, burning or tingling.    Dermatologic: Nails 1-5 are thickened, elongated, discolored with subungual debris B/L. Skin appears diffusely xerotic with some scaling B/L. Web spaces 1-4 B/L are clean, dry and intact.  No hyperkeratotic tissue noted B/L.     Wound:   Measurements: Right Lower Extremity: 7.5 cm x 9.2cm x 0.1cm  Mixed wound base of Mixed fibro granular tissue.   Mild serosanguinous drainage with fibrotic slough.   None mellisa-wound maceration.   Moderate mellisa-wound erythema.   Minimal evidence of ascending cellulitis or lymphangitis.  None palpable fluctuance. Minimal malodor. Mild increased warmth.   None probe to bone or deep structures within the wound base.   None tunneling or tracking.   None undermining. Skin edges irregular but intact.    Measurements: Left Lower Extremity  23 cm x 14.2 cm x 0.1 cm  Mixed wound base of Fibro granular tissue.   Significant serosanguinous drainage when compared to the right leg  with  fibrotic slough.   Mild mellisa-wound maceration.   Moderate mellisa-wound erythema.   Minimal evidence of ascending cellulitis or lymphangitis.  None palpable fluctuance. Mild malodor. None increased warmth.   None probe to bone or deep structures within the wound base.   None tunneling or tracking.   None undermining. Skin edges irregular but intact.    LABS:   Results for orders placed or performed during the hospital encounter of 10/19/23 (from the past 24 hour(s))   CBC and Auto Differential   Result Value Ref Range    WBC 14.1 (H) 4.4 - 11.3 x10*3/uL    nRBC 0.4 (H) 0.0 - 0.0 /100 WBCs    RBC 2.82 (L) 4.50 - 5.90 x10*6/uL    Hemoglobin 9.0 (L) 13.5 - 17.5 g/dL    Hematocrit 30.7 (L) 41.0 - 52.0 %     (H) 80 - 100 fL    MCH 31.9 26.0 - 34.0 pg    MCHC 29.3 (L) 32.0 - 36.0 g/dL    RDW 15.9 (H) 11.5 - 14.5 %    Platelets 287 150 - 450 x10*3/uL    MPV 8.6 7.5 - 11.5 fL    Immature Granulocytes %, Automated 9.0 (H) 0.0 - 0.9 %    Immature Granulocytes Absolute, Automated 1.26 (H) 0.00 - 0.70 x10*3/uL   Comprehensive metabolic panel   Result Value Ref Range    Glucose 105 (H) 74 - 99 mg/dL    Sodium 138 136 - 145 mmol/L    Potassium 4.8 3.5 - 5.3 mmol/L    Chloride 99 98 - 107 mmol/L    Bicarbonate 31 21 - 32 mmol/L    Anion Gap 13 10 - 20 mmol/L    Urea Nitrogen 18 6 - 23 mg/dL    Creatinine 1.13 0.50 - 1.30 mg/dL    eGFR 83 >60 mL/min/1.73m*2    Calcium 9.8 8.6 - 10.3 mg/dL    Albumin 2.9 (L) 3.4 - 5.0 g/dL    Alkaline Phosphatase 98 33 - 120 U/L    Total Protein 5.9 (L) 6.4 - 8.2 g/dL    AST 56 (H) 9 - 39 U/L    Bilirubin, Total 0.5 0.0 - 1.2 mg/dL    ALT 28 10 - 52 U/L   Magnesium   Result Value Ref Range    Magnesium 1.33 (L) 1.60 - 2.40 mg/dL   Manual Differential   Result Value Ref Range    Neutrophils %, Manual 80.0 40.0 - 80.0 %    Bands %, Manual 1.0 0.0 - 5.0 %    Lymphocytes %, Manual 8.0 13.0 - 44.0 %    Monocytes %, Manual 4.0 2.0 - 10.0 %    Eosinophils %, Manual 1.0 0.0 - 6.0 %    Basophils %,  Manual 0.0 0.0 - 2.0 %    Metamyelocytes %, Manual 6.0 0.0 - 0.0 %    Seg Neutrophils Absolute, Manual 11.28 (H) 1.20 - 7.00 x10*3/uL    Bands Absolute, Manual 0.14 0.00 - 0.70 x10*3/uL    Lymphocytes Absolute, Manual 1.13 (L) 1.20 - 4.80 x10*3/uL    Monocytes Absolute, Manual 0.56 0.10 - 1.00 x10*3/uL    Eosinophils Absolute, Manual 0.14 0.00 - 0.70 x10*3/uL    Basophils Absolute, Manual 0.00 0.00 - 0.10 x10*3/uL    Metamyelocytes Absolute, Manual 0.85 0.00 - 0.00 x10*3/uL    Total Cells Counted 100     Neutrophils Absolute, Manual 11.42 (H) 1.20 - 7.70 x10*3/uL    RBC Morphology See Below     Polychromasia Mild     Teardrop Cells Few    C-reactive protein   Result Value Ref Range    C-Reactive Protein 1.66 (H) <1.00 mg/dL   Sedimentation rate, automated   Result Value Ref Range    Sedimentation Rate 44 (H) 0 - 15 mm/h   Type And Screen   Result Value Ref Range    ABO TYPE B     Rh TYPE POS     ANTIBODY SCREEN NEG    Blood Culture    Specimen: Peripheral Venipuncture; Blood culture   Result Value Ref Range    Blood Culture Loaded on Instrument - Culture in progress    Blood Culture    Specimen: Peripheral Venipuncture; Blood culture   Result Value Ref Range    Blood Culture Loaded on Instrument - Culture in progress    Urinalysis with Reflex Microscopic   Result Value Ref Range    Color, Urine Yellow Straw, Yellow    Appearance, Urine Clear Clear    Specific Gravity, Urine 1.008 1.005 - 1.035    pH, Urine 8.0 5.0, 5.5, 6.0, 6.5, 7.0, 7.5, 8.0    Protein, Urine NEGATIVE NEGATIVE mg/dL    Glucose, Urine NEGATIVE NEGATIVE mg/dL    Blood, Urine NEGATIVE NEGATIVE    Ketones, Urine NEGATIVE NEGATIVE mg/dL    Bilirubin, Urine NEGATIVE NEGATIVE    Urobilinogen, Urine <2.0 <2.0 mg/dL    Nitrite, Urine NEGATIVE NEGATIVE    Leukocyte Esterase, Urine MODERATE (2+) (A) NEGATIVE   Microscopic Only, Urine   Result Value Ref Range    WBC, Urine 1-5 1-5, NONE /HPF    RBC, Urine NONE NONE, 1-2, 3-5 /HPF    Bacteria, Urine 1+ (A)  NONE SEEN /HPF   CBC   Result Value Ref Range    WBC 16.6 (H) 4.4 - 11.3 x10*3/uL    nRBC 0.4 (H) 0.0 - 0.0 /100 WBCs    RBC 2.69 (L) 4.50 - 5.90 x10*6/uL    Hemoglobin 8.5 (L) 13.5 - 17.5 g/dL    Hematocrit 30.0 (L) 41.0 - 52.0 %     (H) 80 - 100 fL    MCH 31.6 26.0 - 34.0 pg    MCHC 28.3 (L) 32.0 - 36.0 g/dL    RDW 15.9 (H) 11.5 - 14.5 %    Platelets 277 150 - 450 x10*3/uL    MPV 8.6 7.5 - 11.5 fL   Basic metabolic panel   Result Value Ref Range    Glucose 81 74 - 99 mg/dL    Sodium 137 136 - 145 mmol/L    Potassium 4.2 3.5 - 5.3 mmol/L    Chloride 98 98 - 107 mmol/L    Bicarbonate 32 21 - 32 mmol/L    Anion Gap 11 10 - 20 mmol/L    Urea Nitrogen 15 6 - 23 mg/dL    Creatinine 1.11 0.50 - 1.30 mg/dL    eGFR 85 >60 mL/min/1.73m*2    Calcium 9.2 8.6 - 10.3 mg/dL   Magnesium   Result Value Ref Range    Magnesium 1.40 (L) 1.60 - 2.40 mg/dL      Lab Results   Component Value Date    HGBA1C 5.3 09/29/2023      Lab Results   Component Value Date    CRP 1.66 (H) 10/19/2023      Lab Results   Component Value Date    SEDRATE 44 (H) 10/19/2023        Results from last 7 days   Lab Units 10/20/23  0738   WBC AUTO x10*3/uL 16.6*   RBC AUTO x10*6/uL 2.69*   HEMOGLOBIN g/dL 8.5*   HEMATOCRIT % 30.0*     Results from last 7 days   Lab Units 10/20/23  0738 10/19/23  1128   SODIUM mmol/L 137 138   POTASSIUM mmol/L 4.2 4.8   CHLORIDE mmol/L 98 99   CO2 mmol/L 32 31   BUN mg/dL 15 18   CREATININE mg/dL 1.11 1.13   CALCIUM mg/dL 9.2 9.8   MAGNESIUM mg/dL 1.40* 1.33*   BILIRUBIN TOTAL mg/dL  --  0.5   ALT U/L  --  28   AST U/L  --  56*     Results from last 7 days   Lab Units 10/19/23  1751   COLOR U  Yellow   APPEARANCE U  Clear   PH U  8.0   SPEC GRAV UR  1.008   PROTEIN U mg/dL NEGATIVE   BLOOD UR  NEGATIVE   NITRITE U  NEGATIVE   WBC UR /HPF 1-5   BACTERIA UR /HPF 1+*       IMAGING REVIEW:  ECG 12 lead    Result Date: 10/7/2023  Sinus tachycardia Abnormal R-wave progression, early transition Abnormal T, consider ischemia,  anterior leads Confirmed by Ramicone, James ([id number]) on 10/7/2023 7:33:46 PM    XR chest 1 view    Result Date: 10/5/2023  Interpreted By:  Brenda Bonilla, STUDY: XR CHEST 1 VIEW;  10/5/2023 11:14 am   INDICATION: Signs/Symptoms:leukocytosis, pna.   COMPARISON: CT chest 10/02/2023, chest radiograph 09/27/2023   ACCESSION NUMBER(S): [id number]   ORDERING CLINICIAN: RAMON GALINDO   FINDINGS: CARDIOMEDIASTINAL SILHOUETTE: Cardiomediastinal silhouette is normal in size and configuration.     LUNGS: Lungs are clear.   The patient's known bilateral patchy ground-glass opacities as seen on CT angiogram of the chest dated 10/02/2023 are not appreciated radiographically.   ABDOMEN: No remarkable upper abdominal findings.     BONES: No acute osseous changes.       No acute cardiopulmonary process. The patient's known bilateral patchy ground-glass opacity seen on CT angiogram of the chest dated 10/02/2023 are not appreciated radiographically.   MACRO: None   Signed by: Brenda Bonilla 10/5/2023 12:27 PM Dictation workstation:   [id number]    CT angio chest for pulmonary embolism    Result Date: 10/3/2023  Interpreted By:  Jovan Nguyễn, STUDY: CT ANGIO CHEST FOR PULMONARY EMBOLISM;  10/2/2023 7:12 pm   INDICATION: Signs/Symptoms:SOB/TACHYCARDIA-CONCERN FOR PULM EMBOLISM.   COMPARISON: None.   ACCESSION NUMBER(S): [id number]   ORDERING CLINICIAN: GIORGIO CARTAGENA   TECHNIQUE: Helical data acquisition of the chest was obtained after IV injection of  75 ml of  Omnipaque 350. Images were reformatted in axial, coronal, and sagittal planes. 3D reconstructed MIPS volumetric images were also generated at an independent workstation and reviewed.   FINDINGS: POTENTIAL LIMITATIONS OF THE STUDY: Image detail is limited including peripheral pulmonary artery branches due to patient habitus.   HEART AND VESSELS: No discrete filling defects within the main pulmonary artery or its branches considering limitations.   The thoracic aorta  is of normal course and caliber without aneurysm, dissection or significant atherosclerotic calcification.   No coronary artery calcifications are seen. The study is not optimized for evaluation of coronary arteries.   The cardiac chambers are not enlarged.   MEDIASTINUM AND BENNIE, LOWER NECK AND AXILLA: The visualized thyroid gland is within normal limits, the esophagus appears unremarkable.   No evidence of thoracic lymphadenopathy by CT criteria.   LUNGS AND AIRWAYS:   There are multiple scattered areas of ground-glass airspace disease. Primarily this involves the posterior segments of the upper lobes, superior segments of the lower lobes greater on the left and lingula, and is most likely due to multifocal pneumonia. This also probably accounts for several more nodular foci such as at the right lung on image 157 of series 60 measuring approximately 5 mm, and follow-up would be recommended if there are risk factors for malignancy. No pleural effusion.   UPPER ABDOMEN AND OTHER FINDINGS: Minimal compression deformity of the T8 vertebral body,  age indeterminate but probably remote or chronic as there is no definite acute fracture line, associated paraspinal hematoma or edema.   CHEST WALL AND OSSEOUS STRUCTURES: There are no suspicious osseous lesions.       1. No evidence of pulmonary emboli, dissection or aneurysm given limitations. 2. Probable multifocal pneumonia as described.   Signed by: Jovan Nguyễn 10/3/2023 8:17 AM Dictation workstation:   [id number]    Transthoracic Echo (TTE) Complete    Result Date: 9/30/2023    [facility], [address] [place] [address] [id number] [phone] and                                 Fax [phone] TRANSTHORACIC ECHOCARDIOGRAM REPORT  Patient Name:      TYRON SUGAR    Reading Physician:    Anahi Persaud DO Study Date:        9/30/2023            Ordering Provider:    Anahi BECKWITH                                                                OSIEL MRN/PID:           [id number]             Fellow: Accession#:        [id number]         Nurse: Date of Birth/Age: 1980 / 42 years Sonographer:          John Smith RDCS Gender:            M                    Additional Staff: Height:            170.18               Admit Date:           9/28/2023 Weight:            171.46               Admission Status:     Inpatient -                                                               Routine BSA:               2.65 m2              Encounter#:           [id number]                                         Department Location:  [facility] Blood Pressure: 108 /55 mmHg Study Type:    TRANSTHORACIC ECHO (TTE) COMPLETE Diagnosis/ICD: Tachycardia, unspecified-R00.0 Indication:    tachycardia Patient History: BMI:               Obese >30 Pertinent History: HTN, Hyperlipidemia and LE Edema. MICHAEL. Study Detail: The following Echo studies were performed: 2D, M-Mode, Doppler and               color flow. Technically challenging study due to body habitus.  PHYSICIAN INTERPRETATION: Left Ventricle: The left ventricular systolic function is normal, with an estimated ejection fraction of 60-65%. There are no regional wall motion abnormalities. The left ventricular cavity size is normal. Left ventricular diastolic filling was indeterminate. Left Atrium: The left atrium is mildly dilated. Right Ventricle: The right ventricle is upper limits of normal in size. There is normal right ventricular global systolic function. Right Atrium: The right atrium is normal in size. Aortic Valve: The aortic valve is probably trileaflet. There is mild aortic valve thickening. There is no evidence of aortic valve regurgitation. The peak instantaneous gradient of the aortic valve is 14.0 mmHg. The mean gradient of the aortic valve is 8.0 mmHg. Mitral Valve: The mitral valve is mildly thickened. There is trace mitral valve  regurgitation. Tricuspid Valve: The tricuspid valve is structurally normal. There is trace to mild tricuspid regurgitation. The Doppler estimated RVSP is moderately elevated at 47.1 mmHg. Pulmonic Valve: The pulmonic valve is not well visualized. The pulmonic valve regurgitation was not well visualized. Pericardium: There is a trivial pericardial effusion inferolateral. There is no evidence of cardiac tamponade. Aorta: The aortic root is normal. Pulmonary Artery: The pulmonary artery is not well visualized. Systemic Veins: The inferior vena cava was not well visualized.  CONCLUSIONS:  1. Left ventricular systolic function is normal with a 60-65% estimated ejection fraction.  2. There is no evidence of cardiac tamponade.  3. Moderately elevated right ventricular systolic pressure.  4. The pulmonary artery is not well visualized.  5. Pt is tachycardic. QUANTITATIVE DATA SUMMARY: 2D MEASUREMENTS:                          Normal Ranges: LAs:           4.40 cm   (2.7-4.0cm) IVSd:          0.91 cm   (0.6-1.1cm) LVPWd:         0.91 cm   (0.6-1.1cm) LVIDd:         5.58 cm   (3.9-5.9cm) LVIDs:         3.71 cm LV Mass Index: 72.9 g/m2 LV % FS        33.5 % LA VOLUME:                             Normal Ranges: LA Volume Index: 26.0 ml/m2 M-MODE MEASUREMENTS:                  Normal Ranges: Ao Root: 3.30 cm (2.0-3.7cm) LAs:     4.40 cm (2.7-4.0cm) AORTA MEASUREMENTS:                    Normal Ranges: Asc Ao, d: 3.30 cm (2.1-3.4cm) LV SYSTOLIC FUNCTION BY 2D PLANIMETRY (MOD):                     Normal Ranges: EF-A4C View: 69.3 % (>=55%) EF-A2C View: 56.7 % EF-Biplane:  63.3 % LV DIASTOLIC FUNCTION:                     Normal Ranges: MV Peak E: 1.04 m/s (0.7-1.2 m/s) MITRAL VALVE:                 Normal Ranges: MV DT: 211 msec (150-240msec) AORTIC VALVE:                                    Normal Ranges: AoV Vmax:                1.87 m/s  (<=1.7m/s) AoV Peak P.0 mmHg (<20mmHg) AoV Mean P.0 mmHg   (1.7-11.5mmHg) LVOT Max Dean:            1.45 m/s  (<=1.1m/s) AoV VTI:                 25.90 cm  (18-25cm) LVOT VTI:                21.40 cm LVOT Diameter:           2.60 cm   (1.8-2.4cm) AoV Area, VTI:           4.39 cm2  (2.5-5.5cm2) AoV Area,Vmax:           4.12 cm2  (2.5-4.5cm2) AoV Dimensionless Index: 0.83  RIGHT VENTRICLE: RV Basal 3.31 cm RV Mid   2.19 cm RV Major 7.6 cm TAPSE:   18.9 mm RV s'    0.23 m/s TRICUSPID VALVE/RVSP:                             Normal Ranges: Peak TR Velocity: 3.32 m/s RV Syst Pressure: 47.1 mmHg (< 30mmHg) PULMONIC VALVE:                      Normal Ranges: PV Max Dean: 1.3 m/s  (0.6-0.9m/s) PV Max P.1 mmHg  [id number] Rudolph Persaud DO Electronically signed on 2023 at 5:06:46 PM  ** Final **     PVR (Arterial Physiologic) [facility]    Result Date: 2023  [place] [address] Tel [phone] and Fax [phone] Vascular Lab Report PVR [facility] Patient Name:     TYRON WOOTEN Reading Physician:   [id number] Jenny Alegre MD, RPVI Study Date:       2023         Referring Physician: TATO SILVER MRN/PID:          [id number]          PCP: Accession/Order#: [id number]         CC Report to: YOB: 1980         Technologist:        Freida Lacey RVT Gender:           M                 Technologist 2: Admission Status: Inpatient         Location Performed:  [facility] Diagnosis/ICD: I70.248-Atherosclerosis of native arteries of left leg with ulceration of other part of lower left leg; I70.238-Atherosclerosis of native arteries of right leg with ulceration of other part of lower right leg Procedure/CPT: 62143 Peripheral artery [facility] Only-[id number] CONCLUSIONS: Right Lower PVR: No evidence of arterial occlusive disease in the right lower extremity at rest. Normal digital perfusion noted. Triphasic flow is noted in the right dorsalis pedis artery. Unable to insonate PT due to wounds and dressings. Left Lower PVR: No evidence of  arterial occlusive disease in the left lower extremity at rest. Normal digital perfusion noted. Triphasic flow is noted in the left dorsalis pedis artery. Unable to insonate PT due to wounds and dressings. Imaging & Doppler Findings: RIGHT Lower PVR              Pressures Ratios Right Dorsalis Pedis (Ankle) 140 mmHg  1.19 Right Digit (Great Toe)      101 mmHg  0.86 LEFT Lower PVR              Pressures Ratios Left Dorsalis Pedis (Ankle) 149 mmHg  1.26 Left Digit (Great Toe)      96 mmHg   0.81 Right     Left Brachial Pressure 118 mmHg 118 mmHg [id number] Jenny Alegre MD, RPVI Electronically signed by [id number] Jenny Alegre MD, ZENY on 9/29/2023 at 4:19:40 PM  Final      Venous Duplex Ultrasound DVT    Result Date: 9/27/2023  [place] [address] Tel [phone] and Fax [phone] Vascular Lab Report Lower Venous Duplex Ultrasound Patient Name:     TYRON SUGAR Reading Physician:   [id number] Nancy Parish MD Study Date:       9/27/2023         Referring Physician: ARA ALVAREZ MRN/PID:          [id number]          PCP: Accession/Order#: [id number]         CC Report to: YOB: 1980         Technologist:        Freida Lacey RVT Gender:           M                 Technologist 2: Admission Status: Emergency         Location Performed:  [facility] Diagnosis/ICD: M79.89-Other specified soft tissue disorders Procedure/CPT: 58389 Peripheral venous duplex scan for DVT complete-[id number] CONCLUSIONS: Right Lower Venous: No evidence of acute deep vein thrombus visualized in the right lower extremity. Cannot rule out thrombus in non-visualized peroneal and posterior tibial veins due to open wounds. Left Lower Venous: No evidence of acute deep vein thrombus visualized in the left lower extremity. Cannot rule out thrombus in non-visualized peroneal and posterior tibial veins due to open wounds. Imaging & Doppler Findings: Right                 Compressible Thrombus         Flow Distal External Iliac     Yes        None   Spontaneous/Phasic CFV                       Yes        None   Spontaneous/Phasic PFV                       Yes        None FV Proximal               Yes        None   Spontaneous/Phasic FV Mid                    Yes        None FV Distal                 Yes        None Popliteal                 Yes        None   Spontaneous/Phasic Left                  Compress Thrombus        Flow Distal External Iliac   Yes      None   Spontaneous/Phasic CFV                     Yes      None   Spontaneous/Phasic PFV                     Yes      None FV Proximal             Yes      None   Spontaneous/Phasic FV Mid                  Yes      None FV Distal               Yes      None Popliteal               Yes      None   Spontaneous/Phasic [id number] Nancy Parish MD Electronically signed by [id number] Nancy Parish MD on 9/27/2023 at 3:00:23 PM  Final      XR chest 1 view    Result Date: 9/27/2023  Interpreted By:  NAKUL SRIVASTAVA MD MRN: [id number] Patient Name: TYRON WOOTEN  STUDY: CHEST 1 VIEW  9/27/2023 11:14 am  INDICATION: SOB  COMPARISON: 12/16/2014  ACCESSION NUMBER(S): [id number]  ORDERING CLINICIAN: ARA ALVAREZ  TECHNIQUE: A single AP portable radiograph of the chest was obtained.  FINDINGS: No focal infiltrate, pleural effusion or pneumothorax is identified. The cardiac silhouette is within normal limits for size.      No focal infiltrate or pneumothorax is identified.  MACRO: None.            Assessment/Plan   ASSESSMENT & PLAN:    #Circumferential B/L leg wounds   #HTN  #Asthma  #HLD  #MICHAEL    Plan:   - Patient was seen and evaluated; all findings were discussed and all questions were answered to patient's satisfaction.  - Patient wounds have improved since last visit from surgical debridement and  nursing home visit.  - Patient would benefit from SNF placement  - Charts, labs, vitals and imaging all reviewed.   - Labs: WBC: 16.6, CRP: 1.66, ESR: 44  - Blood culture:  Pending  - Abx: Per ID, would recommend abx regimen due to increased drainage and pain on Left side.  - Pain Regimen: Per Primary  - Bowel Regimen: Per Primary  - Wound care orders placed  - Dressed wounds today.   - Dressings: Adaptic, 4x4,s ABD, Krelix, ACE.  - Nursing staff is able to change/reinforce dressing if & as necessary until next day’s dressing change. Thank you.  - Podiatry will follow from afar.     Not is not final will discuss with Dr. Serina blanco for final signature.     DVT ppx: Lovenox  Weightbearing: WBAT  Discharge: SNF    Case to be discussed with attending, A&P above reflects a tentative plan. Please await for the final signature from the attending physician on service.    Yves Mason DPM PGY-2  Podiatric Medicine & Surgery  Please Hayley message me with any questions or concerns.            SIGNATURE: Yves Mason DPM PATIENT NAME: Chris Chance   DATE: October 20, 2023 MRN: [id number]   TIME:10:03 AM  CONTACT: Haiku message

## 2023-10-20 NOTE — PROGRESS NOTES
10/20/23 1604   Discharge Planning   Type of Post Acute Facility Services Long term care   Patient expects to be discharged to: ICF   Does the patient need discharge transport arranged? Yes   RoundTrip coordination needed? Yes     1600:  Notified by [facility] patient in need of ICF and preferences are 1. [facility] 2. [facility] 3. [facility].  Referrals submitted to the above facilities for review.

## 2023-10-20 NOTE — ED PROVIDER NOTES
HPI   Chief Complaint   Patient presents with   • PLACEMENT EVAL     PT BIBA FROM HOME FOR AN EVALUATION FOR PLACEMENT. PT RECENTLY GOT DISCHARGED FROM [facility] THE OTHER DAY POST LEFT LEG SURGERY. PT STATES THAT HE FEELS HE IS UNABLE TO CARE FOR HIMSELF AT HOME AND NEEDS ASSISTANCE. PT SLEPT ON RECLINER LAST NIGHT AND SLID OUT OF THE CHAIR ONTO THE FLOOR, THE CHAIR THEN FELL ON TOP OF PT. PT CURRENTLY LIVING WITH MOTHER/ FATHER. PT STATES THEY DO NOT HAVE A PROPER BED FOR HIM. PT AMBULATES WITH WALKER.        Patient presenting for evaluation of generalized weakness.  Patient notes last evening was sitting in a couch and leaned forward and fell the couch onto the ground.  Patient notes he scraped his right knee.  Patient denies pain at this time.  Patient has been ambulatory with a walker but notes having difficulty taking care of self at home.  Patient had a home health nurse visit today and recommend coming to the ED as his house is not equipped to take care of him at this time.  Patient notes ongoing wounds to the lower extremities.                        [place] Coma Scale Score: 15                  Patient History   Past Medical History:   Diagnosis Date   • Abnormal levels of other serum enzymes     Elevated liver enzymes   • Lateral epicondylitis, right elbow     Right tennis elbow   • Other hypertrophic disorders of the skin     Skin tag   • Personal history of other diseases of the nervous system and sense organs     History of serous otitis media   • Personal history of other diseases of the respiratory system     History of bronchitis   • Personal history of other specified conditions     History of abdominal pain   • Plantar fascial fibromatosis     Plantar fasciitis   • Unspecified asthma, uncomplicated     Asthmatic bronchitis     Past Surgical History:   Procedure Laterality Date   • OTHER SURGICAL HISTORY  09/22/2015    History Of Prior Surgery     No family history on file.  Social  History     Tobacco Use   • Smoking status: Never   • Smokeless tobacco: Never   Substance Use Topics   • Alcohol use: Never   • Drug use: Never       Physical Exam   ED Triage Vitals   Temp Heart Rate Resp BP   10/19/23 1120 10/19/23 1120 10/19/23 1120 10/19/23 1120   36.9 °C (98.4 °F) 92 20 (!) 158/91      SpO2 Temp Source Heart Rate Source Patient Position   10/19/23 1120 10/20/23 0720 10/19/23 1120 10/19/23 2155   97 % Temporal Monitor Sitting      BP Location FiO2 (%)     10/19/23 2155 --     Left arm        Physical Exam  Vitals and nursing note reviewed. Exam conducted with a chaperone present.   Constitutional:       Appearance: Normal appearance. He is obese.   HENT:      Head: Atraumatic.      Right Ear: External ear normal.      Left Ear: External ear normal.      Nose: Nose normal.      Mouth/Throat:      Mouth: Mucous membranes are moist.      Pharynx: Oropharynx is clear.   Eyes:      Extraocular Movements: Extraocular movements intact.      Pupils: Pupils are equal, round, and reactive to light.   Cardiovascular:      Rate and Rhythm: Normal rate and regular rhythm.      Pulses: Normal pulses.   Pulmonary:      Effort: Pulmonary effort is normal.      Breath sounds: Examination of the right-lower field reveals decreased breath sounds. Examination of the left-lower field reveals decreased breath sounds. Decreased breath sounds present.   Abdominal:      Palpations: Abdomen is soft.      Tenderness: There is no abdominal tenderness.   Musculoskeletal:         General: No tenderness or signs of injury. Normal range of motion.      Cervical back: Normal range of motion and neck supple. No rigidity or tenderness.      Right lower leg: Edema present.      Left lower leg: Edema present.   Skin:     General: Skin is warm and dry.      Findings: Erythema and wound present.          Neurological:      General: No focal deficit present.      Mental Status: He is alert and oriented to person, place, and time.  Mental status is at baseline.   Psychiatric:         Mood and Affect: Mood normal.         Behavior: Behavior normal.     ED Course & MDM   ED Course as of 10/20/23 1941   Thu Oct 19, 2023   1313 Discussed with admitting service including attending and nurse practitioner.  They agree with plan of admission this time.  Podiatry consulted. [JA]      ED Course User Index  [JA] Matrin Piedra DO         Diagnoses as of 10/20/23 1941   Unable to ambulate   Leukocytosis, unspecified type   Generalized weakness       Medical Decision Making  Patient presenting for evaluation of generalized weakness.  Patient notes he is have difficulty walking.  Patient states he is having increasing swelling in his legs.  Patient notes he fell off the couch landing on his right knee although there is no pain at this time.  Patient has slightly increased white count.  Discussed with admitting service as the patient cannot take care of himself at home.  Admitting service recommends consultation with podiatry and starting antibiotics based on the recommendation.  Podiatry consulted.        Procedure  Procedures     Martin Piedra DO  10/20/23 1944

## 2023-10-21 LAB
ANION GAP SERPL CALC-SCNC: 13 MMOL/L (ref 10–20)
BUN SERPL-MCNC: 14 MG/DL (ref 6–23)
CALCIUM SERPL-MCNC: 8.6 MG/DL (ref 8.6–10.3)
CHLORIDE SERPL-SCNC: 100 MMOL/L (ref 98–107)
CO2 SERPL-SCNC: 28 MMOL/L (ref 21–32)
CREAT SERPL-MCNC: 1.02 MG/DL (ref 0.5–1.3)
ERYTHROCYTE [DISTWIDTH] IN BLOOD BY AUTOMATED COUNT: 15.9 % (ref 11.5–14.5)
GFR SERPL CREATININE-BSD FRML MDRD: >90 ML/MIN/1.73M*2
GLUCOSE SERPL-MCNC: 102 MG/DL (ref 74–99)
HCT VFR BLD AUTO: 24.4 % (ref 41–52)
HCT VFR BLD AUTO: 25.5 % (ref 41–52)
HGB BLD-MCNC: 7.4 G/DL (ref 13.5–17.5)
HGB BLD-MCNC: 7.7 G/DL (ref 13.5–17.5)
MAGNESIUM SERPL-MCNC: 1.61 MG/DL (ref 1.6–2.4)
MCH RBC QN AUTO: 32 PG (ref 26–34)
MCHC RBC AUTO-ENTMCNC: 30.3 G/DL (ref 32–36)
MCV RBC AUTO: 106 FL (ref 80–100)
NRBC BLD-RTO: 0 /100 WBCS (ref 0–0)
PLATELET # BLD AUTO: 202 X10*3/UL (ref 150–450)
PMV BLD AUTO: 8.9 FL (ref 7.5–11.5)
POTASSIUM SERPL-SCNC: 3.4 MMOL/L (ref 3.5–5.3)
RBC # BLD AUTO: 2.31 X10*6/UL (ref 4.5–5.9)
SODIUM SERPL-SCNC: 138 MMOL/L (ref 136–145)
WBC # BLD AUTO: 9.8 X10*3/UL (ref 4.4–11.3)

## 2023-10-21 PROCEDURE — 2500000004 HC RX 250 GENERAL PHARMACY W/ HCPCS (ALT 636 FOR OP/ED): Performed by: INTERNAL MEDICINE

## 2023-10-21 PROCEDURE — 85027 COMPLETE CBC AUTOMATED: CPT | Performed by: INTERNAL MEDICINE

## 2023-10-21 PROCEDURE — 2500000004 HC RX 250 GENERAL PHARMACY W/ HCPCS (ALT 636 FOR OP/ED): Performed by: PHYSICIAN ASSISTANT

## 2023-10-21 PROCEDURE — 85014 HEMATOCRIT: CPT

## 2023-10-21 PROCEDURE — 2500000001 HC RX 250 WO HCPCS SELF ADMINISTERED DRUGS (ALT 637 FOR MEDICARE OP): Performed by: PHYSICIAN ASSISTANT

## 2023-10-21 PROCEDURE — 36415 COLL VENOUS BLD VENIPUNCTURE: CPT

## 2023-10-21 PROCEDURE — 96372 THER/PROPH/DIAG INJ SC/IM: CPT | Performed by: PHYSICIAN ASSISTANT

## 2023-10-21 PROCEDURE — 36415 COLL VENOUS BLD VENIPUNCTURE: CPT | Performed by: INTERNAL MEDICINE

## 2023-10-21 PROCEDURE — 82374 ASSAY BLOOD CARBON DIOXIDE: CPT | Performed by: INTERNAL MEDICINE

## 2023-10-21 PROCEDURE — 2500000004 HC RX 250 GENERAL PHARMACY W/ HCPCS (ALT 636 FOR OP/ED)

## 2023-10-21 PROCEDURE — 1100000001 HC PRIVATE ROOM DAILY

## 2023-10-21 PROCEDURE — 2500000004 HC RX 250 GENERAL PHARMACY W/ HCPCS (ALT 636 FOR OP/ED): Performed by: NURSE PRACTITIONER

## 2023-10-21 PROCEDURE — 83735 ASSAY OF MAGNESIUM: CPT

## 2023-10-21 PROCEDURE — 2500000002 HC RX 250 W HCPCS SELF ADMINISTERED DRUGS (ALT 637 FOR MEDICARE OP, ALT 636 FOR OP/ED): Performed by: PHYSICIAN ASSISTANT

## 2023-10-21 PROCEDURE — 94640 AIRWAY INHALATION TREATMENT: CPT

## 2023-10-21 PROCEDURE — 99232 SBSQ HOSP IP/OBS MODERATE 35: CPT | Performed by: INTERNAL MEDICINE

## 2023-10-21 RX ORDER — POTASSIUM CHLORIDE 20 MEQ/1
20 TABLET, EXTENDED RELEASE ORAL ONCE
Status: COMPLETED | OUTPATIENT
Start: 2023-10-21 | End: 2023-10-21

## 2023-10-21 RX ORDER — MAGNESIUM SULFATE HEPTAHYDRATE 40 MG/ML
2 INJECTION, SOLUTION INTRAVENOUS ONCE
Status: COMPLETED | OUTPATIENT
Start: 2023-10-21 | End: 2023-10-21

## 2023-10-21 RX ADMIN — POTASSIUM CHLORIDE 20 MEQ: 1500 TABLET, EXTENDED RELEASE ORAL at 13:13

## 2023-10-21 RX ADMIN — ENOXAPARIN SODIUM 60 MG: 80 INJECTION SUBCUTANEOUS at 08:48

## 2023-10-21 RX ADMIN — METOPROLOL SUCCINATE 50 MG: 50 TABLET, EXTENDED RELEASE ORAL at 22:02

## 2023-10-21 RX ADMIN — MAGNESIUM OXIDE TAB 400 MG (241.3 MG ELEMENTAL MG) 400 MG: 400 (241.3 MG) TAB at 08:48

## 2023-10-21 RX ADMIN — SULFAMETHOXAZOLE AND TRIMETHOPRIM 1 TABLET: 800; 160 TABLET ORAL at 11:22

## 2023-10-21 RX ADMIN — MAGNESIUM SULFATE HEPTAHYDRATE 2 G: 40 INJECTION, SOLUTION INTRAVENOUS at 13:13

## 2023-10-21 RX ADMIN — MEROPENEM 1 G: 1 INJECTION, POWDER, FOR SOLUTION INTRAVENOUS at 08:50

## 2023-10-21 RX ADMIN — Medication 1 TABLET: at 08:48

## 2023-10-21 RX ADMIN — ACETAMINOPHEN 650 MG: 325 TABLET ORAL at 22:02

## 2023-10-21 RX ADMIN — ACETAMINOPHEN 650 MG: 325 TABLET ORAL at 04:00

## 2023-10-21 RX ADMIN — SULFAMETHOXAZOLE AND TRIMETHOPRIM 1 TABLET: 800; 160 TABLET ORAL at 22:02

## 2023-10-21 RX ADMIN — ACETAMINOPHEN 650 MG: 325 TABLET ORAL at 11:29

## 2023-10-21 RX ADMIN — FLUTICASONE FUROATE AND VILANTEROL TRIFENATATE 1 PUFF: 200; 25 POWDER RESPIRATORY (INHALATION) at 06:59

## 2023-10-21 RX ADMIN — ENOXAPARIN SODIUM 60 MG: 80 INJECTION SUBCUTANEOUS at 22:02

## 2023-10-21 RX ADMIN — MEROPENEM 1 G: 1 INJECTION, POWDER, FOR SOLUTION INTRAVENOUS at 15:36

## 2023-10-21 ASSESSMENT — PAIN SCALES - GENERAL
PAINLEVEL_OUTOF10: 0 - NO PAIN
PAINLEVEL_OUTOF10: 5 - MODERATE PAIN
PAINLEVEL_OUTOF10: 3
PAINLEVEL_OUTOF10: 5 - MODERATE PAIN

## 2023-10-21 ASSESSMENT — PAIN DESCRIPTION - DESCRIPTORS: DESCRIPTORS: ACHING;DISCOMFORT

## 2023-10-21 ASSESSMENT — PAIN - FUNCTIONAL ASSESSMENT
PAIN_FUNCTIONAL_ASSESSMENT: 0-10
PAIN_FUNCTIONAL_ASSESSMENT: 0-10

## 2023-10-21 NOTE — PROGRESS NOTES
Chris Chance is a 42 y.o. male on day 1 of admission presenting with Generalized weakness.      Subjective   No events overnight. Patient seen and examined at bedside. No complaints. States he took Tylenol for leg pain overnight an is avoiding narcotics.       Objective     Last Recorded Vitals  /58   Pulse 103   Temp 36.4 °C (97.5 °F)   Resp 17   Wt (!) 181 kg (399 lb 0.5 oz)   SpO2 93%   Intake/Output last 3 Shifts:    Intake/Output Summary (Last 24 hours) at 10/21/2023 0650  Last data filed at 10/20/2023 2301  Gross per 24 hour   Intake 100 ml   Output --   Net 100 ml         Admission Weight  Weight: (!) 181 kg (400 lb) (10/19/23 1120)    Daily Weight  10/19/23 : (!) 181 kg (399 lb 0.5 oz)    Image Results  ECG 12 lead  Sinus tachycardia  Abnormal R-wave progression, early transition  Abnormal T, consider ischemia, anterior leads    Confirmed by Ramicone, James ([id number]) on 10/7/2023 7:33:46 PM      Physical Exam  Constitutional:       General: He is not in acute distress.     Appearance: He is obese. He is not toxic-appearing.      Comments: Patient currently sitting up in a wheelchair.  Mother is at bedside.   HENT:      Head: Normocephalic and atraumatic.      Mouth/Throat:      Mouth: Mucous membranes are moist.      Pharynx: Oropharynx is clear.   Eyes:      Extraocular Movements: Extraocular movements intact.      Conjunctiva/sclera: Conjunctivae normal.      Pupils: Pupils are equal, round, and reactive to light.   Cardiovascular:      Rate and Rhythm: Normal rate and regular rhythm.   Pulmonary:      Comments: Diminished breath sounds due to body habitus.  Some slight wheezing noted.  Abdominal:      General: Bowel sounds are normal. There is distension.      Tenderness: There is abdominal tenderness (Generalized tenderness to abdominal area.). There is no guarding.   Musculoskeletal:         General: Swelling present.      Right lower leg: Edema present.      Left lower leg: Edema present.    Skin:     General: Skin is warm and dry.      Comments: Discoloration, edema, and swelling to bilateral lower extremities.  Wound on bottom left heel.  Dressing still on legs, going to wait for patient to be placed in a room and examined by podiatry to undress legs fully due to patient being in pain.   Neurological:      General: No focal deficit present.      Mental Status: He is alert and oriented to person, place, and time.   Psychiatric:         Mood and Affect: Mood normal.         Behavior: Behavior normal.     Relevant Results               Assessment/Plan                  Principal Problem:    Generalized weakness  Active Problems:    Multiple open wounds of lower extremity, complicated, sequela    Unable to ambulate    Chris Chance is a 42 y.o. male with past medical history significant for morbid obesity, hypertension, hyperlipidemia, gout, asthma, and MICHAEL who presents with bilateral lower extremity pain and wounds.  Presents today due to inability to care for himself at home, states that house was not prepared for him after discharge from nursing home and patient want to be placed somewhere after discharge from hospital.  Recent Tissue/Wound culture on 10/13/2023 grew multiple MDRO's including abundant Enterobacter clocae complex & Moderate Acinetobacter calcoaceticus-baumannii complex.  Was taken vancomycin and then switched to oral Bactrim which she is still taking.  Patient states he had a recent surgical debridement done on September 29. Has been in nursing home and working with PT/OT for the past couple weeks.  States he tested positive for COVID about a week ago as well.  Was released from the nursing home yesterday and when he got back home he had difficulty getting up the stairs into the house and also had difficulty getting around inside the house.  States there was no new bigger bed and also no commode, says the house is not prepared for him.  Patient was ambulating around with a walker  at home.       #History of Bilateral leg wounds-edematous  #Leukocytosis, WBC 14.1 today  Podiatry consult, no surgical indication  ID consulted, appreciate recs -- change antibiotics to Merrem and Bactrim  Previous cultures reviewed  Social work consult for placement   Admit for observation  Q8 vitals  Cardiac diet  CBC, BMP in a.m.  ESR, CRP added on  Urinalysis  Tylenol as needed for mild pain  Oxycodone as needed for moderate pain     #Hypomagnesemia, level 1.33 today  Magnesium replacement  Repeat magnesium in the a.m.     #Shortness of breath, history of asthma  Albuterol as needed for shortness of breath or wheezing  Breo Ellipta     #Constipation  Colace, MiraLAX     #weakness in lower extremities  PT/OT for evaluation and treatment      Continue patient's at home medications as appropriate     Chronic conditions:  #Obesity  #Hypertension  #Hyperlipidemia  #Gout  #MICHAEL  #Asthma     #DVT prophylaxis  Lovenox  Ambulation as tolerated with walker       Dispo: will need SNF       Erik Avila,

## 2023-10-21 NOTE — CARE PLAN
The patient's goals for the shift include      The clinical goals for the shift include Patient will report improved leg pain during shift    Over the shift, the patient did not make progress toward the following goals. Barriers to progression include reporting pain early. Recommendations to address these barriers include assess pain frequently.

## 2023-10-22 LAB
ANION GAP SERPL CALC-SCNC: 13 MMOL/L (ref 10–20)
BUN SERPL-MCNC: 12 MG/DL (ref 6–23)
CALCIUM SERPL-MCNC: 7.8 MG/DL (ref 8.6–10.3)
CHLORIDE SERPL-SCNC: 97 MMOL/L (ref 98–107)
CO2 SERPL-SCNC: 28 MMOL/L (ref 21–32)
CREAT SERPL-MCNC: 1 MG/DL (ref 0.5–1.3)
ERYTHROCYTE [DISTWIDTH] IN BLOOD BY AUTOMATED COUNT: 15.7 % (ref 11.5–14.5)
GFR SERPL CREATININE-BSD FRML MDRD: >90 ML/MIN/1.73M*2
GLUCOSE SERPL-MCNC: 86 MG/DL (ref 74–99)
HCT VFR BLD AUTO: 24.4 % (ref 41–52)
HGB BLD-MCNC: 7.2 G/DL (ref 13.5–17.5)
MAGNESIUM SERPL-MCNC: 1.79 MG/DL (ref 1.6–2.4)
MCH RBC QN AUTO: 30.5 PG (ref 26–34)
MCHC RBC AUTO-ENTMCNC: 29.5 G/DL (ref 32–36)
MCV RBC AUTO: 103 FL (ref 80–100)
NRBC BLD-RTO: 0 /100 WBCS (ref 0–0)
PLATELET # BLD AUTO: 174 X10*3/UL (ref 150–450)
PMV BLD AUTO: 8.8 FL (ref 7.5–11.5)
POTASSIUM SERPL-SCNC: 3.7 MMOL/L (ref 3.5–5.3)
RBC # BLD AUTO: 2.36 X10*6/UL (ref 4.5–5.9)
SODIUM SERPL-SCNC: 134 MMOL/L (ref 136–145)
WBC # BLD AUTO: 6.2 X10*3/UL (ref 4.4–11.3)

## 2023-10-22 PROCEDURE — 36415 COLL VENOUS BLD VENIPUNCTURE: CPT | Performed by: INTERNAL MEDICINE

## 2023-10-22 PROCEDURE — 1100000001 HC PRIVATE ROOM DAILY

## 2023-10-22 PROCEDURE — 96372 THER/PROPH/DIAG INJ SC/IM: CPT | Performed by: PHYSICIAN ASSISTANT

## 2023-10-22 PROCEDURE — 83735 ASSAY OF MAGNESIUM: CPT

## 2023-10-22 PROCEDURE — 2500000004 HC RX 250 GENERAL PHARMACY W/ HCPCS (ALT 636 FOR OP/ED): Performed by: INTERNAL MEDICINE

## 2023-10-22 PROCEDURE — 85027 COMPLETE CBC AUTOMATED: CPT | Performed by: INTERNAL MEDICINE

## 2023-10-22 PROCEDURE — 2500000001 HC RX 250 WO HCPCS SELF ADMINISTERED DRUGS (ALT 637 FOR MEDICARE OP): Performed by: PHYSICIAN ASSISTANT

## 2023-10-22 PROCEDURE — 2500000004 HC RX 250 GENERAL PHARMACY W/ HCPCS (ALT 636 FOR OP/ED): Performed by: PHYSICIAN ASSISTANT

## 2023-10-22 PROCEDURE — 94640 AIRWAY INHALATION TREATMENT: CPT

## 2023-10-22 PROCEDURE — 2500000002 HC RX 250 W HCPCS SELF ADMINISTERED DRUGS (ALT 637 FOR MEDICARE OP, ALT 636 FOR OP/ED): Performed by: PHYSICIAN ASSISTANT

## 2023-10-22 PROCEDURE — 99232 SBSQ HOSP IP/OBS MODERATE 35: CPT | Performed by: INTERNAL MEDICINE

## 2023-10-22 PROCEDURE — 82374 ASSAY BLOOD CARBON DIOXIDE: CPT | Performed by: INTERNAL MEDICINE

## 2023-10-22 PROCEDURE — 2500000004 HC RX 250 GENERAL PHARMACY W/ HCPCS (ALT 636 FOR OP/ED): Performed by: NURSE PRACTITIONER

## 2023-10-22 RX ORDER — MAGNESIUM SULFATE HEPTAHYDRATE 40 MG/ML
4 INJECTION, SOLUTION INTRAVENOUS ONCE
Status: COMPLETED | OUTPATIENT
Start: 2023-10-22 | End: 2023-10-22

## 2023-10-22 RX ORDER — FUROSEMIDE 10 MG/ML
20 INJECTION INTRAMUSCULAR; INTRAVENOUS DAILY
Status: DISCONTINUED | OUTPATIENT
Start: 2023-10-22 | End: 2023-10-29 | Stop reason: HOSPADM

## 2023-10-22 RX ORDER — ALBUTEROL SULFATE 0.83 MG/ML
2.5 SOLUTION RESPIRATORY (INHALATION) EVERY 2 HOUR PRN
Status: DISCONTINUED | OUTPATIENT
Start: 2023-10-22 | End: 2023-10-29 | Stop reason: HOSPADM

## 2023-10-22 RX ADMIN — MEROPENEM 1 G: 1 INJECTION, POWDER, FOR SOLUTION INTRAVENOUS at 10:41

## 2023-10-22 RX ADMIN — FLUTICASONE FUROATE AND VILANTEROL TRIFENATATE 1 PUFF: 200; 25 POWDER RESPIRATORY (INHALATION) at 07:42

## 2023-10-22 RX ADMIN — MEROPENEM 1 G: 1 INJECTION, POWDER, FOR SOLUTION INTRAVENOUS at 23:33

## 2023-10-22 RX ADMIN — MEROPENEM 1 G: 1 INJECTION, POWDER, FOR SOLUTION INTRAVENOUS at 16:14

## 2023-10-22 RX ADMIN — FUROSEMIDE 20 MG: 10 INJECTION, SOLUTION INTRAMUSCULAR; INTRAVENOUS at 16:14

## 2023-10-22 RX ADMIN — Medication 1 TABLET: at 10:40

## 2023-10-22 RX ADMIN — MEROPENEM 1 G: 1 INJECTION, POWDER, FOR SOLUTION INTRAVENOUS at 00:20

## 2023-10-22 RX ADMIN — MAGNESIUM SULFATE HEPTAHYDRATE 4 G: 40 INJECTION, SOLUTION INTRAVENOUS at 10:43

## 2023-10-22 RX ADMIN — ENOXAPARIN SODIUM 60 MG: 80 INJECTION SUBCUTANEOUS at 20:44

## 2023-10-22 RX ADMIN — MAGNESIUM OXIDE TAB 400 MG (241.3 MG ELEMENTAL MG) 400 MG: 400 (241.3 MG) TAB at 10:40

## 2023-10-22 RX ADMIN — SULFAMETHOXAZOLE AND TRIMETHOPRIM 1 TABLET: 800; 160 TABLET ORAL at 10:40

## 2023-10-22 RX ADMIN — METOPROLOL SUCCINATE 50 MG: 50 TABLET, EXTENDED RELEASE ORAL at 20:44

## 2023-10-22 RX ADMIN — POLYETHYLENE GLYCOL 3350 17 G: 17 POWDER, FOR SOLUTION ORAL at 10:40

## 2023-10-22 RX ADMIN — ACETAMINOPHEN 650 MG: 325 TABLET ORAL at 16:14

## 2023-10-22 RX ADMIN — ACETAMINOPHEN 650 MG: 325 TABLET ORAL at 04:31

## 2023-10-22 RX ADMIN — SULFAMETHOXAZOLE AND TRIMETHOPRIM 1 TABLET: 800; 160 TABLET ORAL at 20:44

## 2023-10-22 RX ADMIN — ACETAMINOPHEN 650 MG: 325 TABLET ORAL at 20:10

## 2023-10-22 RX ADMIN — ENOXAPARIN SODIUM 60 MG: 80 INJECTION SUBCUTANEOUS at 10:40

## 2023-10-22 ASSESSMENT — COGNITIVE AND FUNCTIONAL STATUS - GENERAL
HELP NEEDED FOR BATHING: A LOT
PERSONAL GROOMING: A LITTLE
MOVING FROM LYING ON BACK TO SITTING ON SIDE OF FLAT BED WITH BEDRAILS: A LITTLE
MOVING FROM LYING ON BACK TO SITTING ON SIDE OF FLAT BED WITH BEDRAILS: A LITTLE
TURNING FROM BACK TO SIDE WHILE IN FLAT BAD: A LITTLE
STANDING UP FROM CHAIR USING ARMS: A LITTLE
HELP NEEDED FOR BATHING: A LOT
STANDING UP FROM CHAIR USING ARMS: A LITTLE
DRESSING REGULAR LOWER BODY CLOTHING: TOTAL
DAILY ACTIVITIY SCORE: 15
TOILETING: A LOT
PERSONAL GROOMING: A LOT
DRESSING REGULAR UPPER BODY CLOTHING: A LITTLE
PERSONAL GROOMING: A LITTLE
TOILETING: A LITTLE
DAILY ACTIVITIY SCORE: 13
MOBILITY SCORE: 16
DRESSING REGULAR LOWER BODY CLOTHING: TOTAL
HELP NEEDED FOR BATHING: A LOT
CLIMB 3 TO 5 STEPS WITH RAILING: A LOT
DRESSING REGULAR LOWER BODY CLOTHING: TOTAL
WALKING IN HOSPITAL ROOM: A LITTLE
MOBILITY SCORE: 17
MOVING TO AND FROM BED TO CHAIR: A LITTLE
MOBILITY SCORE: 17
DRESSING REGULAR UPPER BODY CLOTHING: A LOT
TURNING FROM BACK TO SIDE WHILE IN FLAT BAD: A LITTLE
WALKING IN HOSPITAL ROOM: A LITTLE
MOVING TO AND FROM BED TO CHAIR: A LITTLE
DRESSING REGULAR UPPER BODY CLOTHING: A LITTLE
MOVING FROM LYING ON BACK TO SITTING ON SIDE OF FLAT BED WITH BEDRAILS: A LITTLE
TOILETING: A LOT
WALKING IN HOSPITAL ROOM: A LITTLE
TURNING FROM BACK TO SIDE WHILE IN FLAT BAD: A LITTLE
MOVING TO AND FROM BED TO CHAIR: A LITTLE
CLIMB 3 TO 5 STEPS WITH RAILING: TOTAL
STANDING UP FROM CHAIR USING ARMS: A LITTLE
DAILY ACTIVITIY SCORE: 16
CLIMB 3 TO 5 STEPS WITH RAILING: A LOT

## 2023-10-22 ASSESSMENT — PAIN SCALES - GENERAL
PAINLEVEL_OUTOF10: 3
PAINLEVEL_OUTOF10: 0 - NO PAIN
PAINLEVEL_OUTOF10: 4
PAINLEVEL_OUTOF10: 1
PAINLEVEL_OUTOF10: 4
PAINLEVEL_OUTOF10: 5 - MODERATE PAIN

## 2023-10-22 ASSESSMENT — PAIN DESCRIPTION - DESCRIPTORS
DESCRIPTORS: ACHING
DESCRIPTORS: ACHING;DISCOMFORT

## 2023-10-22 ASSESSMENT — PAIN - FUNCTIONAL ASSESSMENT
PAIN_FUNCTIONAL_ASSESSMENT: 0-10

## 2023-10-22 NOTE — CARE PLAN
The patient's goals for the shift include  no pain.    The clinical goals for the shift include decreased leg pain.    Over the shift, the patient met all goals.

## 2023-10-22 NOTE — NURSING NOTE
Patient up in chair on assessment.  Moshe bed at bedside. When asked if would like to get into bed patient stated I can't get into it. RN offered step stool to assist. Patient refused said I rather stay in chair. Patient requested a larger chair. Will try to find [facility] chair for patient. Patient said he is fine if larger chair unavailable at this time.  Patient has no complaints of pain. Stated he does not want any Oxycodone for pain just Tylenol but not needing any at this time. Rn offered to replace [facility] bed with regular bed and patient refused at this time.

## 2023-10-23 LAB
ANION GAP SERPL CALC-SCNC: 12 MMOL/L (ref 10–20)
BACTERIA BLD CULT: NORMAL
BACTERIA BLD CULT: NORMAL
BUN SERPL-MCNC: 10 MG/DL (ref 6–23)
CALCIUM SERPL-MCNC: 7.4 MG/DL (ref 8.6–10.3)
CHLORIDE SERPL-SCNC: 98 MMOL/L (ref 98–107)
CO2 SERPL-SCNC: 28 MMOL/L (ref 21–32)
CREAT SERPL-MCNC: 0.97 MG/DL (ref 0.5–1.3)
ERYTHROCYTE [DISTWIDTH] IN BLOOD BY AUTOMATED COUNT: 15.5 % (ref 11.5–14.5)
ESTIMATED AVERAGE GLUCOSE FOR HBA1C: NORMAL
GFR SERPL CREATININE-BSD FRML MDRD: >90 ML/MIN/1.73M*2
GLUCOSE SERPL-MCNC: 90 MG/DL (ref 74–99)
HCT VFR BLD AUTO: 24.5 % (ref 41–52)
HEMOGLOBIN A1C/HEMOGLOBIN TOTAL IN BLOOD: NORMAL
HGB A1C-DATA CONVERSION: NORMAL %
HGB BLD-MCNC: 7.5 G/DL (ref 13.5–17.5)
MCH RBC QN AUTO: 31.6 PG (ref 26–34)
MCHC RBC AUTO-ENTMCNC: 30.6 G/DL (ref 32–36)
MCV RBC AUTO: 103 FL (ref 80–100)
NRBC BLD-RTO: 0 /100 WBCS (ref 0–0)
PLATELET # BLD AUTO: 173 X10*3/UL (ref 150–450)
PMV BLD AUTO: 8.9 FL (ref 7.5–11.5)
POTASSIUM SERPL-SCNC: 3.7 MMOL/L (ref 3.5–5.3)
RBC # BLD AUTO: 2.37 X10*6/UL (ref 4.5–5.9)
SODIUM SERPL-SCNC: 134 MMOL/L (ref 136–145)
WBC # BLD AUTO: 4.6 X10*3/UL (ref 4.4–11.3)

## 2023-10-23 PROCEDURE — 2500000004 HC RX 250 GENERAL PHARMACY W/ HCPCS (ALT 636 FOR OP/ED): Performed by: PHYSICIAN ASSISTANT

## 2023-10-23 PROCEDURE — 99232 SBSQ HOSP IP/OBS MODERATE 35: CPT | Performed by: INTERNAL MEDICINE

## 2023-10-23 PROCEDURE — 96372 THER/PROPH/DIAG INJ SC/IM: CPT | Performed by: PHYSICIAN ASSISTANT

## 2023-10-23 PROCEDURE — 2500000004 HC RX 250 GENERAL PHARMACY W/ HCPCS (ALT 636 FOR OP/ED): Performed by: INTERNAL MEDICINE

## 2023-10-23 PROCEDURE — 1100000001 HC PRIVATE ROOM DAILY

## 2023-10-23 PROCEDURE — 2500000001 HC RX 250 WO HCPCS SELF ADMINISTERED DRUGS (ALT 637 FOR MEDICARE OP): Performed by: NURSE PRACTITIONER

## 2023-10-23 PROCEDURE — 94640 AIRWAY INHALATION TREATMENT: CPT

## 2023-10-23 PROCEDURE — 85027 COMPLETE CBC AUTOMATED: CPT | Performed by: INTERNAL MEDICINE

## 2023-10-23 PROCEDURE — 36415 COLL VENOUS BLD VENIPUNCTURE: CPT | Performed by: INTERNAL MEDICINE

## 2023-10-23 PROCEDURE — 97116 GAIT TRAINING THERAPY: CPT | Mod: GP,CQ

## 2023-10-23 PROCEDURE — 80048 BASIC METABOLIC PNL TOTAL CA: CPT | Performed by: INTERNAL MEDICINE

## 2023-10-23 PROCEDURE — 2500000004 HC RX 250 GENERAL PHARMACY W/ HCPCS (ALT 636 FOR OP/ED): Performed by: NURSE PRACTITIONER

## 2023-10-23 PROCEDURE — 2500000001 HC RX 250 WO HCPCS SELF ADMINISTERED DRUGS (ALT 637 FOR MEDICARE OP): Performed by: PHYSICIAN ASSISTANT

## 2023-10-23 RX ORDER — L. ACIDOPHILUS/L.BULGARICUS 1MM CELL
1 TABLET ORAL DAILY
Status: DISCONTINUED | OUTPATIENT
Start: 2023-10-23 | End: 2023-10-23

## 2023-10-23 RX ORDER — METOPROLOL SUCCINATE 25 MG/1
25 TABLET, EXTENDED RELEASE ORAL DAILY
Status: DISCONTINUED | OUTPATIENT
Start: 2023-10-23 | End: 2023-10-23

## 2023-10-23 RX ORDER — VANCOMYCIN HYDROCHLORIDE 125 MG/1
125 CAPSULE ORAL 4 TIMES DAILY
Status: DISCONTINUED | OUTPATIENT
Start: 2023-10-23 | End: 2023-10-25

## 2023-10-23 RX ORDER — DOCUSATE SODIUM 100 MG/1
100 CAPSULE, LIQUID FILLED ORAL 2 TIMES DAILY
Status: DISCONTINUED | OUTPATIENT
Start: 2023-10-23 | End: 2023-10-29 | Stop reason: HOSPADM

## 2023-10-23 RX ADMIN — ENOXAPARIN SODIUM 60 MG: 80 INJECTION SUBCUTANEOUS at 20:04

## 2023-10-23 RX ADMIN — METOPROLOL SUCCINATE 50 MG: 50 TABLET, EXTENDED RELEASE ORAL at 20:04

## 2023-10-23 RX ADMIN — MEROPENEM 1 G: 1 INJECTION, POWDER, FOR SOLUTION INTRAVENOUS at 23:48

## 2023-10-23 RX ADMIN — POLYETHYLENE GLYCOL 3350 17 G: 17 POWDER, FOR SOLUTION ORAL at 08:23

## 2023-10-23 RX ADMIN — MAGNESIUM OXIDE TAB 400 MG (241.3 MG ELEMENTAL MG) 400 MG: 400 (241.3 MG) TAB at 08:23

## 2023-10-23 RX ADMIN — MEROPENEM 1 G: 1 INJECTION, POWDER, FOR SOLUTION INTRAVENOUS at 08:22

## 2023-10-23 RX ADMIN — ACETAMINOPHEN 650 MG: 325 TABLET ORAL at 18:25

## 2023-10-23 RX ADMIN — Medication 1 TABLET: at 08:23

## 2023-10-23 RX ADMIN — VANCOMYCIN HYDROCHLORIDE 125 MG: 125 CAPSULE ORAL at 20:04

## 2023-10-23 RX ADMIN — ACETAMINOPHEN 650 MG: 325 TABLET ORAL at 02:37

## 2023-10-23 RX ADMIN — SULFAMETHOXAZOLE AND TRIMETHOPRIM 1 TABLET: 800; 160 TABLET ORAL at 20:04

## 2023-10-23 RX ADMIN — ACETAMINOPHEN 650 MG: 325 TABLET ORAL at 12:44

## 2023-10-23 RX ADMIN — FUROSEMIDE 20 MG: 10 INJECTION, SOLUTION INTRAMUSCULAR; INTRAVENOUS at 08:23

## 2023-10-23 RX ADMIN — DOCUSATE SODIUM 100 MG: 100 CAPSULE, LIQUID FILLED ORAL at 20:05

## 2023-10-23 RX ADMIN — FLUTICASONE FUROATE AND VILANTEROL TRIFENATATE 1 PUFF: 200; 25 POWDER RESPIRATORY (INHALATION) at 06:59

## 2023-10-23 RX ADMIN — SULFAMETHOXAZOLE AND TRIMETHOPRIM 1 TABLET: 800; 160 TABLET ORAL at 08:23

## 2023-10-23 RX ADMIN — MEROPENEM 1 G: 1 INJECTION, POWDER, FOR SOLUTION INTRAVENOUS at 16:00

## 2023-10-23 RX ADMIN — ENOXAPARIN SODIUM 60 MG: 80 INJECTION SUBCUTANEOUS at 08:23

## 2023-10-23 RX ADMIN — VANCOMYCIN HYDROCHLORIDE 125 MG: 125 CAPSULE ORAL at 17:40

## 2023-10-23 ASSESSMENT — COGNITIVE AND FUNCTIONAL STATUS - GENERAL
MOBILITY SCORE: 17
TURNING FROM BACK TO SIDE WHILE IN FLAT BAD: A LITTLE
WALKING IN HOSPITAL ROOM: A LITTLE
MOVING TO AND FROM BED TO CHAIR: A LITTLE
MOVING FROM LYING ON BACK TO SITTING ON SIDE OF FLAT BED WITH BEDRAILS: A LITTLE
WALKING IN HOSPITAL ROOM: A LITTLE
TOILETING: A LOT
MOBILITY SCORE: 17
TURNING FROM BACK TO SIDE WHILE IN FLAT BAD: A LITTLE
MOVING TO AND FROM BED TO CHAIR: A LITTLE
PERSONAL GROOMING: A LITTLE
CLIMB 3 TO 5 STEPS WITH RAILING: A LOT
STANDING UP FROM CHAIR USING ARMS: A LITTLE
STANDING UP FROM CHAIR USING ARMS: A LITTLE
DRESSING REGULAR UPPER BODY CLOTHING: A LITTLE
CLIMB 3 TO 5 STEPS WITH RAILING: A LOT
DAILY ACTIVITIY SCORE: 18
DRESSING REGULAR LOWER BODY CLOTHING: A LITTLE
HELP NEEDED FOR BATHING: A LITTLE
MOVING FROM LYING ON BACK TO SITTING ON SIDE OF FLAT BED WITH BEDRAILS: A LITTLE

## 2023-10-23 ASSESSMENT — PAIN - FUNCTIONAL ASSESSMENT
PAIN_FUNCTIONAL_ASSESSMENT: 0-10

## 2023-10-23 ASSESSMENT — PAIN SCALES - GENERAL
PAINLEVEL_OUTOF10: 5 - MODERATE PAIN
PAINLEVEL_OUTOF10: 3
PAINLEVEL_OUTOF10: 4
PAINLEVEL_OUTOF10: 4
PAINLEVEL_OUTOF10: 0 - NO PAIN
PAINLEVEL_OUTOF10: 5 - MODERATE PAIN
PAINLEVEL_OUTOF10: 4
PAINLEVEL_OUTOF10: 4

## 2023-10-23 ASSESSMENT — PAIN DESCRIPTION - DESCRIPTORS
DESCRIPTORS: ACHING

## 2023-10-23 NOTE — PROGRESS NOTES
Chris Chance is a 42 y.o. male on day 2 of admission presenting with Generalized weakness.      Subjective   No events overnight.      Objective     Last Recorded Vitals  /67 (BP Location: Right arm, Patient Position: Sitting)   Pulse 98   Temp 36 °C (96.8 °F) (Temporal)   Resp 18   Wt (!) 185 kg (406 lb 12 oz)   SpO2 98%   Intake/Output last 3 Shifts:    Intake/Output Summary (Last 24 hours) at 10/22/2023 2231  Last data filed at 10/22/2023 2100  Gross per 24 hour   Intake 150 ml   Output --   Net 150 ml         Admission Weight  Weight: (!) 181 kg (400 lb) (10/19/23 1120)    Daily Weight  10/22/23 : (!) 185 kg (406 lb 12 oz)    Image Results  ECG 12 lead  Sinus tachycardia  Abnormal R-wave progression, early transition  Abnormal T, consider ischemia, anterior leads    Confirmed by Ramicone, James ([id number]) on 10/7/2023 7:33:46 PM      Physical Exam  Constitutional:       General: He is not in acute distress.     Appearance: He is obese. He is not toxic-appearing.      Comments: Patient currently sitting up in a wheelchair.  Mother is at bedside.   HENT:      Head: Normocephalic and atraumatic.      Mouth/Throat:      Mouth: Mucous membranes are moist.      Pharynx: Oropharynx is clear.   Eyes:      Extraocular Movements: Extraocular movements intact.      Conjunctiva/sclera: Conjunctivae normal.      Pupils: Pupils are equal, round, and reactive to light.   Cardiovascular:      Rate and Rhythm: Normal rate and regular rhythm.   Pulmonary:      Comments: Diminished breath sounds due to body habitus.  Some slight wheezing noted.  Abdominal:      General: Bowel sounds are normal. There is distension.      Tenderness: There is abdominal tenderness (Generalized tenderness to abdominal area.). There is no guarding.   Musculoskeletal:         General: Swelling present.      Right lower leg: Edema present.      Left lower leg: Edema present.   Skin:     General: Skin is warm and dry.      Comments:  Discoloration, edema, and swelling to bilateral lower extremities.  Wound on bottom left heel.  Dressing still on legs, going to wait for patient to be placed in a room and examined by podiatry to undress legs fully due to patient being in pain.   Neurological:      General: No focal deficit present.      Mental Status: He is alert and oriented to person, place, and time.   Psychiatric:         Mood and Affect: Mood normal.         Behavior: Behavior normal.     Relevant Results               Assessment/Plan                  Principal Problem:    Generalized weakness  Active Problems:    Multiple open wounds of lower extremity, complicated, sequela    Unable to ambulate    Chris Chance is a 42 y.o. male with past medical history significant for morbid obesity, hypertension, hyperlipidemia, gout, asthma, and MICHAEL who presents with bilateral lower extremity pain and wounds.  Presents today due to inability to care for himself at home, states that house was not prepared for him after discharge from nursing home and patient want to be placed somewhere after discharge from hospital.  Recent Tissue/Wound culture on 10/13/2023 grew multiple MDRO's including abundant Enterobacter clocae complex & Moderate Acinetobacter calcoaceticus-baumannii complex.  Was taken vancomycin and then switched to oral Bactrim which she is still taking.  Patient states he had a recent surgical debridement done on September 29. Has been in nursing home and working with PT/OT for the past couple weeks.  States he tested positive for COVID about a week ago as well.  Was released from the nursing home yesterday and when he got back home he had difficulty getting up the stairs into the house and also had difficulty getting around inside the house.  States there was no new bigger bed and also no commode, says the house is not prepared for him.  Patient was ambulating around with a walker at home.       #History of Bilateral leg  wounds-edematous  #Leukocytosis, WBC 14.1 today  Podiatry consult, no surgical indication  ID consulted, appreciate recs -- change antibiotics to Merrem and Bactrim  Previous cultures reviewed  Social work consult for placement   Admit for observation  Q8 vitals  Cardiac diet  CBC, BMP in a.m.  ESR, CRP added on  Urinalysis  Tylenol as needed for mild pain  Oxycodone as needed for moderate pain     #Hypomagnesemia, level 1.33 today  Magnesium replacement  Repeat magnesium in the a.m.     #Shortness of breath, history of asthma  Albuterol as needed for shortness of breath or wheezing  Breo Ellipta     #Constipation  Colace, MiraLAX     #weakness in lower extremities  PT/OT for evaluation and treatment      Continue patient's at home medications as appropriate     Chronic conditions:  #Obesity  #Hypertension  #Hyperlipidemia  #Gout  #MICHAEL  #Asthma     #DVT prophylaxis  Lovenox  Ambulation as tolerated with walker       Dispo: will need SNF       Erik Avila, DO

## 2023-10-23 NOTE — CARE PLAN
The patient's goals for the shift include  Pain control, comfort    The clinical goals for the shift include Tolerate dressing changes

## 2023-10-23 NOTE — PROGRESS NOTES
For follow up of infected bilateral lower extremity ulcers    Subjective   He has no new complaints right now       Current Facility-Administered Medications   Medication Dose Route Frequency Provider Last Rate Last Admin    acetaminophen (Tylenol) tablet 650 mg  650 mg oral q4h PRN Anibal Frazier PA-C   650 mg at 10/23/23 0237    albuterol 2.5 mg /3 mL (0.083 %) nebulizer solution 2.5 mg  2.5 mg nebulization q2h PRN Erik Avila DO        docusate sodium (Colace) capsule 100 mg  100 mg oral BID PRN Anibal Frazier PA-C        docusate sodium (Colace) capsule 100 mg  100 mg oral BID DODIE Herring        enoxaparin (Lovenox) syringe 60 mg  60 mg subcutaneous q12h BILLY Anibal Frazier PA-C   60 mg at 10/23/23 0823    fluticasone furoate-vilanteroL (Breo Ellipta) 200-25 mcg/dose inhaler 1 puff  1 puff inhalation Daily Anibal Frazier PA-C   1 puff at 10/23/23 0659    furosemide (Lasix) injection 20 mg  20 mg intravenous Daily Erik Avila DO   20 mg at 10/23/23 0823    lactobacillus acidophilus tablet 1 tablet  1 tablet oral Daily Anibal Frazier PA-C   1 tablet at 10/23/23 0823    magnesium oxide (Mag-Ox) tablet 400 mg  400 mg oral Daily DODIE Herring   400 mg at 10/23/23 0823    meropenem (Merrem) in sodium chloride 0.9 % 100 mL IV 1 g  1 g intravenous q8h Jonah Hendrickson MD   Stopped at 10/23/23 0852    metoprolol succinate XL (Toprol-XL) 24 hr tablet 50 mg  50 mg oral q PM Anibal Farzier PA-C   50 mg at 10/22/23 2044    nystatin (Mycostatin) 100,000 unit/gram powder   Topical BID Anibal Frazier PA-C        oxyCODONE (Roxicodone) immediate release tablet 5 mg  5 mg oral q4h PRN Anibal Frazier PA-C   5 mg at 10/20/23 0330    polyethylene glycol (Glycolax, Miralax) packet 17 g  17 g oral Daily Anibal Frazier PA-C   17 g at 10/23/23 0823    simethicone (Mylicon) chewable tablet 80 mg  80 mg oral TID PRN Anibal Frazier PA-C         sulfamethoxazole-trimethoprim (Bactrim DS) 800-160 mg per tablet 1 tablet  1 tablet oral BID Anibal Frazier PA-C   1 tablet at 10/23/23 0823        Objective     Last Recorded Vitals  /71 (BP Location: Left arm)   Pulse 91   Temp 36.4 °C (97.5 °F) (Temporal)   Resp 20   Wt (!) 184 kg (406 lb 4.9 oz)   SpO2 96%     General: no acute distress, sitting in recliner with feet on the ground  HEENT: pink pharynx  CVS: RRR  Resp: decreased breath sounds in bases  ABD: soft, NT, ND  EXT: Both legs wrapped  Skin: no rash     Relevant Results    Assessment/Plan   Bilateral lower extremity venous stasis with bilateral ulcers that are polymicrobially-infected.  I have personally and independently reviewed and interpreted his laboratory test, imaging studies, and the documentation from other healthcare providers.  He is being treated with broad antibiotics for a left lower extremity cellulitis.   His cultures have shown a multidrug-resistant Acinetobacter and and Enterobacter.  His leukocytosis has resolved.  I am monitoring for side effects from meropenem and Bactrim as outlined in the previous note.      -Continue meropenem and Bactrim for now  -Encourage lower extremity elevation  -Continue local wound care and compression     Other issues:  #Hypertension  #Hyperlipidemia  #Asthma  #Obstructive sleep apnea              Jonah Hendrickson MD

## 2023-10-23 NOTE — PROGRESS NOTES
Physical Therapy    Physical Therapy Treatment    Patient Name: Chris Chance  MRN: [id number]  Today's Date: 10/23/2023  Time Calculation  Start Time: 1038  Stop Time: 1051  Time Calculation (min): 13 min       Assessment/Plan   PT Assessment  PT Assessment Results: Decreased strength, Decreased range of motion, Decreased endurance, Impaired balance, Decreased mobility  Rehab Prognosis: Good  Evaluation/Treatment Tolerance: Patient limited by pain  Medical Staff Made Aware: Yes (notified Zay)  End of Session Communication: Bedside nurse  Assessment Comment:  (42 year old male admitted with LE wounds, LE pain and fall from couch, who presents to PT with the above mentioned impairments, impaired transfers, gait and stair negotition who would benefit from contined in house PT.)  End of Session Patient Position: Up in chair, Alarm off, not on at start of session     PT Plan  Treatment/Interventions: Bed mobility, Transfer training, Gait training, Stair training, Balance training, Strengthening, Therapeutic exercise, Therapeutic activity, Home exercise program  PT Plan: Skilled PT  PT Frequency: 3 times per week  PT Discharge Recommendations: Moderate intensity level of continued care  PT Recommended Transfer Status: Assist x1 (and bariatic walker, short in room distances)  PT - OK to Discharge: Yes      General Visit Information:   PT  Visit  PT Received On: 10/23/23  General  Prior to Session Communication: Bedside nurse  Patient Position Received: Up in chair, Alarm off, not on at start of session  General Comment:  (pt agreeable to participate in therapy session)    Subjective   Precautions:  Precautions  Medical Precautions: Fall precautions, Cardiac precautions, Infection precautions  Precautions Comment:  (B LE bandaged)       Objective   Pain:  Pain Assessment  Pain Assessment:  (pt c/o bilat heel pain only when weight bearing, up to 4/10)     Treatments:  Therapeutic Exercise  Therapeutic Exercise  Performed:  (reviewed seated ther ex)    Bed Mobility  Bed Mobility:  (NT - pt seated in chair upon therapy arrival and departure)    Ambulation/Gait Training  Ambulation/Gait Training Performed:  (pt ambulates ~10 ft x 2 with FWW and SBA.  limited d/t pain.)    Transfers  Transfer:  (sit <-> stand with FWW and SBA to supervision)    Outcome Measures:  [facility] Basic Mobility  Turning from your back to your side while in a flat bed without using bedrails: A little  Moving from lying on your back to sitting on the side of a flat bed without using bedrails: A little  Moving to and from bed to chair (including a wheelchair): A little  Standing up from a chair using your arms (e.g. wheelchair or bedside chair): A little  To walk in hospital room: A little  Climbing 3-5 steps with railing: A lot  Basic Mobility - Total Score: 17    Education Documentation  Precautions, taught by Anai Jiménez PTA at 10/23/2023  1:00 PM.  Learner: Patient  Readiness: Acceptance  Method: Explanation  Response: Verbalizes Understanding, Demonstrated Understanding    Home Exercise Program, taught by Anai Jiménez PTA at 10/23/2023  1:00 PM.  Learner: Patient  Readiness: Acceptance  Method: Explanation  Response: Verbalizes Understanding, Demonstrated Understanding    Mobility Training, taught by Anai Jiménez PTA at 10/23/2023  1:00 PM.  Learner: Patient  Readiness: Acceptance  Method: Explanation  Response: Verbalizes Understanding, Demonstrated Understanding    Education Comments  No comments found.        EDUCATION:       Encounter Problems       Encounter Problems (Active)       Mobility       STG - Patient will ambulate (Progressing)       Start:  10/20/23    Expected End:  11/10/23       >/= 100 feet with supervision, adaptive vital sign response and RPE </= 13/20         Goal 1 (Progressing)       Start:  10/20/23    Expected End:  11/10/23       Pt will perform >/= 3 steps with 1 HR and CGA              Transfers       STG -  Transfer from bed to chair (Progressing)       Start:  10/20/23    Expected End:  11/10/23       With modified independence and LRAD         STG - Patient will perform bed mobility (Progressing)       Start:  10/20/23    Expected End:  11/10/23       With modified independence         STG - Patient will transfer sit to and from stand (Progressing)       Start:  10/20/23    Expected End:  11/10/23

## 2023-10-23 NOTE — CARE PLAN
To remain sfe during the shiftThe patient's goals for the shift include      The clinical goals for the shift include Pt. will have no complaints of pain.    Over the shift, the patient did not make progress toward the following goals. Barriers to progression include patient still with c/o pain. Recommendations to address these barriers include continue to monitor pain levels.

## 2023-10-23 NOTE — PROGRESS NOTES
Podiatry Progress Note  SERVICE DATE: 10/23/2023     SERVICE TIME:  1:52 PM      Chris Chance is a 42 y.o. male on day 3 of admission presenting with Generalized weakness.    Subjective   Pt laying comfortably in recliner chair. States his pain has gotten better. Continue to experience weeping and denies constitution.        Physical Exam    Objective     Vascular: Faintly palpable DP/PT pulses B/L. Moderate pitting edema noted B/L. Hair growth absent B/L. CFT<5 to B/L hallux. Temperature is warm to cool from tibial tuberosity to distal digits B/L. No lymphatic streaking noted B/L.     Musculoskeletal: Gross active and passive ROM intact to age and activity level. Moves all extremities spontaneously. No pain to palpation at feet B/L.      Neurological: unchanged, BLE     Dermatologic:   Wound:   Measurements: Right Lower Extremity: 7.5 cm x 9.2cm x 0.1cm  Mixed wound base of Mixed fibro granular tissue.   Mild serosanguinous drainage with fibrotic slough.   None mellisa-wound maceration.   Moderate mellisa-wound erythema.   Minimal evidence of ascending cellulitis or lymphangitis.  None palpable fluctuance. Minimal malodor. Mild increased warmth.   None probe to bone or deep structures within the wound base.   None tunneling or tracking.   None undermining. Skin edges irregular but intact.     Measurements: Left Lower Extremity  23 cm x 14.2 cm x 0.1 cm  Mixed wound base of Fibro granular tissue.   Significant serosanguinous drainage when compared to the right leg  with fibrotic slough.   Mild mellisa-wound maceration.   Moderate mellisa-wound erythema.   Minimal evidence of ascending cellulitis or lymphangitis.  None palpable fluctuance. Mild malodor. None increased warmth.   None probe to bone or deep structures within the wound base.   None tunneling or tracking.   None undermining. Skin edges irregular but intact.           Last Recorded Vitals  Blood pressure 133/71, pulse 91, temperature 36.4 °C (97.5 °F),  "temperature source Temporal, resp. rate 20, height 1.702 m (5' 7\"), weight (!) 184 kg (406 lb 4.9 oz), SpO2 96 %.    Intake/Output last 3 Shifts:  I/O last 3 completed shifts:  In: 450 (2.4 mL/kg) [P.O.:350; IV Piggyback:100]  Out: 1 (0 mL/kg) [Stool:1]  Weight: 184.3 kg     Relevant Results      Lab Results   Component Value Date    SEDRATE 44 (H) 10/19/2023    CRP 1.66 (H) 10/19/2023    BLOODCULT No growth at 3 days 10/19/2023    BLOODCULT No growth at 3 days 10/19/2023    TISWDCULTSME (4+) Abundant Enterobacter cloacae complex (A) 10/13/2023    TISWDCULTSME (A) 10/13/2023     (3+) Moderate Acinetobacter calcoaceticus-baumannii complex       === 09/28/23 ===    XR CHEST 1 VIEW    - Impression -  No acute cardiopulmonary process. The patient's known bilateral  patchy ground-glass opacity seen on CT angiogram of the chest dated  10/02/2023 are not appreciated radiographically.          Assessment/Plan     Assessment:  #Circumferential B/L leg wounds   #HTN  #Asthma  #HLD  #MICHAEL    Plan:  Plan:   - Patient was seen and evaluated; all findings were discussed and all questions were answered to patient's satisfaction.  - Patient wounds have improved and is stable since last.  - Patient would benefit from SNF placement  - Charts, labs, vitals and imaging all reviewed.   - Pain Regimen: Per Primary  - Wound care orders placed  - Dressed wounds today.   - Dressings: Adaptic, 4x4,s ABD, Krelix, ACE.  - Nursing staff is able to change/reinforce dressing if & as necessary until next day’s dressing change.   - Pt is stable from our stand point and out care wont hold DC  - Podiatry will follow from af[facility]     Not is not final will discuss with Dr. Serina blanco for final signature.         Alvin Fitzgerald, LUDMILA, PGY-3      "

## 2023-10-24 LAB
ANION GAP SERPL CALC-SCNC: 10 MMOL/L (ref 10–20)
BUN SERPL-MCNC: 8 MG/DL (ref 6–23)
CALCIUM SERPL-MCNC: 7 MG/DL (ref 8.6–10.3)
CHLORIDE SERPL-SCNC: 97 MMOL/L (ref 98–107)
CO2 SERPL-SCNC: 28 MMOL/L (ref 21–32)
CREAT SERPL-MCNC: 0.89 MG/DL (ref 0.5–1.3)
ERYTHROCYTE [DISTWIDTH] IN BLOOD BY AUTOMATED COUNT: 15.5 % (ref 11.5–14.5)
GFR SERPL CREATININE-BSD FRML MDRD: >90 ML/MIN/1.73M*2
GLUCOSE SERPL-MCNC: 85 MG/DL (ref 74–99)
HCT VFR BLD AUTO: 24 % (ref 41–52)
HGB BLD-MCNC: 7.4 G/DL (ref 13.5–17.5)
MAGNESIUM SERPL-MCNC: 1.74 MG/DL (ref 1.6–2.4)
MCH RBC QN AUTO: 31.5 PG (ref 26–34)
MCHC RBC AUTO-ENTMCNC: 30.8 G/DL (ref 32–36)
MCV RBC AUTO: 102 FL (ref 80–100)
NRBC BLD-RTO: 0 /100 WBCS (ref 0–0)
PLATELET # BLD AUTO: 164 X10*3/UL (ref 150–450)
PMV BLD AUTO: 8.9 FL (ref 7.5–11.5)
POTASSIUM SERPL-SCNC: 3.4 MMOL/L (ref 3.5–5.3)
RBC # BLD AUTO: 2.35 X10*6/UL (ref 4.5–5.9)
SODIUM SERPL-SCNC: 132 MMOL/L (ref 136–145)
WBC # BLD AUTO: 4.8 X10*3/UL (ref 4.4–11.3)

## 2023-10-24 PROCEDURE — 2500000004 HC RX 250 GENERAL PHARMACY W/ HCPCS (ALT 636 FOR OP/ED): Performed by: PHYSICIAN ASSISTANT

## 2023-10-24 PROCEDURE — 36415 COLL VENOUS BLD VENIPUNCTURE: CPT | Performed by: NURSE PRACTITIONER

## 2023-10-24 PROCEDURE — 96372 THER/PROPH/DIAG INJ SC/IM: CPT | Performed by: PHYSICIAN ASSISTANT

## 2023-10-24 PROCEDURE — 94640 AIRWAY INHALATION TREATMENT: CPT

## 2023-10-24 PROCEDURE — 80048 BASIC METABOLIC PNL TOTAL CA: CPT | Performed by: NURSE PRACTITIONER

## 2023-10-24 PROCEDURE — 2500000004 HC RX 250 GENERAL PHARMACY W/ HCPCS (ALT 636 FOR OP/ED): Performed by: NURSE PRACTITIONER

## 2023-10-24 PROCEDURE — 2500000001 HC RX 250 WO HCPCS SELF ADMINISTERED DRUGS (ALT 637 FOR MEDICARE OP): Performed by: NURSE PRACTITIONER

## 2023-10-24 PROCEDURE — 1100000001 HC PRIVATE ROOM DAILY

## 2023-10-24 PROCEDURE — 2500000004 HC RX 250 GENERAL PHARMACY W/ HCPCS (ALT 636 FOR OP/ED): Performed by: INTERNAL MEDICINE

## 2023-10-24 PROCEDURE — 99232 SBSQ HOSP IP/OBS MODERATE 35: CPT | Performed by: INTERNAL MEDICINE

## 2023-10-24 PROCEDURE — 2500000001 HC RX 250 WO HCPCS SELF ADMINISTERED DRUGS (ALT 637 FOR MEDICARE OP): Performed by: PHYSICIAN ASSISTANT

## 2023-10-24 PROCEDURE — 83735 ASSAY OF MAGNESIUM: CPT | Performed by: INTERNAL MEDICINE

## 2023-10-24 PROCEDURE — 85027 COMPLETE CBC AUTOMATED: CPT | Performed by: NURSE PRACTITIONER

## 2023-10-24 RX ORDER — MAGNESIUM SULFATE HEPTAHYDRATE 40 MG/ML
2 INJECTION, SOLUTION INTRAVENOUS ONCE
Status: COMPLETED | OUTPATIENT
Start: 2023-10-24 | End: 2023-10-24

## 2023-10-24 RX ADMIN — METOPROLOL SUCCINATE 50 MG: 50 TABLET, EXTENDED RELEASE ORAL at 21:17

## 2023-10-24 RX ADMIN — ACETAMINOPHEN 650 MG: 325 TABLET ORAL at 09:21

## 2023-10-24 RX ADMIN — VANCOMYCIN HYDROCHLORIDE 125 MG: 125 CAPSULE ORAL at 13:00

## 2023-10-24 RX ADMIN — MEROPENEM 1 G: 1 INJECTION, POWDER, FOR SOLUTION INTRAVENOUS at 08:00

## 2023-10-24 RX ADMIN — SULFAMETHOXAZOLE AND TRIMETHOPRIM 1 TABLET: 800; 160 TABLET ORAL at 09:22

## 2023-10-24 RX ADMIN — POLYETHYLENE GLYCOL 3350 17 G: 17 POWDER, FOR SOLUTION ORAL at 09:22

## 2023-10-24 RX ADMIN — VANCOMYCIN HYDROCHLORIDE 125 MG: 125 CAPSULE ORAL at 17:00

## 2023-10-24 RX ADMIN — ENOXAPARIN SODIUM 60 MG: 80 INJECTION SUBCUTANEOUS at 21:17

## 2023-10-24 RX ADMIN — MEROPENEM 1 G: 1 INJECTION, POWDER, FOR SOLUTION INTRAVENOUS at 23:24

## 2023-10-24 RX ADMIN — FUROSEMIDE 20 MG: 10 INJECTION, SOLUTION INTRAMUSCULAR; INTRAVENOUS at 09:23

## 2023-10-24 RX ADMIN — MAGNESIUM OXIDE TAB 400 MG (241.3 MG ELEMENTAL MG) 400 MG: 400 (241.3 MG) TAB at 09:22

## 2023-10-24 RX ADMIN — DOCUSATE SODIUM 100 MG: 100 CAPSULE, LIQUID FILLED ORAL at 09:23

## 2023-10-24 RX ADMIN — SULFAMETHOXAZOLE AND TRIMETHOPRIM 1 TABLET: 800; 160 TABLET ORAL at 21:17

## 2023-10-24 RX ADMIN — NYSTATIN: 100000 POWDER TOPICAL at 09:00

## 2023-10-24 RX ADMIN — VANCOMYCIN HYDROCHLORIDE 125 MG: 125 CAPSULE ORAL at 21:16

## 2023-10-24 RX ADMIN — ACETAMINOPHEN 650 MG: 325 TABLET ORAL at 00:25

## 2023-10-24 RX ADMIN — VANCOMYCIN HYDROCHLORIDE 125 MG: 125 CAPSULE ORAL at 08:00

## 2023-10-24 RX ADMIN — Medication 1 TABLET: at 09:22

## 2023-10-24 RX ADMIN — MEROPENEM 1 G: 1 INJECTION, POWDER, FOR SOLUTION INTRAVENOUS at 16:00

## 2023-10-24 RX ADMIN — ACETAMINOPHEN 650 MG: 325 TABLET ORAL at 23:29

## 2023-10-24 RX ADMIN — ENOXAPARIN SODIUM 60 MG: 80 INJECTION SUBCUTANEOUS at 09:23

## 2023-10-24 RX ADMIN — FLUTICASONE FUROATE AND VILANTEROL TRIFENATATE 1 PUFF: 200; 25 POWDER RESPIRATORY (INHALATION) at 07:30

## 2023-10-24 RX ADMIN — MAGNESIUM SULFATE HEPTAHYDRATE 2 G: 40 INJECTION, SOLUTION INTRAVENOUS at 21:12

## 2023-10-24 ASSESSMENT — COGNITIVE AND FUNCTIONAL STATUS - GENERAL
DRESSING REGULAR LOWER BODY CLOTHING: A LITTLE
DRESSING REGULAR LOWER BODY CLOTHING: A LITTLE
PERSONAL GROOMING: A LITTLE
WALKING IN HOSPITAL ROOM: A LITTLE
MOBILITY SCORE: 17
TURNING FROM BACK TO SIDE WHILE IN FLAT BAD: A LITTLE
DRESSING REGULAR UPPER BODY CLOTHING: A LITTLE
DRESSING REGULAR UPPER BODY CLOTHING: A LITTLE
MOBILITY SCORE: 17
WALKING IN HOSPITAL ROOM: A LITTLE
TOILETING: A LOT
STANDING UP FROM CHAIR USING ARMS: A LITTLE
MOVING FROM LYING ON BACK TO SITTING ON SIDE OF FLAT BED WITH BEDRAILS: A LITTLE
TOILETING: A LOT
TURNING FROM BACK TO SIDE WHILE IN FLAT BAD: A LITTLE
MOVING FROM LYING ON BACK TO SITTING ON SIDE OF FLAT BED WITH BEDRAILS: A LITTLE
DAILY ACTIVITIY SCORE: 18
HELP NEEDED FOR BATHING: A LITTLE
MOVING TO AND FROM BED TO CHAIR: A LITTLE
STANDING UP FROM CHAIR USING ARMS: A LITTLE
CLIMB 3 TO 5 STEPS WITH RAILING: A LOT
CLIMB 3 TO 5 STEPS WITH RAILING: A LOT
HELP NEEDED FOR BATHING: A LITTLE
DAILY ACTIVITIY SCORE: 18
MOVING TO AND FROM BED TO CHAIR: A LITTLE
PERSONAL GROOMING: A LITTLE

## 2023-10-24 ASSESSMENT — PAIN SCALES - GENERAL
PAINLEVEL_OUTOF10: 0 - NO PAIN
PAINLEVEL_OUTOF10: 0 - NO PAIN
PAINLEVEL_OUTOF10: 4
PAINLEVEL_OUTOF10: 3

## 2023-10-24 ASSESSMENT — PAIN - FUNCTIONAL ASSESSMENT
PAIN_FUNCTIONAL_ASSESSMENT: 0-10

## 2023-10-24 NOTE — CARE PLAN
The patient's goals for the shift include  pain management    The clinical goals for the shift include pt will have no complaints of pain throughout the night shift

## 2023-10-24 NOTE — CARE PLAN
The patient's goals for the shift include  pain control    The clinical goals for the shift include pt will have no complaints of pain throughout the night shift

## 2023-10-24 NOTE — PROGRESS NOTES
Chris Chance is a 42 y.o. male on day 4 of admission presenting with Generalized weakness.      Subjective   No events overnight. Patient seen and examined at bedside. No complaints. Discussed care with mother on phone.     Objective     Last Recorded Vitals  /69   Pulse 96   Temp 36.4 °C (97.5 °F) (Temporal)   Resp 19   Wt (!) 184 kg (406 lb 8.5 oz)   SpO2 96%   Intake/Output last 3 Shifts:    Intake/Output Summary (Last 24 hours) at 10/24/2023 0631  Last data filed at 10/23/2023 2348  Gross per 24 hour   Intake 540 ml   Output --   Net 540 ml         Admission Weight  Weight: (!) 181 kg (400 lb) (10/19/23 1120)    Daily Weight  10/24/23 : (!) 184 kg (406 lb 8.5 oz)    Image Results  ECG 12 lead  Sinus tachycardia  Abnormal R-wave progression, early transition  Abnormal T, consider ischemia, anterior leads    Confirmed by Ramicone, James ([id number]) on 10/7/2023 7:33:46 PM      Physical Exam  Constitutional:       General: He is not in acute distress.     Appearance: He is obese. He is not toxic-appearing.      Comments: Patient currently sitting up in a wheelchair.  Mother is at bedside.   HENT:      Head: Normocephalic and atraumatic.      Mouth/Throat:      Mouth: Mucous membranes are moist.      Pharynx: Oropharynx is clear.   Eyes:      Extraocular Movements: Extraocular movements intact.      Conjunctiva/sclera: Conjunctivae normal.      Pupils: Pupils are equal, round, and reactive to light.   Cardiovascular:      Rate and Rhythm: Normal rate and regular rhythm.   Pulmonary:      Comments: Diminished breath sounds due to body habitus.  Some slight wheezing noted.  Abdominal:      General: Bowel sounds are normal. There is distension.      Tenderness: There is abdominal tenderness (Generalized tenderness to abdominal area.). There is no guarding.   Musculoskeletal:         General: Swelling present.      Right lower leg: Edema present.      Left lower leg: Edema present.   Skin:     General: Skin  is warm and dry.      Comments: Discoloration, edema, and swelling to bilateral lower extremities.  Wound on bottom left heel.  Dressing still on legs, going to wait for patient to be placed in a room and examined by podiatry to undress legs fully due to patient being in pain.   Neurological:      General: No focal deficit present.      Mental Status: He is alert and oriented to person, place, and time.   Psychiatric:         Mood and Affect: Mood normal.         Behavior: Behavior normal.     Relevant Results               Assessment/Plan        Principal Problem:    Generalized weakness  Active Problems:    Multiple open wounds of lower extremity, complicated, sequela    Unable to ambulate    Chris Chance is a 42 y.o. male with past medical history significant for morbid obesity, hypertension, hyperlipidemia, gout, asthma, and MICHAEL who presents with bilateral lower extremity pain and wounds.  Presents today due to inability to care for himself at home, states that house was not prepared for him after discharge from nursing home and patient want to be placed somewhere after discharge from hospital.  Recent Tissue/Wound culture on 10/13/2023 grew multiple MDRO's including abundant Enterobacter clocae complex & Moderate Acinetobacter calcoaceticus-baumannii complex.  Was taken vancomycin and then switched to oral Bactrim which she is still taking.  Patient states he had a recent surgical debridement done on September 29. Has been in nursing home and working with PT/OT for the past couple weeks.  States he tested positive for COVID about a week ago as well.  Was released from the nursing home yesterday and when he got back home he had difficulty getting up the stairs into the house and also had difficulty getting around inside the house.  States there was no new bigger bed and also no commode, says the house is not prepared for him.  Patient was ambulating around with a walker at home.       #History of Bilateral  leg wounds-edematous  #Leukocytosis, WBC 14.1 today  Podiatry consult, no surgical indication  ID consulted, appreciate recs -- change antibiotics to Merrem and Bactrim  Previous cultures reviewed  Social work consult for placement   Admit for observation  Q8 vitals  Cardiac diet  CBC, BMP in a.m.  ESR, CRP added on  Urinalysis  Tylenol as needed for mild pain  Oxycodone as needed for moderate pain     #Hypomagnesemia, level 1.33 today  Magnesium replacement  Repeat magnesium in the a.m.     #Shortness of breath, history of asthma  Albuterol as needed for shortness of breath or wheezing  Breo Ellipta     #Constipation  Colace, MiraLAX     #weakness in lower extremities  PT/OT for evaluation and treatment      Continue patient's at home medications as appropriate     Chronic conditions:  #Obesity  #Hypertension  #Hyperlipidemia  #Gout  #MICHAEL  #Asthma     #DVT prophylaxis  Lovenox  Ambulation as tolerated with walker       Dispo: will need SNF for IV antibiotics        Erik Avila,

## 2023-10-24 NOTE — PROGRESS NOTES
TCC Note: Met with pt at bedside in regarding to LTC referrals. Pt asked me to please call his Mother regarding the updates. Spoke with Mother and updated her on the referrals that were already placed. She had asked for us to please place another referral to a different LTC facility. Requested that PCN place referrals. Will follow. Gill Barboza, MSN, RN, TCC.

## 2023-10-24 NOTE — PROGRESS NOTES
10/24/23 1822   Discharge Planning   Type of Post Acute Facility Services Skilled nursing   Patient expects to be discharged to: SNF   Does the patient need discharge transport arranged? Yes   RoundTrip coordination needed? Yes     1045:  Notified by [facility] to send a referral to the [facility] [place].  Referral will be submitted for review.     1537:  Received responses from the [facility] [place] not able to accept, [facility] can accept.  Per TCC [facility] is Facility preference.  Will need precert.      1630:  [facility] has submitted for precert.

## 2023-10-24 NOTE — CARE PLAN
The patient's goals for the shift include  has no pain this shift    The clinical goals for the shift include patient remain free from injury or pain this shift.

## 2023-10-25 ENCOUNTER — APPOINTMENT (OUTPATIENT)
Dept: WOUND CARE | Facility: CLINIC | Age: 43
End: 2023-10-25
Payer: COMMERCIAL

## 2023-10-25 PROBLEM — R53.1 GENERALIZED WEAKNESS: Status: RESOLVED | Noted: 2023-10-19 | Resolved: 2023-10-25

## 2023-10-25 LAB
ANION GAP SERPL CALC-SCNC: 13 MMOL/L (ref 10–20)
BUN SERPL-MCNC: 7 MG/DL (ref 6–23)
CALCIUM SERPL-MCNC: 7 MG/DL (ref 8.6–10.3)
CHLORIDE SERPL-SCNC: 98 MMOL/L (ref 98–107)
CO2 SERPL-SCNC: 26 MMOL/L (ref 21–32)
CREAT SERPL-MCNC: 0.78 MG/DL (ref 0.5–1.3)
ERYTHROCYTE [DISTWIDTH] IN BLOOD BY AUTOMATED COUNT: 15.4 % (ref 11.5–14.5)
GFR SERPL CREATININE-BSD FRML MDRD: >90 ML/MIN/1.73M*2
GLUCOSE SERPL-MCNC: 98 MG/DL (ref 74–99)
HCT VFR BLD AUTO: 24 % (ref 41–52)
HGB BLD-MCNC: 7.5 G/DL (ref 13.5–17.5)
MCH RBC QN AUTO: 31 PG (ref 26–34)
MCHC RBC AUTO-ENTMCNC: 31.3 G/DL (ref 32–36)
MCV RBC AUTO: 99 FL (ref 80–100)
NRBC BLD-RTO: 0 /100 WBCS (ref 0–0)
PLATELET # BLD AUTO: 168 X10*3/UL (ref 150–450)
PMV BLD AUTO: 9.1 FL (ref 7.5–11.5)
POTASSIUM SERPL-SCNC: 3.8 MMOL/L (ref 3.5–5.3)
RBC # BLD AUTO: 2.42 X10*6/UL (ref 4.5–5.9)
SODIUM SERPL-SCNC: 133 MMOL/L (ref 136–145)
WBC # BLD AUTO: 4.5 X10*3/UL (ref 4.4–11.3)

## 2023-10-25 PROCEDURE — 2500000001 HC RX 250 WO HCPCS SELF ADMINISTERED DRUGS (ALT 637 FOR MEDICARE OP)

## 2023-10-25 PROCEDURE — 82435 ASSAY OF BLOOD CHLORIDE: CPT | Performed by: NURSE PRACTITIONER

## 2023-10-25 PROCEDURE — 94640 AIRWAY INHALATION TREATMENT: CPT

## 2023-10-25 PROCEDURE — 1100000001 HC PRIVATE ROOM DAILY

## 2023-10-25 PROCEDURE — 2500000004 HC RX 250 GENERAL PHARMACY W/ HCPCS (ALT 636 FOR OP/ED): Performed by: PHYSICIAN ASSISTANT

## 2023-10-25 PROCEDURE — 2500000001 HC RX 250 WO HCPCS SELF ADMINISTERED DRUGS (ALT 637 FOR MEDICARE OP): Performed by: NURSE PRACTITIONER

## 2023-10-25 PROCEDURE — 85027 COMPLETE CBC AUTOMATED: CPT | Performed by: NURSE PRACTITIONER

## 2023-10-25 PROCEDURE — 2500000004 HC RX 250 GENERAL PHARMACY W/ HCPCS (ALT 636 FOR OP/ED): Performed by: NURSE PRACTITIONER

## 2023-10-25 PROCEDURE — 99232 SBSQ HOSP IP/OBS MODERATE 35: CPT | Performed by: INTERNAL MEDICINE

## 2023-10-25 PROCEDURE — 96372 THER/PROPH/DIAG INJ SC/IM: CPT | Performed by: PHYSICIAN ASSISTANT

## 2023-10-25 PROCEDURE — 2500000004 HC RX 250 GENERAL PHARMACY W/ HCPCS (ALT 636 FOR OP/ED): Performed by: INTERNAL MEDICINE

## 2023-10-25 PROCEDURE — 36415 COLL VENOUS BLD VENIPUNCTURE: CPT | Performed by: NURSE PRACTITIONER

## 2023-10-25 PROCEDURE — 2500000001 HC RX 250 WO HCPCS SELF ADMINISTERED DRUGS (ALT 637 FOR MEDICARE OP): Performed by: PHYSICIAN ASSISTANT

## 2023-10-25 RX ORDER — ADHESIVE BANDAGE
30 BANDAGE TOPICAL ONCE
Status: COMPLETED | OUTPATIENT
Start: 2023-10-25 | End: 2023-10-25

## 2023-10-25 RX ORDER — FUROSEMIDE 10 MG/ML
20 INJECTION INTRAMUSCULAR; INTRAVENOUS DAILY
Start: 2023-10-26 | End: 2023-11-09 | Stop reason: SDUPTHER

## 2023-10-25 RX ORDER — SULFAMETHOXAZOLE AND TRIMETHOPRIM 800; 160 MG/1; MG/1
1 TABLET ORAL 2 TIMES DAILY
Qty: 14 TABLET | Refills: 0
Start: 2023-10-25 | End: 2023-11-01

## 2023-10-25 RX ORDER — LANOLIN ALCOHOL/MO/W.PET/CERES
400 CREAM (GRAM) TOPICAL DAILY
Start: 2023-10-26 | End: 2023-11-09 | Stop reason: SDUPTHER

## 2023-10-25 RX ADMIN — DOCUSATE SODIUM 100 MG: 100 CAPSULE, LIQUID FILLED ORAL at 20:41

## 2023-10-25 RX ADMIN — MAGNESIUM HYDROXIDE 30 ML: 400 SUSPENSION ORAL at 16:05

## 2023-10-25 RX ADMIN — VANCOMYCIN HYDROCHLORIDE 125 MG: 125 CAPSULE ORAL at 12:31

## 2023-10-25 RX ADMIN — Medication 1 TABLET: at 09:02

## 2023-10-25 RX ADMIN — FLUTICASONE FUROATE AND VILANTEROL TRIFENATATE 1 PUFF: 200; 25 POWDER RESPIRATORY (INHALATION) at 07:10

## 2023-10-25 RX ADMIN — ACETAMINOPHEN 650 MG: 325 TABLET ORAL at 12:29

## 2023-10-25 RX ADMIN — DOCUSATE SODIUM 100 MG: 100 CAPSULE, LIQUID FILLED ORAL at 09:01

## 2023-10-25 RX ADMIN — ENOXAPARIN SODIUM 60 MG: 80 INJECTION SUBCUTANEOUS at 09:02

## 2023-10-25 RX ADMIN — FUROSEMIDE 20 MG: 10 INJECTION, SOLUTION INTRAMUSCULAR; INTRAVENOUS at 09:01

## 2023-10-25 RX ADMIN — NYSTATIN: 100000 POWDER TOPICAL at 21:00

## 2023-10-25 RX ADMIN — POLYETHYLENE GLYCOL 3350 17 G: 17 POWDER, FOR SOLUTION ORAL at 09:02

## 2023-10-25 RX ADMIN — SULFAMETHOXAZOLE AND TRIMETHOPRIM 1 TABLET: 800; 160 TABLET ORAL at 09:01

## 2023-10-25 RX ADMIN — ACETAMINOPHEN 650 MG: 325 TABLET ORAL at 04:35

## 2023-10-25 RX ADMIN — MEROPENEM 1 G: 1 INJECTION, POWDER, FOR SOLUTION INTRAVENOUS at 09:02

## 2023-10-25 RX ADMIN — ENOXAPARIN SODIUM 60 MG: 80 INJECTION SUBCUTANEOUS at 20:41

## 2023-10-25 RX ADMIN — ACETAMINOPHEN 650 MG: 325 TABLET ORAL at 19:50

## 2023-10-25 RX ADMIN — METOPROLOL SUCCINATE 50 MG: 50 TABLET, EXTENDED RELEASE ORAL at 20:41

## 2023-10-25 RX ADMIN — VANCOMYCIN HYDROCHLORIDE 125 MG: 125 CAPSULE ORAL at 07:05

## 2023-10-25 RX ADMIN — SULFAMETHOXAZOLE AND TRIMETHOPRIM 1 TABLET: 800; 160 TABLET ORAL at 20:41

## 2023-10-25 RX ADMIN — ACETAMINOPHEN 650 MG: 325 TABLET ORAL at 17:40

## 2023-10-25 RX ADMIN — MAGNESIUM OXIDE TAB 400 MG (241.3 MG ELEMENTAL MG) 400 MG: 400 (241.3 MG) TAB at 09:01

## 2023-10-25 ASSESSMENT — PAIN SCALES - GENERAL
PAINLEVEL_OUTOF10: 5 - MODERATE PAIN
PAINLEVEL_OUTOF10: 7
PAINLEVEL_OUTOF10: 3
PAINLEVEL_OUTOF10: 4
PAINLEVEL_OUTOF10: 1
PAINLEVEL_OUTOF10: 0 - NO PAIN
PAINLEVEL_OUTOF10: 2
PAINLEVEL_OUTOF10: 1
PAINLEVEL_OUTOF10: 5 - MODERATE PAIN

## 2023-10-25 ASSESSMENT — COGNITIVE AND FUNCTIONAL STATUS - GENERAL
HELP NEEDED FOR BATHING: A LITTLE
TURNING FROM BACK TO SIDE WHILE IN FLAT BAD: A LITTLE
MOBILITY SCORE: 18
TURNING FROM BACK TO SIDE WHILE IN FLAT BAD: A LITTLE
MOVING FROM LYING ON BACK TO SITTING ON SIDE OF FLAT BED WITH BEDRAILS: A LITTLE
TOILETING: A LITTLE
MOBILITY SCORE: 18
HELP NEEDED FOR BATHING: A LITTLE
TURNING FROM BACK TO SIDE WHILE IN FLAT BAD: A LITTLE
PERSONAL GROOMING: A LITTLE
DAILY ACTIVITIY SCORE: 19
DRESSING REGULAR UPPER BODY CLOTHING: A LITTLE
TOILETING: A LITTLE
TOILETING: A LITTLE
MOVING TO AND FROM BED TO CHAIR: A LITTLE
DRESSING REGULAR UPPER BODY CLOTHING: A LITTLE
PERSONAL GROOMING: A LITTLE
CLIMB 3 TO 5 STEPS WITH RAILING: A LITTLE
DRESSING REGULAR LOWER BODY CLOTHING: A LITTLE
DRESSING REGULAR LOWER BODY CLOTHING: A LITTLE
DAILY ACTIVITIY SCORE: 19
MOVING FROM LYING ON BACK TO SITTING ON SIDE OF FLAT BED WITH BEDRAILS: A LITTLE
WALKING IN HOSPITAL ROOM: A LITTLE
MOVING FROM LYING ON BACK TO SITTING ON SIDE OF FLAT BED WITH BEDRAILS: A LITTLE
MOVING TO AND FROM BED TO CHAIR: A LITTLE
STANDING UP FROM CHAIR USING ARMS: A LITTLE
PERSONAL GROOMING: A LITTLE
DRESSING REGULAR LOWER BODY CLOTHING: A LITTLE
STANDING UP FROM CHAIR USING ARMS: A LITTLE
WALKING IN HOSPITAL ROOM: A LITTLE
DRESSING REGULAR UPPER BODY CLOTHING: A LITTLE
STANDING UP FROM CHAIR USING ARMS: A LITTLE
CLIMB 3 TO 5 STEPS WITH RAILING: A LITTLE
WALKING IN HOSPITAL ROOM: A LITTLE
DAILY ACTIVITIY SCORE: 19
MOBILITY SCORE: 18
HELP NEEDED FOR BATHING: A LITTLE
MOVING TO AND FROM BED TO CHAIR: A LITTLE
CLIMB 3 TO 5 STEPS WITH RAILING: A LITTLE

## 2023-10-25 ASSESSMENT — PAIN - FUNCTIONAL ASSESSMENT
PAIN_FUNCTIONAL_ASSESSMENT: 0-10

## 2023-10-25 NOTE — PROGRESS NOTES
TCC Note: Pt was accepted by [facility] and Liaison was here to do Bedside Evaluation earlier today. Waiting on precert that was escalated. Will follow. Gill Barboza, MSN, RN, TCC.

## 2023-10-25 NOTE — PROGRESS NOTES
Infectious disease progress note  Subjective   Bilateral lymphedema  Chronic venous stasis  Cellulitis of legs, Enterobacter, multidrug-resistant Acinetobacter on 10/13/2023    Antibiotics  Merrem day 6 will discontinue  Bactrim day 7 out of 14  Oral Vanco    Objective   Range of Vitals (last 24 hours)  Heart Rate:  [102-107]   Temp:  [35.5 °C (95.9 °F)-36.2 °C (97.2 °F)]   BP: (129-131)/(58-64)   Weight:  [183 kg (404 lb 5.2 oz)]   SpO2:  [94 %-97 %]   Daily Weight  10/25/23 : (!) 183 kg (404 lb 5.2 oz)    Body mass index is 63.33 kg/m².      Physical Exam  Patient sitting in chair is quite confused about his medical issues that I was discussing with him  HEENT PERRLA  Chest Tw[place] bilaterally  CVS S1-S2 regular  Abdomen obese soft nontender positive bowel sound  Extremities both legs are wrapped and he does have swelling bilaterally      Relevant Results  Labs  Lab Results   Component Value Date    WBC 4.5 10/25/2023    HGB 7.5 (L) 10/25/2023    HCT 24.0 (L) 10/25/2023    MCV 99 10/25/2023     10/25/2023     Lab Results   Component Value Date    GLUCOSE 98 10/25/2023    CALCIUM 7.0 (L) 10/25/2023     (L) 10/25/2023    K 3.8 10/25/2023    CO2 26 10/25/2023    CL 98 10/25/2023    BUN 7 10/25/2023    CREATININE 0.78 10/25/2023   ESR: --  Lab Results   Component Value Date    SEDRATE 44 (H) 10/19/2023     Lab Results   Component Value Date    CRP 1.66 (H) 10/19/2023     Lab Results   Component Value Date    ALT 28 10/19/2023    AST 56 (H) 10/19/2023    ALKPHOS 98 10/19/2023    BILITOT 0.5 10/19/2023       Microbiology    Reviewed by me  Imaging  Reviewed by me    Assessment/Plan   1.  We will discontinue meropenem and continue oral Bactrim for another 7 days.  I will discuss with the primary care physician on whether patient needs oral Vanco.  The patient denies ever having C. difficile colitis so I will need to know whether the patient had this in the past or not    I have reviewed and interpreted all  lab tests imaging studies and documentations from other healthcare providers.  I am monitoring for side effects regarding the antibiotics and toxicity    Mirna Dale MD

## 2023-10-25 NOTE — SIGNIFICANT EVENT
Patient has wet/wheeping wounds to BLE. Order from Podiatry is to use Adaptic directly on the wounds (wet >wet). Per hospital policy wet wounds should be covered with calcium algenate (wet>dry). Dr. Fitzgerald stated that using calcium algenate is acceptable.

## 2023-10-25 NOTE — PROGRESS NOTES
10/25/23 0838   Discharge Planning   Type of Post Acute Facility Services Skilled nursing   Patient expects to be discharged to: [facility]   Does the patient need discharge transport arranged? Yes   RoundTrip coordination needed? Yes     0838:  Precert escalated     1515:  Received response from [facility] just called [facility] to verify benefits, and found out we are not in network. Talked with Phill BECKWITH at [facility], ref for the call-[phone]. He has [employer].  Reached out to [facility] for acceptance.

## 2023-10-25 NOTE — PROGRESS NOTES
Chris Chance is a 42 y.o. male on day 4 of admission presenting with Generalized weakness.      Subjective   No events overnight. Patient seen and examined at bedside. No complaints. Discussed care with mother on phone. Care discussed with Podiatry.     Objective     Last Recorded Vitals  /58   Pulse 107   Temp 36.2 °C (97.2 °F)   Resp 20   Wt (!) 184 kg (406 lb 8.5 oz)   SpO2 97%   Intake/Output last 3 Shifts:    Intake/Output Summary (Last 24 hours) at 10/24/2023 2018  Last data filed at 10/23/2023 2348  Gross per 24 hour   Intake 300 ml   Output --   Net 300 ml         Admission Weight  Weight: (!) 181 kg (400 lb) (10/19/23 1120)    Daily Weight  10/24/23 : (!) 184 kg (406 lb 8.5 oz)    Image Results  ECG 12 lead  Sinus tachycardia  Abnormal R-wave progression, early transition  Abnormal T, consider ischemia, anterior leads    Confirmed by Ramicone, James ([id number]) on 10/7/2023 7:33:46 PM      Physical Exam  Constitutional:       General: He is not in acute distress.     Appearance: He is obese. He is not toxic-appearing.      Comments: Patient currently sitting up in a wheelchair.  Mother is at bedside.   HENT:      Head: Normocephalic and atraumatic.      Mouth/Throat:      Mouth: Mucous membranes are moist.      Pharynx: Oropharynx is clear.   Eyes:      Extraocular Movements: Extraocular movements intact.      Conjunctiva/sclera: Conjunctivae normal.      Pupils: Pupils are equal, round, and reactive to light.   Cardiovascular:      Rate and Rhythm: Normal rate and regular rhythm.   Pulmonary:      Comments: Diminished breath sounds due to body habitus.  Some slight wheezing noted.  Abdominal:      General: Bowel sounds are normal. There is distension.      Tenderness: There is abdominal tenderness (Generalized tenderness to abdominal area.). There is no guarding.   Musculoskeletal:         General: Swelling present.      Right lower leg: Edema present.      Left lower leg: Edema present.    Skin:     General: Skin is warm and dry.      Comments: Discoloration, edema, and swelling to bilateral lower extremities.  Wound on bottom left heel.  Dressing still on legs, going to wait for patient to be placed in a room and examined by podiatry to undress legs fully due to patient being in pain.   Neurological:      General: No focal deficit present.      Mental Status: He is alert and oriented to person, place, and time.   Psychiatric:         Mood and Affect: Mood normal.         Behavior: Behavior normal.     Relevant Results               Assessment/Plan        Principal Problem:    Generalized weakness  Active Problems:    Multiple open wounds of lower extremity, complicated, sequela    Unable to ambulate    Chris Chance is a 42 y.o. male with past medical history significant for morbid obesity, hypertension, hyperlipidemia, gout, asthma, and MICHAEL who presents with bilateral lower extremity pain and wounds.  Presents today due to inability to care for himself at home, states that house was not prepared for him after discharge from nursing home and patient want to be placed somewhere after discharge from hospital.  Recent Tissue/Wound culture on 10/13/2023 grew multiple MDRO's including abundant Enterobacter clocae complex & Moderate Acinetobacter calcoaceticus-baumannii complex.  Was taken vancomycin and then switched to oral Bactrim which she is still taking.  Patient states he had a recent surgical debridement done on September 29. Has been in nursing home and working with PT/OT for the past couple weeks.  States he tested positive for COVID about a week ago as well.  Was released from the nursing home yesterday and when he got back home he had difficulty getting up the stairs into the house and also had difficulty getting around inside the house.  States there was no new bigger bed and also no commode, says the house is not prepared for him.  Patient was ambulating around with a walker at home.        #History of Bilateral leg wounds-edematous  #Leukocytosis, WBC 14.1 today  Podiatry consult, no surgical indication  ID consulted, appreciate recs -- change antibiotics to Merrem and Bactrim  Previous cultures reviewed  Social work consult for placement   Admit for observation  Q8 vitals  Cardiac diet  CBC, BMP in a.m.  ESR, CRP added on  Urinalysis  Tylenol as needed for mild pain  Oxycodone as needed for moderate pain     #Hypomagnesemia, level 1.33 today  Magnesium replacement  Repeat magnesium in the a.m.     #Shortness of breath, history of asthma  Albuterol as needed for shortness of breath or wheezing  Breo Ellipta     #Constipation  Colace, MiraLAX     #weakness in lower extremities  PT/OT for evaluation and treatment      Continue patient's at home medications as appropriate     Chronic conditions:  #Obesity  #Hypertension  #Hyperlipidemia  #Gout  #MICHAEL  #Asthma     #DVT prophylaxis  Lovenox  Ambulation as tolerated with walker       Dispo: will need SNF for IV antibiotics, awaiting precert to Kannan Avila DO

## 2023-10-26 LAB
ANION GAP SERPL CALC-SCNC: 13 MMOL/L (ref 10–20)
BUN SERPL-MCNC: 7 MG/DL (ref 6–23)
CALCIUM SERPL-MCNC: 7.1 MG/DL (ref 8.6–10.3)
CHLORIDE SERPL-SCNC: 97 MMOL/L (ref 98–107)
CO2 SERPL-SCNC: 26 MMOL/L (ref 21–32)
CREAT SERPL-MCNC: 0.75 MG/DL (ref 0.5–1.3)
ERYTHROCYTE [DISTWIDTH] IN BLOOD BY AUTOMATED COUNT: 15.7 % (ref 11.5–14.5)
GFR SERPL CREATININE-BSD FRML MDRD: >90 ML/MIN/1.73M*2
GLUCOSE SERPL-MCNC: 94 MG/DL (ref 74–99)
HCT VFR BLD AUTO: 23.2 % (ref 41–52)
HGB BLD-MCNC: 7.2 G/DL (ref 13.5–17.5)
MCH RBC QN AUTO: 31.2 PG (ref 26–34)
MCHC RBC AUTO-ENTMCNC: 31 G/DL (ref 32–36)
MCV RBC AUTO: 100 FL (ref 80–100)
NRBC BLD-RTO: 0 /100 WBCS (ref 0–0)
PLATELET # BLD AUTO: 184 X10*3/UL (ref 150–450)
PMV BLD AUTO: 9 FL (ref 7.5–11.5)
POTASSIUM SERPL-SCNC: 4 MMOL/L (ref 3.5–5.3)
RBC # BLD AUTO: 2.31 X10*6/UL (ref 4.5–5.9)
SODIUM SERPL-SCNC: 132 MMOL/L (ref 136–145)
WBC # BLD AUTO: 5 X10*3/UL (ref 4.4–11.3)

## 2023-10-26 PROCEDURE — 96372 THER/PROPH/DIAG INJ SC/IM: CPT | Performed by: PHYSICIAN ASSISTANT

## 2023-10-26 PROCEDURE — 85027 COMPLETE CBC AUTOMATED: CPT | Performed by: NURSE PRACTITIONER

## 2023-10-26 PROCEDURE — 2500000004 HC RX 250 GENERAL PHARMACY W/ HCPCS (ALT 636 FOR OP/ED): Performed by: PHYSICIAN ASSISTANT

## 2023-10-26 PROCEDURE — 36415 COLL VENOUS BLD VENIPUNCTURE: CPT | Performed by: NURSE PRACTITIONER

## 2023-10-26 PROCEDURE — 1100000001 HC PRIVATE ROOM DAILY

## 2023-10-26 PROCEDURE — 97116 GAIT TRAINING THERAPY: CPT | Mod: GP,CQ

## 2023-10-26 PROCEDURE — 80048 BASIC METABOLIC PNL TOTAL CA: CPT | Performed by: NURSE PRACTITIONER

## 2023-10-26 PROCEDURE — 2500000004 HC RX 250 GENERAL PHARMACY W/ HCPCS (ALT 636 FOR OP/ED): Performed by: INTERNAL MEDICINE

## 2023-10-26 PROCEDURE — 2500000004 HC RX 250 GENERAL PHARMACY W/ HCPCS (ALT 636 FOR OP/ED): Performed by: NURSE PRACTITIONER

## 2023-10-26 PROCEDURE — 2500000001 HC RX 250 WO HCPCS SELF ADMINISTERED DRUGS (ALT 637 FOR MEDICARE OP): Performed by: NURSE PRACTITIONER

## 2023-10-26 PROCEDURE — 2500000001 HC RX 250 WO HCPCS SELF ADMINISTERED DRUGS (ALT 637 FOR MEDICARE OP): Performed by: PHYSICIAN ASSISTANT

## 2023-10-26 PROCEDURE — 99232 SBSQ HOSP IP/OBS MODERATE 35: CPT | Performed by: INTERNAL MEDICINE

## 2023-10-26 PROCEDURE — 94640 AIRWAY INHALATION TREATMENT: CPT

## 2023-10-26 PROCEDURE — 97535 SELF CARE MNGMENT TRAINING: CPT | Mod: CO,GO

## 2023-10-26 RX ADMIN — ACETAMINOPHEN 650 MG: 325 TABLET ORAL at 14:34

## 2023-10-26 RX ADMIN — DOCUSATE SODIUM 100 MG: 100 CAPSULE, LIQUID FILLED ORAL at 10:50

## 2023-10-26 RX ADMIN — ACETAMINOPHEN 650 MG: 325 TABLET ORAL at 10:49

## 2023-10-26 RX ADMIN — ENOXAPARIN SODIUM 60 MG: 80 INJECTION SUBCUTANEOUS at 10:50

## 2023-10-26 RX ADMIN — ACETAMINOPHEN 650 MG: 325 TABLET ORAL at 01:04

## 2023-10-26 RX ADMIN — SULFAMETHOXAZOLE AND TRIMETHOPRIM 1 TABLET: 800; 160 TABLET ORAL at 20:30

## 2023-10-26 RX ADMIN — FLUTICASONE FUROATE AND VILANTEROL TRIFENATATE 1 PUFF: 200; 25 POWDER RESPIRATORY (INHALATION) at 08:13

## 2023-10-26 RX ADMIN — DOCUSATE SODIUM 100 MG: 100 CAPSULE, LIQUID FILLED ORAL at 20:30

## 2023-10-26 RX ADMIN — METOPROLOL SUCCINATE 50 MG: 50 TABLET, EXTENDED RELEASE ORAL at 20:30

## 2023-10-26 RX ADMIN — SULFAMETHOXAZOLE AND TRIMETHOPRIM 1 TABLET: 800; 160 TABLET ORAL at 10:50

## 2023-10-26 RX ADMIN — Medication 1 TABLET: at 10:57

## 2023-10-26 RX ADMIN — ENOXAPARIN SODIUM 60 MG: 80 INJECTION SUBCUTANEOUS at 20:30

## 2023-10-26 RX ADMIN — FUROSEMIDE 20 MG: 10 INJECTION, SOLUTION INTRAMUSCULAR; INTRAVENOUS at 10:50

## 2023-10-26 RX ADMIN — MAGNESIUM OXIDE TAB 400 MG (241.3 MG ELEMENTAL MG) 400 MG: 400 (241.3 MG) TAB at 10:57

## 2023-10-26 ASSESSMENT — COGNITIVE AND FUNCTIONAL STATUS - GENERAL
DAILY ACTIVITIY SCORE: 17
PERSONAL GROOMING: A LITTLE
MOBILITY SCORE: 17
TOILETING: A LOT
MOVING TO AND FROM BED TO CHAIR: A LITTLE
WALKING IN HOSPITAL ROOM: A LITTLE
TOILETING: A LITTLE
MOVING TO AND FROM BED TO CHAIR: A LITTLE
CLIMB 3 TO 5 STEPS WITH RAILING: A LOT
MOVING FROM LYING ON BACK TO SITTING ON SIDE OF FLAT BED WITH BEDRAILS: A LITTLE
WALKING IN HOSPITAL ROOM: A LITTLE
DAILY ACTIVITIY SCORE: 19
CLIMB 3 TO 5 STEPS WITH RAILING: A LITTLE
HELP NEEDED FOR BATHING: A LOT
HELP NEEDED FOR BATHING: A LITTLE
DRESSING REGULAR UPPER BODY CLOTHING: A LITTLE
DRESSING REGULAR LOWER BODY CLOTHING: A LITTLE
TURNING FROM BACK TO SIDE WHILE IN FLAT BAD: A LITTLE
STANDING UP FROM CHAIR USING ARMS: A LITTLE
TURNING FROM BACK TO SIDE WHILE IN FLAT BAD: A LITTLE
PERSONAL GROOMING: A LITTLE
MOVING FROM LYING ON BACK TO SITTING ON SIDE OF FLAT BED WITH BEDRAILS: A LITTLE
MOBILITY SCORE: 18
STANDING UP FROM CHAIR USING ARMS: A LITTLE
DRESSING REGULAR LOWER BODY CLOTHING: A LOT

## 2023-10-26 ASSESSMENT — PAIN SCALES - GENERAL
PAINLEVEL_OUTOF10: 0 - NO PAIN
PAINLEVEL_OUTOF10: 4
PAINLEVEL_OUTOF10: 5 - MODERATE PAIN
PAINLEVEL_OUTOF10: 7
PAINLEVEL_OUTOF10: 2

## 2023-10-26 ASSESSMENT — PAIN - FUNCTIONAL ASSESSMENT
PAIN_FUNCTIONAL_ASSESSMENT: 0-10

## 2023-10-26 ASSESSMENT — ACTIVITIES OF DAILY LIVING (ADL): HOME_MANAGEMENT_TIME_ENTRY: 13

## 2023-10-26 NOTE — PROGRESS NOTES
10/26/23 0920   Discharge Planning   Type of Post Acute Facility Services Skilled nursing   Patient expects to be discharged to: [facility]   Does the patient need discharge transport arranged? Yes   RoundTrip coordination needed? Yes     0920:  [facility] can accept.  Updated therapy needed for precert.  Therapy notified.      1500:  Therapy completed and sent to [facility] to start the precert.

## 2023-10-26 NOTE — CARE PLAN
The patient's goals for the shift include      The clinical goals for the shift include Patient will have improved pain control in LE    Over the shift, the patient did not make progress toward the following goals. Barriers to progression include waiting to report pain levels. Recommendations to address these barriers include assess pain frequently.

## 2023-10-26 NOTE — PROGRESS NOTES
Occupational Therapy    OT Treatment    Patient Name: Chris Chance  MRN: [id number]  Today's Date: 10/26/2023  Time Calculation  Start Time: 1300  Stop Time: 1313  Time Calculation (min): 13 min       Tx. Provided by ELIAS Cook ([id number])).  System has changed this clinicians  Credentials to student status in error.            Subjective   General:            Objective    Activities of Daily Living: LE Dressing  LE Dressing:  (pt. able to thread legs through pants with mod a and required sba for balance while standing to hike pants over hips.)  Functional Standing Tolerance:  Time:  (pt. tolerated 2.0 min of static stand balance with sba,)  Bed Mobility/Transfers: Transfers  Transfer:  (pt. able to move sit to stand with sba)           Outcome Measures:[facility] Daily Activity  Putting on and taking off regular lower body clothing: A lot  Bathing (including washing, rinsing, drying): A lot  Putting on and taking off regular upper body clothing: None  Toileting, which includes using toilet, bedpan or urinal: A lot  Taking care of personal grooming such as brushing teeth: A little  Eating Meals: None  Daily Activity - Total Score: 17        Education Documentation  Handouts, taught by FRANCK Cook at 10/26/2023  1:22 PM.  Learner: Patient  Readiness: Acceptance  Method: Explanation  Response: Verbalizes Understanding    ADL Training, taught by FRANCK Cook at 10/26/2023  1:22 PM.  Learner: Patient  Readiness: Acceptance  Method: Explanation  Response: Verbalizes Understanding    Education Comments  No comments found.        OP EDUCATION:       Goals:  Encounter Problems       Encounter Problems (Active)       Balance       STG - Maintains static standing balance with upper extremity support (Progressing)       Start:  10/20/23    Expected End:  11/10/23       And modified independence with LRAD            Mobility       STG - Patient will ambulate (Progressing)       Start:  10/20/23     Expected End:  11/10/23       >/= 100 feet with supervision, adaptive vital sign response and RPE </= 13/20         Goal 1 (Progressing)       Start:  10/20/23    Expected End:  11/10/23       Pt will perform >/= 3 steps with 1 HR and CGA              OT Goals       Patient with complete lower body bathing/dressing and toileting with minimal assist using assistive techiques/adaptive equipment as needed  (Progressing)       Start:  10/20/23    Expected End:  11/03/23            Patient will perform bed mobility and functional transfers independently: bed,chair, commode using DME as needed (Progressing)       Start:  10/20/23    Expected End:  11/03/23            apply energy conservation/diaphragmatic breathing techniques to ADL's and functional transfers with minimal cues  (Progressing)       Start:  10/20/23    Expected End:  11/03/23            Patient will tolerate standing for 8 mins. and show good (-) standing balance during ADL's and functional transfers/mobility (Progressing)       Start:  10/20/23    Expected End:  11/03/23               Transfers       STG - Transfer from bed to chair (Progressing)       Start:  10/20/23    Expected End:  11/10/23       With modified independence and LRAD         STG - Patient will perform bed mobility (Progressing)       Start:  10/20/23    Expected End:  11/10/23       With modified independence         STG - Patient will transfer sit to and from stand (Progressing)       Start:  10/20/23    Expected End:  11/10/23

## 2023-10-26 NOTE — PROGRESS NOTES
Chris Chance is a 42 y.o. male on day 6 of admission presenting with Generalized weakness.      Subjective   No events overnight. Patient seen and examined at bedside. No complaints. Discussed care with mother on phone. Care discussed with ID.       Objective     Last Recorded Vitals  /62   Pulse 105   Temp 36 °C (96.8 °F)   Resp 20   Wt (!) 183 kg (404 lb 5.2 oz)   SpO2 94%   Intake/Output last 3 Shifts:  No intake or output data in the 24 hours ending 10/26/23 0640      Admission Weight  Weight: (!) 181 kg (400 lb) (10/19/23 1120)    Daily Weight  10/25/23 : (!) 183 kg (404 lb 5.2 oz)    Image Results  ECG 12 lead  Sinus tachycardia  Abnormal R-wave progression, early transition  Abnormal T, consider ischemia, anterior leads    Confirmed by Ramicone, James ([id number]) on 10/7/2023 7:33:46 PM      Physical Exam  Constitutional:       General: He is not in acute distress.     Appearance: He is obese. He is not toxic-appearing.      Comments: Patient currently sitting up in a wheelchair.  Mother is at bedside.   HENT:      Head: Normocephalic and atraumatic.      Mouth/Throat:      Mouth: Mucous membranes are moist.      Pharynx: Oropharynx is clear.   Eyes:      Extraocular Movements: Extraocular movements intact.      Conjunctiva/sclera: Conjunctivae normal.      Pupils: Pupils are equal, round, and reactive to light.   Cardiovascular:      Rate and Rhythm: Normal rate and regular rhythm.   Pulmonary:      Comments: Diminished breath sounds due to body habitus.  Some slight wheezing noted.  Abdominal:      General: Bowel sounds are normal. There is distension.      Tenderness: There is abdominal tenderness (Generalized tenderness to abdominal area.). There is no guarding.   Musculoskeletal:         General: Swelling present.      Right lower leg: Edema present.      Left lower leg: Edema present.   Skin:     General: Skin is warm and dry.      Comments: Discoloration, edema, and swelling to bilateral  lower extremities.  Wound on bottom left heel.  Dressing still on legs, going to wait for patient to be placed in a room and examined by podiatry to undress legs fully due to patient being in pain.   Neurological:      General: No focal deficit present.      Mental Status: He is alert and oriented to person, place, and time.   Psychiatric:         Mood and Affect: Mood normal.         Behavior: Behavior normal.     Relevant Results               Assessment/Plan        Active Problems:    Multiple open wounds of lower extremity, complicated, sequela    Unable to ambulate    Chris Chance is a 42 y.o. male with past medical history significant for morbid obesity, hypertension, hyperlipidemia, gout, asthma, and MICHAEL who presents with bilateral lower extremity pain and wounds.  Presents today due to inability to care for himself at home, states that house was not prepared for him after discharge from nursing home and patient want to be placed somewhere after discharge from hospital.  Recent Tissue/Wound culture on 10/13/2023 grew multiple MDRO's including abundant Enterobacter clocae complex & Moderate Acinetobacter calcoaceticus-baumannii complex.  Was taken vancomycin and then switched to oral Bactrim which she is still taking.  Patient states he had a recent surgical debridement done on September 29. Has been in nursing home and working with PT/OT for the past couple weeks.  States he tested positive for COVID about a week ago as well.  Was released from the nursing home yesterday and when he got back home he had difficulty getting up the stairs into the house and also had difficulty getting around inside the house.  States there was no new bigger bed and also no commode, says the house is not prepared for him.  Patient was ambulating around with a walker at home.       #History of Bilateral leg wounds-edematous  #Leukocytosis, WBC 14.1 today  Podiatry consult, no surgical indication  ID consulted, appreciate recs  -- change antibiotics to Merrem and Bactrim  Previous cultures reviewed  Social work consult for placement   Admit for observation  Q8 vitals  Cardiac diet  CBC, BMP in a.m.  ESR, CRP added on  Urinalysis  Tylenol as needed for mild pain  Oxycodone as needed for moderate pain     #Hypomagnesemia, level 1.33 today  Magnesium replacement  Repeat magnesium in the a.m.     #Shortness of breath, history of asthma  Albuterol as needed for shortness of breath or wheezing  Breo Ellipta     #Constipation  Colace, MiraLAX  MoM     #weakness in lower extremities  PT/OT for evaluation and treatment      Continue patient's at home medications as appropriate     Chronic conditions:  #Obesity  #Hypertension  #Hyperlipidemia  #Gout  #MICHAEL  #Asthma     #DVT prophylaxis  Lovenox  Ambulation as tolerated with walker       Dispo: will need SNF, awaiting precert to Kannan Avila,

## 2023-10-26 NOTE — CARE PLAN
The patient's goals for the shift include      The clinical goals for the shift include Patient will have improved pain control in LE

## 2023-10-26 NOTE — PROGRESS NOTES
Physical Therapy    Physical Therapy Treatment    Patient Name: Chris Chance  MRN: [id number]  Today's Date: 10/26/2023  Time Calculation  Start Time: 1335  Stop Time: 1350  Time Calculation (min): 15 min       Assessment/Plan   PT Assessment  PT Assessment Results: Decreased strength, Decreased range of motion, Decreased endurance, Impaired balance, Decreased mobility  Rehab Prognosis: Good  Evaluation/Treatment Tolerance: Patient limited by fatigue, Patient limited by pain  Assessment Comment:  (42 year old male admitted with LE wounds, LE pain and fall from couch, who presents to PT with the above mentioned impairments, impaired transfers, gait and stair negotition who would benefit from contined in house PT.)  End of Session Patient Position: Up in chair, Alarm off, not on at start of session     PT Plan  Treatment/Interventions: Bed mobility, Transfer training, Gait training, Stair training, Balance training, Strengthening, Therapeutic exercise, Therapeutic activity, Home exercise program  PT Plan: Skilled PT  PT Frequency: 3 times per week  PT Discharge Recommendations: Moderate intensity level of continued care  PT Recommended Transfer Status: Assist x1 (and bariatic walker, short in room distances)  PT - OK to Discharge: Yes      General Visit Information:   PT  Visit  PT Received On: 10/26/23  General  Prior to Session Communication: Bedside nurse  Patient Position Received: Up in chair, Alarm off, not on at start of session  General Comment:  (pt pleasant, agreeable to participate in tx session.  looking forward to discharge.)    Subjective   Precautions:  Precautions  Medical Precautions: Fall precautions, Cardiac precautions, Infection precautions  Precautions Comment: B LE bandaged       Objective   Pain:  Pain Assessment  Pain Assessment: 0-10  Pain Score: 4  Pain Location:  (B LEs (L>R))     Treatments:  Therapeutic Exercise  Therapeutic Exercise Performed:  (reviewed seated ther ex)    Bed  Mobility  Bed Mobility:  (NT - pt seated in recliner upon therapy arrival and departure)    Ambulation/Gait Training  Ambulation/Gait Training Performed:  (pt ambulates 15 ft with FWW followed by additional 15 ft with no AD, seated rest break between bouts.  SBA to supervision.  limited by decreased endurance and pain.)    Transfers  Transfer:  (sit <-> stand x 2 trials with SBA to supervision)    Outcome Measures:  [facility] Basic Mobility  Turning from your back to your side while in a flat bed without using bedrails: A little  Moving from lying on your back to sitting on the side of a flat bed without using bedrails: A little  Moving to and from bed to chair (including a wheelchair): A little  Standing up from a chair using your arms (e.g. wheelchair or bedside chair): A little  To walk in hospital room: A little  Climbing 3-5 steps with railing: A lot  Basic Mobility - Total Score: 17    Education Documentation  Home Exercise Program, taught by Anai Jiménez PTA at 10/26/2023  2:34 PM.  Learner: Patient  Readiness: Acceptance  Method: Explanation  Response: Verbalizes Understanding, Demonstrated Understanding    Mobility Training, taught by Anai iJménez PTA at 10/26/2023  2:34 PM.  Learner: Patient  Readiness: Acceptance  Method: Explanation  Response: Verbalizes Understanding, Demonstrated Understanding    Education Comments  No comments found.        EDUCATION:       Encounter Problems       Encounter Problems (Active)             Mobility       STG - Patient will ambulate (Progressing)       Start:  10/20/23    Expected End:  11/10/23       >/= 100 feet with supervision, adaptive vital sign response and RPE </= 13/20         Goal 1 (Progressing)       Start:  10/20/23    Expected End:  11/10/23       Pt will perform >/= 3 steps with 1 HR and CGA              Transfers       STG - Transfer from bed to chair (Progressing)       Start:  10/20/23    Expected End:  11/10/23       With modified independence and  LRAD         STG - Patient will perform bed mobility (Progressing)       Start:  10/20/23    Expected End:  11/10/23       With modified independence         STG - Patient will transfer sit to and from stand (Progressing)       Start:  10/20/23    Expected End:  11/10/23

## 2023-10-27 PROCEDURE — 96372 THER/PROPH/DIAG INJ SC/IM: CPT | Performed by: PHYSICIAN ASSISTANT

## 2023-10-27 PROCEDURE — 2500000001 HC RX 250 WO HCPCS SELF ADMINISTERED DRUGS (ALT 637 FOR MEDICARE OP): Performed by: NURSE PRACTITIONER

## 2023-10-27 PROCEDURE — 1100000001 HC PRIVATE ROOM DAILY

## 2023-10-27 PROCEDURE — 99232 SBSQ HOSP IP/OBS MODERATE 35: CPT | Performed by: INTERNAL MEDICINE

## 2023-10-27 PROCEDURE — 97530 THERAPEUTIC ACTIVITIES: CPT | Mod: GO

## 2023-10-27 PROCEDURE — 97535 SELF CARE MNGMENT TRAINING: CPT | Mod: GO

## 2023-10-27 PROCEDURE — 2500000004 HC RX 250 GENERAL PHARMACY W/ HCPCS (ALT 636 FOR OP/ED): Performed by: INTERNAL MEDICINE

## 2023-10-27 PROCEDURE — 94640 AIRWAY INHALATION TREATMENT: CPT

## 2023-10-27 PROCEDURE — 94760 N-INVAS EAR/PLS OXIMETRY 1: CPT

## 2023-10-27 PROCEDURE — 2500000004 HC RX 250 GENERAL PHARMACY W/ HCPCS (ALT 636 FOR OP/ED): Performed by: NURSE PRACTITIONER

## 2023-10-27 PROCEDURE — 2500000004 HC RX 250 GENERAL PHARMACY W/ HCPCS (ALT 636 FOR OP/ED): Performed by: PHYSICIAN ASSISTANT

## 2023-10-27 PROCEDURE — 2500000001 HC RX 250 WO HCPCS SELF ADMINISTERED DRUGS (ALT 637 FOR MEDICARE OP): Performed by: PHYSICIAN ASSISTANT

## 2023-10-27 RX ADMIN — ACETAMINOPHEN 650 MG: 325 TABLET ORAL at 19:53

## 2023-10-27 RX ADMIN — SULFAMETHOXAZOLE AND TRIMETHOPRIM 1 TABLET: 800; 160 TABLET ORAL at 21:23

## 2023-10-27 RX ADMIN — ENOXAPARIN SODIUM 60 MG: 80 INJECTION SUBCUTANEOUS at 10:11

## 2023-10-27 RX ADMIN — DOCUSATE SODIUM 100 MG: 100 CAPSULE, LIQUID FILLED ORAL at 10:12

## 2023-10-27 RX ADMIN — ENOXAPARIN SODIUM 60 MG: 80 INJECTION SUBCUTANEOUS at 21:23

## 2023-10-27 RX ADMIN — FLUTICASONE FUROATE AND VILANTEROL TRIFENATATE 1 PUFF: 200; 25 POWDER RESPIRATORY (INHALATION) at 06:57

## 2023-10-27 RX ADMIN — ACETAMINOPHEN 650 MG: 325 TABLET ORAL at 06:19

## 2023-10-27 RX ADMIN — DOCUSATE SODIUM 100 MG: 100 CAPSULE, LIQUID FILLED ORAL at 21:23

## 2023-10-27 RX ADMIN — FUROSEMIDE 20 MG: 10 INJECTION, SOLUTION INTRAMUSCULAR; INTRAVENOUS at 10:12

## 2023-10-27 RX ADMIN — METOPROLOL SUCCINATE 50 MG: 50 TABLET, EXTENDED RELEASE ORAL at 21:24

## 2023-10-27 RX ADMIN — Medication 1 TABLET: at 10:12

## 2023-10-27 RX ADMIN — ACETAMINOPHEN 650 MG: 325 TABLET ORAL at 00:37

## 2023-10-27 RX ADMIN — MAGNESIUM OXIDE TAB 400 MG (241.3 MG ELEMENTAL MG) 400 MG: 400 (241.3 MG) TAB at 10:12

## 2023-10-27 RX ADMIN — SULFAMETHOXAZOLE AND TRIMETHOPRIM 1 TABLET: 800; 160 TABLET ORAL at 10:12

## 2023-10-27 RX ADMIN — POLYETHYLENE GLYCOL 3350 17 G: 17 POWDER, FOR SOLUTION ORAL at 09:00

## 2023-10-27 ASSESSMENT — ACTIVITIES OF DAILY LIVING (ADL): HOME_MANAGEMENT_TIME_ENTRY: 15

## 2023-10-27 ASSESSMENT — COGNITIVE AND FUNCTIONAL STATUS - GENERAL
DRESSING REGULAR LOWER BODY CLOTHING: A LITTLE
TOILETING: A LITTLE
MOBILITY SCORE: 24
DRESSING REGULAR UPPER BODY CLOTHING: A LITTLE
PERSONAL GROOMING: A LITTLE
DRESSING REGULAR UPPER BODY CLOTHING: A LITTLE
DAILY ACTIVITIY SCORE: 19
TOILETING: A LITTLE
MOBILITY SCORE: 24
DAILY ACTIVITIY SCORE: 19
PERSONAL GROOMING: A LITTLE
DAILY ACTIVITIY SCORE: 19
DRESSING REGULAR UPPER BODY CLOTHING: A LITTLE
HELP NEEDED FOR BATHING: A LITTLE
HELP NEEDED FOR BATHING: A LOT
HELP NEEDED FOR BATHING: A LITTLE
DRESSING REGULAR LOWER BODY CLOTHING: A LITTLE
DRESSING REGULAR LOWER BODY CLOTHING: A LITTLE
TOILETING: A LITTLE

## 2023-10-27 ASSESSMENT — PAIN - FUNCTIONAL ASSESSMENT
PAIN_FUNCTIONAL_ASSESSMENT: 0-10

## 2023-10-27 ASSESSMENT — PAIN SCALES - GENERAL
PAINLEVEL_OUTOF10: 4
PAINLEVEL_OUTOF10: 7
PAINLEVEL_OUTOF10: 7
PAINLEVEL_OUTOF10: 5 - MODERATE PAIN
PAINLEVEL_OUTOF10: 3
PAINLEVEL_OUTOF10: 4
PAINLEVEL_OUTOF10: 2
PAINLEVEL_OUTOF10: 2

## 2023-10-27 NOTE — PROGRESS NOTES
Occupational Therapy    OT Treatment    Patient Name: Chris Chance  MRN: [id number]  Today's Date: 10/27/2023  Time Calculation  Start Time: 0928  Stop Time: 1001  Time Calculation (min): 33 min           Subjective   General:     General Comment:  (pt pleasant and eager to work.  pt looking forward to going for additonal rehab.)  Vital Signs:     Pain:       Objective    Activities of Daily Living:      Grooming  Grooming Comments:  (pt able to complete grooming while standing at the sink x 2 minutes with fair+ lt dyn standing balance, with intermittent reliance on sink for support due to fatigue.)         LE Dressing  LE Dressing:  (pt educated with use of lower body dressing stick to assist in independence with adls. pt able do don pants and thread foot and calf through pant leg utilizing dressing stick and cga with frequent rest breaks due to fatigue.  pt educated on EC.)       Functional Standing Tolerance:  Functional Standing Tolerance Comments:  (tolerated 2minutes for functional task.)  Bed Mobility/Transfers:      Transfers  Transfer:  (sit to stand with sba x 3 trails.)          Therapy/Activity:      Therapeutic Activity  Therapeutic Activity Performed:  (pt abld to demonstrate functional moiblty in room to remove eating tray and to perform grooming at the sink 8 feet x 2 trials with rest in between tasks with sba and sob noted.)      Outcome Measures:[facility] Daily Activity  Putting on and taking off regular lower body clothing: A little  Bathing (including washing, rinsing, drying): A lot  Putting on and taking off regular upper body clothing: A little  Toileting, which includes using toilet, bedpan or urinal: A little  Taking care of personal grooming such as brushing teeth: None  Eating Meals: None  Daily Activity - Total Score: 19        Education Documentation  No documentation found.  Education Comments  No comments found.        Goals:  Encounter Problems       Encounter Problems (Active)        Balance       STG - Maintains static standing balance with upper extremity support (Progressing)       Start:  10/20/23    Expected End:  11/10/23       And modified independence with LRAD            Mobility       STG - Patient will ambulate (Progressing)       Start:  10/20/23    Expected End:  11/10/23       >/= 100 feet with supervision, adaptive vital sign response and RPE </= 13/20         Goal 1 (Progressing)       Start:  10/20/23    Expected End:  11/10/23       Pt will perform >/= 3 steps with 1 HR and CGA              OT Goals       Patient with complete lower body bathing/dressing and toileting with minimal assist using assistive techiques/adaptive equipment as needed  (Progressing)       Start:  10/20/23    Expected End:  11/03/23            Patient will perform bed mobility and functional transfers independently: bed,chair, commode using DME as needed (Progressing)       Start:  10/20/23    Expected End:  11/03/23            apply energy conservation/diaphragmatic breathing techniques to ADL's and functional transfers with minimal cues  (Progressing)       Start:  10/20/23    Expected End:  11/03/23            Patient will tolerate standing for 8 mins. and show good (-) standing balance during ADL's and functional transfers/mobility (Progressing)       Start:  10/20/23    Expected End:  11/03/23               Transfers       STG - Transfer from bed to chair (Progressing)       Start:  10/20/23    Expected End:  11/10/23       With modified independence and LRAD         STG - Patient will perform bed mobility (Progressing)       Start:  10/20/23    Expected End:  11/10/23       With modified independence         STG - Patient will transfer sit to and from stand (Progressing)       Start:  10/20/23    Expected End:  11/10/23

## 2023-10-27 NOTE — PROGRESS NOTES
"Chris Chance is a 42 y.o. male on day 7 of admission presenting with Generalized weakness.    Subjective   Pt laying comfortably in bed without any signs of distress. Denies pain and constitution.       Objective     Physical Exam  Vascular: Faintly palpable DP/PT pulses B/L. Moderate pitting edema noted B/L. Hair growth absent B/L. CFT<5 to B/L hallux. Temperature is warm to cool from tibial tuberosity to distal digits B/L. No lymphatic streaking noted B/L.     Musculoskeletal: Gross active and passive ROM intact to age and activity level. Moves all extremities spontaneously. No pain to palpation at feet B/L.      Neurological: unchanged, BLE     Dermatologic:   Wound:   Measurements: Right Lower Extremity: 7.5 cm x 9.2cm x 0.1cm  Mixed wound base of Mixed fibro granular tissue.   Mild serosanguinous drainage with fibrotic slough.   None mellisa-wound maceration.   Moderate mellisa-wound erythema.   Minimal evidence of ascending cellulitis or lymphangitis.  None palpable fluctuance. Minimal malodor. Mild increased warmth.   None probe to bone or deep structures within the wound base.   None tunneling or tracking.   None undermining. Skin edges irregular but intact.     Measurements: Left Lower Extremity  23 cm x 14.2 cm x 0.1 cm  Mixed wound base of Fibro granular tissue.   Significant serosanguinous drainage when compared to the right leg  with fibrotic slough.   Mild mellisa-wound maceration.   Moderate mellisa-wound erythema.   Minimal evidence of ascending cellulitis or lymphangitis.  None palpable fluctuance. Mild malodor. None increased warmth.   None probe to bone or deep structures within the wound base.   None tunneling or tracking.   None undermining. Skin edges irregular but intact.      Last Recorded Vitals  Blood pressure 140/67, pulse (!) 120, temperature 36 °C (96.8 °F), temperature source Temporal, resp. rate 18, height 1.702 m (5' 7.01\"), weight (!) 183 kg (403 lb 7.1 oz), SpO2 98 %.  Intake/Output last 3 " Shifts:  I/O last 3 completed shifts:  In: 650 (3.6 mL/kg) [P.O.:650]  Out: 4 (0 mL/kg) [Stool:4]  Weight: 182.9 kg     Relevant Results      Scheduled medications  docusate sodium, 100 mg, oral, BID  enoxaparin, 60 mg, subcutaneous, q12h BILLY  fluticasone furoate-vilanteroL, 1 puff, inhalation, Daily  furosemide, 20 mg, intravenous, Daily  lactobacillus acidophilus, 1 tablet, oral, Daily  magnesium oxide, 400 mg, oral, Daily  metoprolol succinate XL, 50 mg, oral, q PM  nystatin, , Topical, BID  polyethylene glycol, 17 g, oral, Daily  sulfamethoxazole-trimethoprim, 1 tablet, oral, BID      Continuous medications     PRN medications  PRN medications: acetaminophen, albuterol, docusate sodium, oxyCODONE, simethicone  No results found for this or any previous visit (from the past 24 hour(s)).              Assessment/Plan         Assessment:  #Circumferential B/L leg wounds   #HTN  #Asthma  #HLD  #MICHAEL     Plan:  - Patient was seen and evaluated; all findings were discussed and all questions were answered to patient's satisfaction.  - Patient wounds have improved and is stable   - Patient to be DC to SNF  - Charts, labs, vitals and imaging all reviewed.   - Pain Regimen: Per Primary  - Wound care orders placed  - Dressings: Adaptic, 4x4,s ABD, Krelix, ACE.  - Nursing staff is able to change dressing   - Pt is stable from our stand point and out care wont hold DC  - Podiatry will follow from afar     Not is not final will discuss with Dr. Serina blanco for final signature.            Alvin Fitzgerald DPM, PGY-3

## 2023-10-27 NOTE — CONSULTS
Nutrition Note  Reason for Assessment  Reason for Assessment: Length of stay (Pt is hospital day #8; MST=0)   Patient admitted for inability to walk, generalized weakness     Recommendation(s):  Diet as ordered   Jesus twice daily ( For an additional 90 kcals, 2.5 gm protein, supplemental glutamin and arginine in each packet)  to support wound healing   Consider daily MVI   Reweigh daily       Subjective      Energy Intake:  (no meal intakes recorded on flow sheet; Secure chat sent to RN Everardo regarding this.)  Per RN, pt is eating 100% of meals  Pt was sleeping soundly in chair in room.   Pt was noted to have surgical debridement of wound on 9/29, discharged to NH for 2 weeks, returned home 10/18, but was unable to ambulate, go up stairs, so returned to hospital. During hospital stay in Sept pt was eating 100% of meals and taking Jesus twice daily. Awaiting precert for discharge.    Difficulty chewing: none noted  Difficulty swallowing: none noted     Objective   Per Flowsheet Percent Meal intake:  per RN pt eating 100% of meals  Dietary Orders (From admission, onward)       Start     Ordered    10/27/23 1121  Oral nutritional supplements  Until discontinued        Question Answer Comment   Deliver with Dinner    Deliver with Lunch    Select supplement: Jesus        10/27/23 1120    10/19/23 1412  Adult diet Cardiac; 70 gm fat; 2 - 3 grams Sodium  Diet effective now        Question Answer Comment   Diet type Cardiac    Fat restriction: 70 gm fat    Sodium restriction: 2 - 3 grams Sodium        10/19/23 1411                  Independent  Results from last 7 days   Lab Units 10/26/23  0708 10/25/23  0745 10/24/23  0701 10/23/23  0624 10/22/23  0631 10/21/23  0710   GLUCOSE mg/dL 94 98 85   < > 86 102*   SODIUM mmol/L 132* 133* 132*   < > 134* 138   POTASSIUM mmol/L 4.0 3.8 3.4*   < > 3.7 3.4*   CHLORIDE mmol/L 97* 98 97*   < > 97* 100   CO2 mmol/L 26 26 28   < > 28 28   BUN mg/dL 7 7 8   < > 12 14   CREATININE mg/dL  "0.75 0.78 0.89   < > 1.00 1.02   EGFR mL/min/1.73m*2 >90 >90 >90   < > >90 >90   CALCIUM mg/dL 7.1* 7.0* 7.0*   < > 7.8* 8.6   MAGNESIUM mg/dL  --   --  1.74  --  1.79 1.61    < > = values in this interval not displayed.     Lab Results   Component Value Date    HGBA1C 5.3 09/29/2023    HGBA1C CANCELED 09/29/2023         GI per flowsheet:  Gastrointestinal  Gastrointestinal (WDL): Within Defined Limits  Abdomen Inspection: Soft, Rounded  Bowel Sounds: All quadrants  Bowel Sounds (All Quadrants): Hypoactive  Passing Flatus: Yes  Last BM Date: 10/27/23  Bowel Incontinence: No  Last bowel movement documented: 10/27/23  PMH: multiple lower extremity wounds, asthma, HTN, HLD, gout, morbid obesity, MICHAEL  Allergies: Patient has no known allergies.     Anthropometrics:  Height: 170.2 cm (5' 7.01\")  Weight: (!) 183 kg (403 lb 7.1 oz)  BMI (Calculated): 63.17      IBW: 67.2 kg  %IBW: 272 %       Weight History / % Weight Change: 10/24 184kg, 10/23 184kg, 10/22 185kg, 10/21 181kg, 9/29 173kg, 7/18 174kg, 5/2022 147kg  Significant Weight Gain: Fluid related    Estimated Nutritional Needs:  Method for Estimating Needs: 2000-2300kcals (30-35kcals/kg IBW)    Method for Estimating Needs: 80-100g (1.2-1.5g/kg IBW)    Method for Estimating Needs: 1ml/kcal or per MD    Nutrition Focused Physical Findings:   Orbital Fat Pads: Well nourshed (slightly bulging fat pads)  Buccal Fat Pads: Well nourished (full, rounded cheeks)  Triceps: Well nourished (ample fat tissue)  Ribs: Defer    Temporalis: Well nourished (well-defined muscle)  Pectoralis (Clavicular Region): Well nourished (clavicle not visible)  Deltoid/Trapezius: Well nourished (rounded appearance at arm, shoulder, neck)  Interosseous: Well nourished (muscle bulges)  Trapezius/Infraspinatus/Supraspinatus (Scapular Region): Well nourished (bones not prominent, muscle taut)  Quadriceps: Defer  Gastrocnemius: Defer    Edema  Edema: +2 mild  Edema Location: generalized and BLE    Skin: " Positive (BLE wounds)  Pain Score: 2     Nutrition Diagnosis   Patient has Malnutrition Diagnosis: No    Patient has Nutrition Diagnosis: Yes  New  Nutrition Diagnosis 1: Increased nutrient needs  Related to (1): physiological causes increasing nutrient needs  As Evidenced by (1): wound healing needs       Intervention:     Interventions: Meals and snacks  Meals and Snacks: Fat-modified diet, Mineral-modified diet  Goal: consume >75% of meals  Additional Interventions: will order Jesus twice daily to support wound healing  Individualized Nutrition Prescription Provided for : Cardiac diet    Nutrition Monitoring and Evaluation   Weights  Labs  PO intake  Skin integrity and healing     Goals: oral intake >75% of meals, consume oral nutrition supplement, adequate PO fluid intake, labs WNL, promote healing, decreased weight from edema/fluid, and promote regular bowel movements  Progress towards goals:  Will assess upon follow up     Education Documentation  No documentation found.      Time Spent (min): 45 minutes  Last Date of Nutrition Visit: 10/27/23  Nutrition Follow-Up Needed?: 5-7 days  Follow up Comment: 11/1-11/3 TG

## 2023-10-27 NOTE — PROGRESS NOTES
Chris Chance is a 42 y.o. male on day 6 of admission presenting with Generalized weakness.      Subjective   No events overnight. Patient seen and examined at bedside. No complaints. Discussed care with mother on phone. Awaiting precert.       Objective     Last Recorded Vitals  /67   Pulse (!) 120   Temp 36 °C (96.8 °F)   Resp 20   Wt (!) 183 kg (404 lb 5.2 oz)   SpO2 99%   Intake/Output last 3 Shifts:    Intake/Output Summary (Last 24 hours) at 10/26/2023 2328  Last data filed at 10/26/2023 1214  Gross per 24 hour   Intake --   Output 1 ml   Net -1 ml         Admission Weight  Weight: (!) 181 kg (400 lb) (10/19/23 1120)    Daily Weight  10/25/23 : (!) 183 kg (404 lb 5.2 oz)    Image Results  ECG 12 lead  Sinus tachycardia  Abnormal R-wave progression, early transition  Abnormal T, consider ischemia, anterior leads    Confirmed by Ramicone, James ([id number]) on 10/7/2023 7:33:46 PM      Physical Exam  Constitutional:       General: He is not in acute distress.     Appearance: He is obese. He is not toxic-appearing.      Comments: Patient currently sitting up in a wheelchair.  Mother is at bedside.   HENT:      Head: Normocephalic and atraumatic.      Mouth/Throat:      Mouth: Mucous membranes are moist.      Pharynx: Oropharynx is clear.   Eyes:      Extraocular Movements: Extraocular movements intact.      Conjunctiva/sclera: Conjunctivae normal.      Pupils: Pupils are equal, round, and reactive to light.   Cardiovascular:      Rate and Rhythm: Normal rate and regular rhythm.   Pulmonary:      Comments: Diminished breath sounds due to body habitus.  Some slight wheezing noted.  Abdominal:      General: Bowel sounds are normal. There is distension.      Tenderness: There is abdominal tenderness (Generalized tenderness to abdominal area.). There is no guarding.   Musculoskeletal:         General: Swelling present.      Right lower leg: Edema present.      Left lower leg: Edema present.   Skin:      General: Skin is warm and dry.      Comments: Discoloration, edema, and swelling to bilateral lower extremities.  Wound on bottom left heel.  Dressing still on legs, going to wait for patient to be placed in a room and examined by podiatry to undress legs fully due to patient being in pain.   Neurological:      General: No focal deficit present.      Mental Status: He is alert and oriented to person, place, and time.   Psychiatric:         Mood and Affect: Mood normal.         Behavior: Behavior normal.     Relevant Results               Assessment/Plan        Active Problems:    Multiple open wounds of lower extremity, complicated, sequela    Unable to ambulate    Chris Chance is a 42 y.o. male with past medical history significant for morbid obesity, hypertension, hyperlipidemia, gout, asthma, and MICHAEL who presents with bilateral lower extremity pain and wounds.  Presents today due to inability to care for himself at home, states that house was not prepared for him after discharge from nursing home and patient want to be placed somewhere after discharge from hospital.  Recent Tissue/Wound culture on 10/13/2023 grew multiple MDRO's including abundant Enterobacter clocae complex & Moderate Acinetobacter calcoaceticus-baumannii complex.  Was taken vancomycin and then switched to oral Bactrim which she is still taking.  Patient states he had a recent surgical debridement done on September 29. Has been in nursing home and working with PT/OT for the past couple weeks.  States he tested positive for COVID about a week ago as well.  Was released from the nursing home yesterday and when he got back home he had difficulty getting up the stairs into the house and also had difficulty getting around inside the house.  States there was no new bigger bed and also no commode, says the house is not prepared for him.  Patient was ambulating around with a walker at home.       #History of Bilateral leg  wounds-edematous  #Leukocytosis, WBC 14.1 today  Podiatry consult, no surgical indication  ID consulted, appreciate recs -- change antibiotics to Merrem and Bactrim  Previous cultures reviewed  Social work consult for placement   Admit for observation  Q8 vitals  Cardiac diet  CBC, BMP in a.m.  ESR, CRP added on  Urinalysis  Tylenol as needed for mild pain  Oxycodone as needed for moderate pain     #Hypomagnesemia, level 1.33 today  Magnesium replacement  Repeat magnesium in the a.m.     #Shortness of breath, history of asthma  Albuterol as needed for shortness of breath or wheezing  Breo Ellipta     #Constipation  Colace, MiraLAX  MoM     #weakness in lower extremities  PT/OT for evaluation and treatment      Continue patient's at home medications as appropriate     Chronic conditions:  #Obesity  #Hypertension  #Hyperlipidemia  #Gout  #MICHAEL  #Asthma     #DVT prophylaxis  Lovenox  Ambulation as tolerated with walker       Dispo: will need SNF, awaiting precert to [facility], medically ready       Erik Avila, DO

## 2023-10-27 NOTE — CARE PLAN
The patient's goals for the shift include      The clinical goals for the shift include Patient will have improved pain control in LE    Over the shift, the patient did not make progress toward the following goals. Barriers to progression include reporting pain early. Recommendations to address these barriers include assess pain frequently.

## 2023-10-27 NOTE — PROGRESS NOTES
10/27/23 0914   Discharge Planning   Type of Post Acute Facility Services Skilled nursing   Patient expects to be discharged to: SNF   Does the patient need discharge transport arranged? Yes   RoundTrip coordination needed? Yes     0914:  [facility] are saying we are not in network with his plan, even though when we called they said we were.   TCC updated.     1030:  Referrals sent to [facility], [facility], [facility], [facility], [facility], and [facility] for review.     1113:  Received responses from [facility] not able to accept, [facility], [facility], [facility] not able to accept, [facility] reviewing, and [facility] can accept. Reviewed responses with patient who asked that I call his mother Allyssa.  Reviewed above facility responses with Allyssa who is agreeable to [facility] as facility preference.  [facility] notified to start precert.

## 2023-10-28 PROCEDURE — 2500000004 HC RX 250 GENERAL PHARMACY W/ HCPCS (ALT 636 FOR OP/ED): Performed by: NURSE PRACTITIONER

## 2023-10-28 PROCEDURE — 1100000001 HC PRIVATE ROOM DAILY

## 2023-10-28 PROCEDURE — 2500000004 HC RX 250 GENERAL PHARMACY W/ HCPCS (ALT 636 FOR OP/ED): Performed by: PHYSICIAN ASSISTANT

## 2023-10-28 PROCEDURE — 94760 N-INVAS EAR/PLS OXIMETRY 1: CPT

## 2023-10-28 PROCEDURE — 96372 THER/PROPH/DIAG INJ SC/IM: CPT | Performed by: PHYSICIAN ASSISTANT

## 2023-10-28 PROCEDURE — 2500000004 HC RX 250 GENERAL PHARMACY W/ HCPCS (ALT 636 FOR OP/ED): Performed by: INTERNAL MEDICINE

## 2023-10-28 PROCEDURE — 2500000001 HC RX 250 WO HCPCS SELF ADMINISTERED DRUGS (ALT 637 FOR MEDICARE OP): Performed by: PHYSICIAN ASSISTANT

## 2023-10-28 PROCEDURE — 2500000001 HC RX 250 WO HCPCS SELF ADMINISTERED DRUGS (ALT 637 FOR MEDICARE OP): Performed by: NURSE PRACTITIONER

## 2023-10-28 PROCEDURE — 99232 SBSQ HOSP IP/OBS MODERATE 35: CPT | Performed by: INTERNAL MEDICINE

## 2023-10-28 PROCEDURE — 94640 AIRWAY INHALATION TREATMENT: CPT

## 2023-10-28 RX ADMIN — MAGNESIUM OXIDE TAB 400 MG (241.3 MG ELEMENTAL MG) 400 MG: 400 (241.3 MG) TAB at 09:53

## 2023-10-28 RX ADMIN — POLYETHYLENE GLYCOL 3350 17 G: 17 POWDER, FOR SOLUTION ORAL at 09:53

## 2023-10-28 RX ADMIN — ENOXAPARIN SODIUM 60 MG: 80 INJECTION SUBCUTANEOUS at 20:26

## 2023-10-28 RX ADMIN — Medication 1 TABLET: at 09:53

## 2023-10-28 RX ADMIN — DOCUSATE SODIUM 100 MG: 100 CAPSULE, LIQUID FILLED ORAL at 09:53

## 2023-10-28 RX ADMIN — FUROSEMIDE 20 MG: 10 INJECTION, SOLUTION INTRAMUSCULAR; INTRAVENOUS at 09:53

## 2023-10-28 RX ADMIN — DOCUSATE SODIUM 100 MG: 100 CAPSULE, LIQUID FILLED ORAL at 20:26

## 2023-10-28 RX ADMIN — METOPROLOL SUCCINATE 50 MG: 50 TABLET, EXTENDED RELEASE ORAL at 20:27

## 2023-10-28 RX ADMIN — ENOXAPARIN SODIUM 60 MG: 80 INJECTION SUBCUTANEOUS at 09:53

## 2023-10-28 RX ADMIN — ACETAMINOPHEN 650 MG: 325 TABLET ORAL at 01:49

## 2023-10-28 RX ADMIN — FLUTICASONE FUROATE AND VILANTEROL TRIFENATATE 1 PUFF: 200; 25 POWDER RESPIRATORY (INHALATION) at 07:47

## 2023-10-28 RX ADMIN — SULFAMETHOXAZOLE AND TRIMETHOPRIM 1 TABLET: 800; 160 TABLET ORAL at 20:26

## 2023-10-28 RX ADMIN — SULFAMETHOXAZOLE AND TRIMETHOPRIM 1 TABLET: 800; 160 TABLET ORAL at 09:53

## 2023-10-28 RX ADMIN — ACETAMINOPHEN 650 MG: 325 TABLET ORAL at 17:37

## 2023-10-28 ASSESSMENT — PAIN - FUNCTIONAL ASSESSMENT
PAIN_FUNCTIONAL_ASSESSMENT: 0-10

## 2023-10-28 ASSESSMENT — PAIN SCALES - GENERAL
PAINLEVEL_OUTOF10: 4
PAINLEVEL_OUTOF10: 4
PAINLEVEL_OUTOF10: 0 - NO PAIN
PAINLEVEL_OUTOF10: 7
PAINLEVEL_OUTOF10: 3
PAINLEVEL_OUTOF10: 4

## 2023-10-28 NOTE — PROGRESS NOTES
Chris Chance is a 42 y.o. male on day 8 of admission presenting with Generalized weakness.      Subjective   No events overnight. Patient seen and examined at bedside. No complaints. Awaiting acceptance to new SNF.       Objective     Last Recorded Vitals  /73 (BP Location: Right arm, Patient Position: Sitting)   Pulse (!) 130   Temp 36.5 °C (97.7 °F) (Temporal)   Resp 19   Wt (!) 182 kg (401 lb 7.3 oz)   SpO2 94%   Intake/Output last 3 Shifts:    Intake/Output Summary (Last 24 hours) at 10/28/2023 1622  Last data filed at 10/28/2023 0400  Gross per 24 hour   Intake 550 ml   Output 0 ml   Net 550 ml         Admission Weight  Weight: (!) 181 kg (400 lb) (10/19/23 1120)    Daily Weight  10/28/23 : (!) 182 kg (401 lb 7.3 oz)    Image Results  ECG 12 lead  Sinus tachycardia  Abnormal R-wave progression, early transition  Abnormal T, consider ischemia, anterior leads    Confirmed by Ramicone, James ([id number]) on 10/7/2023 7:33:46 PM      Physical Exam  Constitutional:       General: He is not in acute distress.     Appearance: He is obese. He is not toxic-appearing.      Comments: Patient currently sitting up in a wheelchair.  Mother is at bedside.   HENT:      Head: Normocephalic and atraumatic.      Mouth/Throat:      Mouth: Mucous membranes are moist.      Pharynx: Oropharynx is clear.   Eyes:      Extraocular Movements: Extraocular movements intact.      Conjunctiva/sclera: Conjunctivae normal.      Pupils: Pupils are equal, round, and reactive to light.   Cardiovascular:      Rate and Rhythm: Normal rate and regular rhythm.   Pulmonary:      Comments: Diminished breath sounds due to body habitus.  Some slight wheezing noted.  Abdominal:      General: Bowel sounds are normal. There is distension.      Tenderness: There is abdominal tenderness (Generalized tenderness to abdominal area.). There is no guarding.   Musculoskeletal:         General: Swelling present.      Right lower leg: Edema present.       Left lower leg: Edema present.   Skin:     General: Skin is warm and dry.      Comments: Discoloration, edema, and swelling to bilateral lower extremities.  Wound on bottom left heel.  Dressing still on legs, going to wait for patient to be placed in a room and examined by podiatry to undress legs fully due to patient being in pain.   Neurological:      General: No focal deficit present.      Mental Status: He is alert and oriented to person, place, and time.   Psychiatric:         Mood and Affect: Mood normal.         Behavior: Behavior normal.     Relevant Results               Assessment/Plan        Active Problems:    Multiple open wounds of lower extremity, complicated, sequela    Unable to ambulate    Chris Chance is a 42 y.o. male with past medical history significant for morbid obesity, hypertension, hyperlipidemia, gout, asthma, and MICHAEL who presents with bilateral lower extremity pain and wounds.  Presents today due to inability to care for himself at home, states that house was not prepared for him after discharge from nursing home and patient want to be placed somewhere after discharge from hospital.  Recent Tissue/Wound culture on 10/13/2023 grew multiple MDRO's including abundant Enterobacter clocae complex & Moderate Acinetobacter calcoaceticus-baumannii complex.  Was taken vancomycin and then switched to oral Bactrim which she is still taking.  Patient states he had a recent surgical debridement done on September 29. Has been in nursing home and working with PT/OT for the past couple weeks.  States he tested positive for COVID about a week ago as well.  Was released from the nursing home yesterday and when he got back home he had difficulty getting up the stairs into the house and also had difficulty getting around inside the house.  States there was no new bigger bed and also no commode, says the house is not prepared for him.  Patient was ambulating around with a walker at home.       #History  of Bilateral leg wounds-edematous  #Leukocytosis, WBC 14.1 today  Podiatry consult, no surgical indication  ID consulted, appreciate recs -- change antibiotics to Merrem and Bactrim  Previous cultures reviewed  Social work consult for placement   Admit for observation  Q8 vitals  Cardiac diet  CBC, BMP in a.m.  ESR, CRP added on  Urinalysis  Tylenol as needed for mild pain  Oxycodone as needed for moderate pain     #Hypomagnesemia, level 1.33 today  Magnesium replacement  Repeat magnesium in the a.m.     #Shortness of breath, history of asthma  Albuterol as needed for shortness of breath or wheezing  Breo Ellipta     #Constipation  Colace, MiraLAX  MoM     #weakness in lower extremities  PT/OT for evaluation and treatment      Continue patient's at home medications as appropriate     Chronic conditions:  #Obesity  #Hypertension  #Hyperlipidemia  #Gout  #MICHAEL  #Asthma     #DVT prophylaxis  Lovenox  Ambulation as tolerated with walker       Dispo: will need SNF, awaiting new SNF, medically ready       Erik Avila,

## 2023-10-28 NOTE — PROGRESS NOTES
[facility] obtained authorization. [place] is not signed. RAULITO will follow up. Pt will be transported upon completion of [place]. [facility] notified [facility].    EMIGDIO KENYON

## 2023-10-28 NOTE — PROGRESS NOTES
Chris Chance is a 42 y.o. male on day 8 of admission presenting with Generalized weakness.      Subjective   No events overnight. Patient seen and examined at bedside. No complaints. Awaiting acceptance to new SNF.       Objective     Last Recorded Vitals  /74 (BP Location: Right arm, Patient Position: Sitting)   Pulse 107   Temp 36 °C (96.8 °F) (Temporal)   Resp 20   Wt (!) 182 kg (401 lb 7.3 oz)   SpO2 96%   Intake/Output last 3 Shifts:    Intake/Output Summary (Last 24 hours) at 10/28/2023 0818  Last data filed at 10/28/2023 0400  Gross per 24 hour   Intake 550 ml   Output 0 ml   Net 550 ml         Admission Weight  Weight: (!) 181 kg (400 lb) (10/19/23 1120)    Daily Weight  10/28/23 : (!) 182 kg (401 lb 7.3 oz)    Image Results  ECG 12 lead  Sinus tachycardia  Abnormal R-wave progression, early transition  Abnormal T, consider ischemia, anterior leads    Confirmed by Ramicone, James ([id number]) on 10/7/2023 7:33:46 PM      Physical Exam  Constitutional:       General: He is not in acute distress.     Appearance: He is obese. He is not toxic-appearing.      Comments: Patient currently sitting up in a wheelchair.  Mother is at bedside.   HENT:      Head: Normocephalic and atraumatic.      Mouth/Throat:      Mouth: Mucous membranes are moist.      Pharynx: Oropharynx is clear.   Eyes:      Extraocular Movements: Extraocular movements intact.      Conjunctiva/sclera: Conjunctivae normal.      Pupils: Pupils are equal, round, and reactive to light.   Cardiovascular:      Rate and Rhythm: Normal rate and regular rhythm.   Pulmonary:      Comments: Diminished breath sounds due to body habitus.  Some slight wheezing noted.  Abdominal:      General: Bowel sounds are normal. There is distension.      Tenderness: There is abdominal tenderness (Generalized tenderness to abdominal area.). There is no guarding.   Musculoskeletal:         General: Swelling present.      Right lower leg: Edema present.      Left  lower leg: Edema present.   Skin:     General: Skin is warm and dry.      Comments: Discoloration, edema, and swelling to bilateral lower extremities.  Wound on bottom left heel.  Dressing still on legs, going to wait for patient to be placed in a room and examined by podiatry to undress legs fully due to patient being in pain.   Neurological:      General: No focal deficit present.      Mental Status: He is alert and oriented to person, place, and time.   Psychiatric:         Mood and Affect: Mood normal.         Behavior: Behavior normal.     Relevant Results               Assessment/Plan        Active Problems:    Multiple open wounds of lower extremity, complicated, sequela    Unable to ambulate    Chris Chance is a 42 y.o. male with past medical history significant for morbid obesity, hypertension, hyperlipidemia, gout, asthma, and MICHAEL who presents with bilateral lower extremity pain and wounds.  Presents today due to inability to care for himself at home, states that house was not prepared for him after discharge from nursing home and patient want to be placed somewhere after discharge from hospital.  Recent Tissue/Wound culture on 10/13/2023 grew multiple MDRO's including abundant Enterobacter clocae complex & Moderate Acinetobacter calcoaceticus-baumannii complex.  Was taken vancomycin and then switched to oral Bactrim which she is still taking.  Patient states he had a recent surgical debridement done on September 29. Has been in nursing home and working with PT/OT for the past couple weeks.  States he tested positive for COVID about a week ago as well.  Was released from the nursing home yesterday and when he got back home he had difficulty getting up the stairs into the house and also had difficulty getting around inside the house.  States there was no new bigger bed and also no commode, says the house is not prepared for him.  Patient was ambulating around with a walker at home.       #History of  Bilateral leg wounds-edematous  #Leukocytosis, WBC 14.1 today  Podiatry consult, no surgical indication  ID consulted, appreciate recs -- change antibiotics to Merrem and Bactrim  Previous cultures reviewed  Social work consult for placement   Admit for observation  Q8 vitals  Cardiac diet  CBC, BMP in a.m.  ESR, CRP added on  Urinalysis  Tylenol as needed for mild pain  Oxycodone as needed for moderate pain     #Hypomagnesemia, level 1.33 today  Magnesium replacement  Repeat magnesium in the a.m.     #Shortness of breath, history of asthma  Albuterol as needed for shortness of breath or wheezing  Breo Ellipta     #Constipation  Colace, MiraLAX  MoM     #weakness in lower extremities  PT/OT for evaluation and treatment      Continue patient's at home medications as appropriate     Chronic conditions:  #Obesity  #Hypertension  #Hyperlipidemia  #Gout  #MICHAEL  #Asthma     #DVT prophylaxis  Lovenox  Ambulation as tolerated with walker       Dispo: will need SNF, awaiting new SNF, medically ready       Erik Avila,

## 2023-10-28 NOTE — CARE PLAN
The patient's goals for the shift include  patient will be able to start taking care of his adl    The clinical goals for the shift include Patient will continue to have good pain control    Over the shift, the patient did not make progress toward the following goals. Barriers to progression include patint still not able to do basic adl. Recommendations to address these barriers include continue to incourge him to try and care for himself.

## 2023-10-28 NOTE — CARE PLAN
The patient's goals for the shift include      The clinical goals for the shift include Patient will continue to have good pain control    Over the shift, the patient did not make progress toward the following goals. Barriers to progression include reporting pain early. Recommendations to address these barriers include assess pain frequently.

## 2023-10-29 VITALS
DIASTOLIC BLOOD PRESSURE: 63 MMHG | RESPIRATION RATE: 18 BRPM | SYSTOLIC BLOOD PRESSURE: 137 MMHG | OXYGEN SATURATION: 96 % | HEIGHT: 67 IN | TEMPERATURE: 96.8 F | HEART RATE: 111 BPM | BODY MASS INDEX: 49.44 KG/M2 | WEIGHT: 315 LBS

## 2023-10-29 PROCEDURE — 99239 HOSP IP/OBS DSCHRG MGMT >30: CPT | Performed by: INTERNAL MEDICINE

## 2023-10-29 PROCEDURE — 2500000004 HC RX 250 GENERAL PHARMACY W/ HCPCS (ALT 636 FOR OP/ED): Performed by: PHYSICIAN ASSISTANT

## 2023-10-29 PROCEDURE — 2500000004 HC RX 250 GENERAL PHARMACY W/ HCPCS (ALT 636 FOR OP/ED): Performed by: NURSE PRACTITIONER

## 2023-10-29 PROCEDURE — 2500000004 HC RX 250 GENERAL PHARMACY W/ HCPCS (ALT 636 FOR OP/ED): Performed by: INTERNAL MEDICINE

## 2023-10-29 PROCEDURE — 96372 THER/PROPH/DIAG INJ SC/IM: CPT | Performed by: PHYSICIAN ASSISTANT

## 2023-10-29 PROCEDURE — 2500000001 HC RX 250 WO HCPCS SELF ADMINISTERED DRUGS (ALT 637 FOR MEDICARE OP): Performed by: PHYSICIAN ASSISTANT

## 2023-10-29 PROCEDURE — 2500000001 HC RX 250 WO HCPCS SELF ADMINISTERED DRUGS (ALT 637 FOR MEDICARE OP): Performed by: NURSE PRACTITIONER

## 2023-10-29 PROCEDURE — 94640 AIRWAY INHALATION TREATMENT: CPT

## 2023-10-29 RX ADMIN — ACETAMINOPHEN 650 MG: 325 TABLET ORAL at 07:29

## 2023-10-29 RX ADMIN — SULFAMETHOXAZOLE AND TRIMETHOPRIM 1 TABLET: 800; 160 TABLET ORAL at 09:32

## 2023-10-29 RX ADMIN — Medication 1 TABLET: at 09:32

## 2023-10-29 RX ADMIN — ACETAMINOPHEN 650 MG: 325 TABLET ORAL at 14:48

## 2023-10-29 RX ADMIN — ACETAMINOPHEN 650 MG: 325 TABLET ORAL at 00:01

## 2023-10-29 RX ADMIN — FLUTICASONE FUROATE AND VILANTEROL TRIFENATATE 1 PUFF: 200; 25 POWDER RESPIRATORY (INHALATION) at 07:29

## 2023-10-29 RX ADMIN — DOCUSATE SODIUM 100 MG: 100 CAPSULE, LIQUID FILLED ORAL at 09:32

## 2023-10-29 RX ADMIN — MAGNESIUM OXIDE TAB 400 MG (241.3 MG ELEMENTAL MG) 400 MG: 400 (241.3 MG) TAB at 09:32

## 2023-10-29 RX ADMIN — NYSTATIN: 100000 POWDER TOPICAL at 09:00

## 2023-10-29 RX ADMIN — ENOXAPARIN SODIUM 60 MG: 80 INJECTION SUBCUTANEOUS at 09:32

## 2023-10-29 RX ADMIN — FUROSEMIDE 20 MG: 10 INJECTION, SOLUTION INTRAMUSCULAR; INTRAVENOUS at 09:32

## 2023-10-29 ASSESSMENT — PAIN - FUNCTIONAL ASSESSMENT
PAIN_FUNCTIONAL_ASSESSMENT: 0-10

## 2023-10-29 ASSESSMENT — PAIN SCALES - GENERAL
PAINLEVEL_OUTOF10: 0 - NO PAIN
PAINLEVEL_OUTOF10: 6
PAINLEVEL_OUTOF10: 1
PAINLEVEL_OUTOF10: 3
PAINLEVEL_OUTOF10: 4

## 2023-10-29 ASSESSMENT — PAIN SCALES - WONG BAKER
WONGBAKER_NUMERICALRESPONSE: HURTS LITTLE MORE
WONGBAKER_NUMERICALRESPONSE: HURTS LITTLE MORE

## 2023-10-29 NOTE — DISCHARGE SUMMARY
Discharge Diagnosis  Generalized weakness    Issues Requiring Follow-Up  Wound care    Discharge Meds     Your medication list        START taking these medications        Instructions Last Dose Given Next Dose Due   furosemide 10 mg/mL injection  Commonly known as: Lasix      Infuse 2 mL (20 mg) into a venous catheter once daily. Do not start before October 26, 2023.       magnesium oxide 400 mg (241.3 mg magnesium) tablet  Commonly known as: Mag-Ox      Take 1 tablet (400 mg) by mouth once daily. Do not start before October 26, 2023.              CHANGE how you take these medications        Instructions Last Dose Given Next Dose Due   metoprolol succinate XL 25 mg 24 hr tablet  Commonly known as: Toprol-XL  What changed: Another medication with the same name was changed. Make sure you understand how and when to take each.      Take 1 tablet (25 mg) by mouth once daily. Do not crush or chew. Do not start before October 3, 2023.       metoprolol succinate XL 50 mg 24 hr tablet  Commonly known as: Toprol-XL  What changed: when to take this      Take 1 tablet (50 mg) by mouth once daily. Do not crush or chew. Do not start before October 4, 2023.              CONTINUE taking these medications        Instructions Last Dose Given Next Dose Due   acetaminophen 325 mg tablet  Commonly known as: Tylenol      Take 2 tablets (650 mg) by mouth every 4 hours if needed for fever (temp greater than 38.0 C) (pain).       albuterol 90 mcg/actuation inhaler      Inhale 2 puffs every 6 hours if needed for wheezing or shortness of breath.       docusate sodium 100 mg capsule  Commonly known as: Colace           lactobacillus acidophilus capsule      Take 1 capsule by mouth once daily.       lactobacillus acidophilus tablet tablet      Take 1 tablet by mouth once daily. Do not start before October 6, 2023.       nystatin 100,000 unit/gram powder  Commonly known as: Mycostatin      Apply topically 2 times a day.       oxyCODONE 5 mg  immediate release tablet  Commonly known as: Roxicodone      Take 1 tablet (5 mg) by mouth every 4 hours if needed for moderate pain (4 - 6).       polyethylene glycol 17 gram packet  Commonly known as: Glycolax, Miralax      Take 17 g by mouth once daily. Do not start before October 6, 2023.       simethicone 80 mg chewable tablet  Commonly known as: Mylicon      Chew 1 tablet (80 mg) 3 times a day as needed (gas).       sulfamethoxazole-trimethoprim 800-160 mg tablet  Commonly known as: Bactrim DS      Take 1 tablet by mouth 2 times a day for 7 days.       Symbicort 160-4.5 mcg/actuation inhaler  Generic drug: budesonide-formoteroL                  STOP taking these medications      vancomycin 125 mg capsule  Commonly known as: Vancocin                  Where to Get Your Medications        Information about where to get these medications is not yet available    Ask your nurse or doctor about these medications  furosemide 10 mg/mL injection  magnesium oxide 400 mg (241.3 mg magnesium) tablet  sulfamethoxazole-trimethoprim 800-160 mg tablet         Test Results Pending At Discharge  Pending Labs       No current pending labs.            Hospital Course   Chris Chance is a 42 y.o. male with past medical history significant for morbid obesity, hypertension, hyperlipidemia, gout, asthma, and MICHAEL who presents with bilateral lower extremity pain and wounds.  Presents today due to inability to care for himself at home, states that house was not prepared for him after discharge from nursing home and patient want to be placed somewhere after discharge from hospital.  Recent Tissue/Wound culture on 10/13/2023 grew multiple MDRO's including abundant Enterobacter clocae complex & Moderate Acinetobacter calcoaceticus-baumannii complex.  Was taken vancomycin and then switched to oral Bactrim which she is still taking.  Patient states he had a recent surgical debridement done on September 29. Has been in nursing home and  working with PT/OT for the past couple weeks.  States he tested positive for COVID about a week ago as well.  Was released from the nursing home yesterday and when he got back home he had difficulty getting up the stairs into the house and also had difficulty getting around inside the house.  States there was no new bigger bed and also no commode, says the house is not prepared for him.  Patient was ambulating around with a walker at home.       #History of Bilateral leg wounds-edematous  #Leukocytosis, WBC 14.1 today  Podiatry consult, no surgical indication  ID consulted, appreciate recs -- change antibiotics to Merrem and Bactrim  Previous cultures reviewed  Social work consult for placement   Admit for observation  Q8 vitals  Cardiac diet  CBC, BMP in a.m.  ESR, CRP added on  Urinalysis  Tylenol as needed for mild pain  Oxycodone as needed for moderate pain     #Hypomagnesemia, level 1.33 today  Magnesium replacement  Repeat magnesium in the a.m.     #Shortness of breath, history of asthma  Albuterol as needed for shortness of breath or wheezing  Breo Ellipta     #Constipation  Colace, MiraLAX  MoM     #weakness in lower extremities  PT/OT for evaluation and treatment       Dispo: discharged to SNF for PT and wound care    Pertinent Physical Exam At Time of Discharge  Physical Exam  Constitutional:       General: He is not in acute distress.     Appearance: He is obese. He is not toxic-appearing.      Comments: Patient currently sitting up in a wheelchair.  Mother is at bedside.   HENT:      Head: Normocephalic and atraumatic.      Mouth/Throat:      Mouth: Mucous membranes are moist.      Pharynx: Oropharynx is clear.   Eyes:      Extraocular Movements: Extraocular movements intact.      Conjunctiva/sclera: Conjunctivae normal.      Pupils: Pupils are equal, round, and reactive to light.   Cardiovascular:      Rate and Rhythm: Normal rate and regular rhythm.   Pulmonary:      Comments: Diminished breath sounds  due to body habitus.  Some slight wheezing noted.  Abdominal:      General: Bowel sounds are normal. There is distension.      Tenderness: There is abdominal tenderness (Generalized tenderness to abdominal area.). There is no guarding.   Musculoskeletal:         General: Swelling present.      Right lower leg: Edema present.      Left lower leg: Edema present.   Skin:     General: Skin is warm and dry.      Comments: Discoloration, edema, and swelling to bilateral lower extremities.  Wound on bottom left heel.  Dressing still on legs, going to wait for patient to be placed in a room and examined by podiatry to undress legs fully due to patient being in pain.   Neurological:      General: No focal deficit present.      Mental Status: He is alert and oriented to person, place, and time.   Psychiatric:         Mood and Affect: Mood normal.         Behavior: Behavior normal.     Outpatient Follow-Up  No future appointments.      Erik Avila, DO

## 2023-10-29 NOTE — CARE PLAN
The patient's goals for the shift include      The clinical goals for the shift include improved pain control this shift

## 2023-10-29 NOTE — CARE PLAN
The patient's goals for the shift include      The clinical goals for the shift include improved pain control this shift    Over the shift, the patient did not make progress toward the following goals. Barriers to progression include patient not reaching out for PRN medication. Recommendations to address these barriers include education.    Problem: Skin  Goal: Decreased wound size/increased tissue granulation at next dressing change  Outcome: Not Progressing  Goal: Participates in plan/prevention/treatment measures  Outcome: Not Progressing  Goal: Prevent/manage excess moisture  Outcome: Not Progressing  Goal: Prevent/minimize sheer/friction injuries  Outcome: Not Progressing  Goal: Promote/optimize nutrition  Outcome: Not Progressing  Goal: Promote skin healing  Outcome: Not Progressing  Flowsheets (Taken 10/29/2023 1300)  Promote skin healing:   Ensure correct size (line/device) and apply per  instructions   Assess skin/pad under line(s)/device(s)   Protective dressings over bony prominences   Turn/reposition every 2 hours/use positioning/transfer devices   Rotate device position/do not position patient on device     Problem: Pain  Goal: My pain/discomfort is manageable  Outcome: Not Progressing     Problem: Daily Care  Goal: Daily care needs are met  Outcome: Not Progressing

## 2023-10-29 NOTE — PROGRESS NOTES
RAULITO notified Dr. Avila about incomplete [place]. Awaiting completion. RAULITO will continue to follow up and assist with dc to [facility]. Auth obtained.    EMIGDIO KENYON

## 2023-10-30 NOTE — PROGRESS NOTES
PROGRESS NOTE    Subjective   Chief complaint: Chris Chance is a 43 y.o. male who is an acute skilled patient being seen and evaluated for weakness    HPI:  10/6/23 Patient is a 42 year old male with a past medical history of HTN, HLD, gout and MICHAEL. Patient was admitted to the SNF after being in the hospital. Patient presented to the hospital for bilateral lower extremity pain. Pt states pain was 10/10 with ambulation. Patient had BLE cellulitis. Patient was given vanco and zosyn. Patient was also given IV fluid. Patient was evaluated by PT/OT and discharged to skilled nursing facility.     10/18/23 Patient admitted to SNF for therapy d/t weakness after recent hospitalization.   Patient requires assist with ADLs and transfers.  No new complaints.        Objective   Vital signs: 147/80, 98, 90, 95%    Physical Exam  Constitutional:       General: He is not in acute distress.  Eyes:      Extraocular Movements: Extraocular movements intact.   Pulmonary:      Effort: Pulmonary effort is normal.   Musculoskeletal:      Cervical back: Neck supple.   Neurological:      Mental Status: He is alert.   Psychiatric:         Mood and Affect: Mood normal.         Behavior: Behavior is cooperative.         Assessment/Plan   Problem List Items Addressed This Visit       Benign essential hypertension - Primary     Monitor          Weakness     Pt/ot           Medications, treatments, and labs reviewed  Continue medications and treatments as listed in [facility]    Scribe Attestation  IAlisa Scribe   attest that this documentation has been prepared under the direction and in the presence of DODIE Vizcaino.    Provider Attestation - Scribe documentation  All medical record entries made by the Scribe were at my direction and personally dictated by me. I have reviewed the chart and agree that the record accurately reflects my personal performance of the history, physical exam, discussion and  plan.    Erik Wiley, APRN-CNP

## 2023-11-01 ENCOUNTER — NURSING HOME VISIT (OUTPATIENT)
Dept: POST ACUTE CARE | Facility: EXTERNAL LOCATION | Age: 43
End: 2023-11-01

## 2023-11-01 ENCOUNTER — OFFICE VISIT (OUTPATIENT)
Dept: WOUND CARE | Facility: CLINIC | Age: 43
End: 2023-11-01
Payer: COMMERCIAL

## 2023-11-01 DIAGNOSIS — I10 BENIGN ESSENTIAL HYPERTENSION: ICD-10-CM

## 2023-11-01 DIAGNOSIS — J45.20 MILD INTERMITTENT ASTHMA WITHOUT COMPLICATION (HHS-HCC): ICD-10-CM

## 2023-11-01 DIAGNOSIS — R53.1 WEAKNESS: Primary | ICD-10-CM

## 2023-11-01 PROCEDURE — 99308 SBSQ NF CARE LOW MDM 20: CPT | Performed by: REGISTERED NURSE

## 2023-11-01 NOTE — LETTER
Patient: Chris Chance  : 1980    Encounter Date: 2023    PROGRESS NOTE    Subjective  Chief complaint: Chris Chance is a 43 y.o. male who is an acute skilled patient being seen and evaluated for weakness    HPI:  23 Patient admitted to SNF for therapy d/t weakness after recent hospitalization.   Patient requires assist with ADLs and transfers.  No new complaints.        Objective  Vital signs: 152/80, 94, 110.0, 94%    Physical Exam  Constitutional:       General: He is not in acute distress.  Eyes:      Extraocular Movements: Extraocular movements intact.   Pulmonary:      Effort: Pulmonary effort is normal.   Musculoskeletal:      Cervical back: Neck supple.   Neurological:      Mental Status: He is alert.   Psychiatric:         Mood and Affect: Mood normal.         Behavior: Behavior is cooperative.         Assessment/Plan  Problem List Items Addressed This Visit       Asthma     Stable monitor          Benign essential hypertension     Monitor          Weakness - Primary     Pt/ot           Medications, treatments, and labs reviewed  Continue medications and treatments as listed in [facility]    Scribe Attestation  IAlisa Scribe   attest that this documentation has been prepared under the direction and in the presence of DODIE Vizcaino.    Provider Attestation - Scribe documentation  All medical record entries made by the Scribe were at my direction and personally dictated by me. I have reviewed the chart and agree that the record accurately reflects my personal performance of the history, physical exam, discussion and plan.    DODIE Vizcaino        Electronically Signed By: DODIE Vizcaino   23 10:36 AM

## 2023-11-03 ENCOUNTER — NURSING HOME VISIT (OUTPATIENT)
Dept: POST ACUTE CARE | Facility: EXTERNAL LOCATION | Age: 43
End: 2023-11-03
Payer: COMMERCIAL

## 2023-11-03 DIAGNOSIS — R53.1 WEAKNESS: Primary | ICD-10-CM

## 2023-11-03 DIAGNOSIS — I10 BENIGN ESSENTIAL HYPERTENSION: ICD-10-CM

## 2023-11-03 DIAGNOSIS — M79.89 LEG SWELLING: ICD-10-CM

## 2023-11-03 PROCEDURE — 99308 SBSQ NF CARE LOW MDM 20: CPT | Performed by: REGISTERED NURSE

## 2023-11-03 NOTE — LETTER
Patient: Chris Chance  : 1980    Encounter Date: 2023    PROGRESS NOTE    Subjective  Chief complaint: Chris Chance is a 43 y.o. male who is an acute skilled patient being seen and evaluated for weakness    HPI:  23 Patient admitted to SNF for therapy d/t weakness after recent hospitalization.   Patient requires assist with ADLs and transfers.  No new complaints.      11/3/23 Patient presents for f/u therapy and general medical care.  Patient seen and examined at [facility].  Therapy has been working with the patient to improve strength, endurance, ADLs, and transfers d/t generalized weakness.  Patient has no acute concerns today.      Objective  Vital signs: 148/80, 98.2, 115.0, 95%    Physical Exam  Constitutional:       General: He is not in acute distress.  Eyes:      Extraocular Movements: Extraocular movements intact.   Pulmonary:      Effort: Pulmonary effort is normal.   Musculoskeletal:      Cervical back: Neck supple.   Neurological:      Mental Status: He is alert.   Psychiatric:         Mood and Affect: Mood normal.         Behavior: Behavior is cooperative.         Assessment/Plan  Problem List Items Addressed This Visit       Benign essential hypertension     Monitor          Weakness - Primary     Pt/ot          Leg swelling     Elevate low sodium   Compression           Medications, treatments, and labs reviewed  Continue medications and treatments as listed in [facility]    Scribe Attestation  IAlisa Scribe   attest that this documentation has been prepared under the direction and in the presence of DODIE Vizcaino.    Provider Attestation - Scribe documentation  All medical record entries made by the Scribe were at my direction and personally dictated by me. I have reviewed the chart and agree that the record accurately reflects my personal performance of the history, physical exam, discussion and plan.    Erik Wiley  LAURA-CNP        Electronically Signed By: DODIE Vizcaino   11/28/23  6:23 PM

## 2023-11-06 ENCOUNTER — NURSING HOME VISIT (OUTPATIENT)
Dept: POST ACUTE CARE | Facility: EXTERNAL LOCATION | Age: 43
End: 2023-11-06
Payer: COMMERCIAL

## 2023-11-06 DIAGNOSIS — R53.1 WEAKNESS: Primary | ICD-10-CM

## 2023-11-06 DIAGNOSIS — T14.8XXA MUSCLE STRAIN: ICD-10-CM

## 2023-11-06 DIAGNOSIS — I10 BENIGN ESSENTIAL HYPERTENSION: ICD-10-CM

## 2023-11-06 PROCEDURE — 99308 SBSQ NF CARE LOW MDM 20: CPT | Performed by: REGISTERED NURSE

## 2023-11-06 NOTE — LETTER
Patient: Chris Chance  : 1980    Encounter Date: 2023    PROGRESS NOTE    Subjective  Chief complaint: Chris Chance is a 43 y.o. male who is an acute skilled patient being seen and evaluated for weakness    HPI:  23 Patient seen and examined at bedside.  Patient denies n/v/f/c.  Continues working in therapy.  No new complaints.      Objective  Vital signs: 130/78, 97.6, 70, 97%    Physical Exam  Constitutional:       General: He is not in acute distress.  Eyes:      Extraocular Movements: Extraocular movements intact.   Pulmonary:      Effort: Pulmonary effort is normal.   Musculoskeletal:      Cervical back: Neck supple.   Neurological:      Mental Status: He is alert.   Psychiatric:         Mood and Affect: Mood normal.         Behavior: Behavior is cooperative.         Assessment/Plan  Problem List Items Addressed This Visit       Benign essential hypertension     Monitor          Weakness - Primary     Pt/ot          Muscle strain     Improving continue PT OT          Medications, treatments, and labs reviewed  Continue medications and treatments as listed in [facility]    Scribe Attestation  IAlisa Scribe   attest that this documentation has been prepared under the direction and in the presence of DODIE Vizcaino.    Provider Attestation - Scribe documentation  All medical record entries made by the Scribe were at my direction and personally dictated by me. I have reviewed the chart and agree that the record accurately reflects my personal performance of the history, physical exam, discussion and plan.    DODIE Vizcaino        Electronically Signed By: DODIE Vizcaino   23  9:14 AM

## 2023-11-07 ENCOUNTER — NURSING HOME VISIT (OUTPATIENT)
Dept: POST ACUTE CARE | Facility: EXTERNAL LOCATION | Age: 43
End: 2023-11-07
Payer: COMMERCIAL

## 2023-11-07 DIAGNOSIS — R53.1 WEAKNESS: Primary | ICD-10-CM

## 2023-11-07 DIAGNOSIS — I10 BENIGN ESSENTIAL HYPERTENSION: ICD-10-CM

## 2023-11-07 PROCEDURE — 99308 SBSQ NF CARE LOW MDM 20: CPT | Performed by: REGISTERED NURSE

## 2023-11-07 NOTE — PROGRESS NOTES
PROGRESS NOTE    Subjective   Chief complaint: Chris Chance is a 43 y.o. male who is an acute skilled patient being seen and evaluated for weakness    HPI:  11/1/23 Patient admitted to SNF for therapy d/t weakness after recent hospitalization.   Patient requires assist with ADLs and transfers.  No new complaints.        Objective   Vital signs: 152/80, 94, 110.0, 94%    Physical Exam  Constitutional:       General: He is not in acute distress.  Eyes:      Extraocular Movements: Extraocular movements intact.   Pulmonary:      Effort: Pulmonary effort is normal.   Musculoskeletal:      Cervical back: Neck supple.   Neurological:      Mental Status: He is alert.   Psychiatric:         Mood and Affect: Mood normal.         Behavior: Behavior is cooperative.         Assessment/Plan   Problem List Items Addressed This Visit       Asthma     Stable monitor          Benign essential hypertension     Monitor          Weakness - Primary     Pt/ot           Medications, treatments, and labs reviewed  Continue medications and treatments as listed in [facility]    Scribe Attestation  Alisa GORDON Scribe   attest that this documentation has been prepared under the direction and in the presence of DODIE Vizcaino.    Provider Attestation - Scribe documentation  All medical record entries made by the Scribe were at my direction and personally dictated by me. I have reviewed the chart and agree that the record accurately reflects my personal performance of the history, physical exam, discussion and plan.    DODIE Vizcaino

## 2023-11-07 NOTE — LETTER
Patient: Chris Chance  : 1980    Encounter Date: 2023    PROGRESS NOTE    Subjective  Chief complaint: Chris Chance is a 43 y.o. male who is an acute skilled patient being seen and evaluated for weakness    HPI:  23 Patient seen and examined at bedside.  Patient denies n/v/f/c.  Continues working in therapy.  No new complaints.    23 Patient has no acute complaints.  No new issues per nursing.  Continues working in therapy      Objective  Vital signs: 144/88, 98, 110.0, 95%    Physical Exam  Constitutional:       General: He is not in acute distress.  Eyes:      Extraocular Movements: Extraocular movements intact.   Pulmonary:      Effort: Pulmonary effort is normal.   Musculoskeletal:      Cervical back: Neck supple.   Neurological:      Mental Status: He is alert.   Psychiatric:         Mood and Affect: Mood normal.         Behavior: Behavior is cooperative.         Assessment/Plan  Problem List Items Addressed This Visit       Benign essential hypertension     Monitor          Weakness - Primary     Pt/ot           Medications, treatments, and labs reviewed  Continue medications and treatments as listed in [facility]    Scribe Attestation  IAlisa Scribe   attest that this documentation has been prepared under the direction and in the presence of DODIE Vizcaino.    Provider Attestation - Scribe documentation  All medical record entries made by the Scribe were at my direction and personally dictated by me. I have reviewed the chart and agree that the record accurately reflects my personal performance of the history, physical exam, discussion and plan.    DODIE Vizcaino        Electronically Signed By: DODIE Vizcaino   23  9:33 AM

## 2023-11-08 ENCOUNTER — NURSING HOME VISIT (OUTPATIENT)
Dept: POST ACUTE CARE | Facility: EXTERNAL LOCATION | Age: 43
End: 2023-11-08
Payer: COMMERCIAL

## 2023-11-08 DIAGNOSIS — R53.1 WEAKNESS: Primary | ICD-10-CM

## 2023-11-08 DIAGNOSIS — I10 BENIGN ESSENTIAL HYPERTENSION: ICD-10-CM

## 2023-11-08 PROCEDURE — 99308 SBSQ NF CARE LOW MDM 20: CPT | Performed by: REGISTERED NURSE

## 2023-11-08 NOTE — LETTER
Patient: Chris Chance  : 1980    Encounter Date: 2023    PROGRESS NOTE    Subjective  Chief complaint: Chris Chance is a 43 y.o. male who is an acute skilled patient being seen and evaluated for weakness    HPI:  23 Patient seen and examined at bedside.  Patient denies n/v/f/c.  Continues working in therapy.  No new complaints.    23 Patient has no acute complaints.  No new issues per nursing.  Continues working in therapy    23 Patient presents for f/u therapy and general medical care.  Patient seen and examined at beside.  Therapy has been working with the patient to improve strength, endurance, ADLs, and transfers d/t generalized weakness.  Patient has no acute concerns today.      Objective  Vital signs: 137/69, 98.9, 112, 20, 94%    Physical Exam  Constitutional:       General: He is not in acute distress.  Eyes:      Extraocular Movements: Extraocular movements intact.   Pulmonary:      Effort: Pulmonary effort is normal.   Musculoskeletal:      Cervical back: Neck supple.   Neurological:      Mental Status: He is alert.   Psychiatric:         Mood and Affect: Mood normal.         Behavior: Behavior is cooperative.         Assessment/Plan  Problem List Items Addressed This Visit       Benign essential hypertension     Monitor          Weakness - Primary     Pt/ot           Medications, treatments, and labs reviewed  Continue medications and treatments as listed in [facility]    Scribe Attestation  Alisa GORDON Scribe   attest that this documentation has been prepared under the direction and in the presence of DODIE Vizcaino.    Provider Attestation - Scribe documentation  All medical record entries made by the Scribe were at my direction and personally dictated by me. I have reviewed the chart and agree that the record accurately reflects my personal performance of the history, physical exam, discussion and plan.    Erik Wiley,  APRN-CNP        Electronically Signed By: DODIE Vizcaino   11/30/23 11:06 AM

## 2023-11-08 NOTE — PROGRESS NOTES
PROGRESS NOTE    Subjective   Chief complaint: Chris Chance is a 43 y.o. male who is an acute skilled patient being seen and evaluated for weakness    HPI:  10/16/23 Patient has no new complaints or concerns today.  Pt is COVID-19 positive. Continues working towards goals in therapy.  Denies constitutional symptoms.      Objective   Vital signs: 146/80, 98, 88, 94%    Physical Exam  Constitutional:       General: He is not in acute distress.  Eyes:      Extraocular Movements: Extraocular movements intact.   Pulmonary:      Effort: Pulmonary effort is normal.   Musculoskeletal:      Cervical back: Neck supple.   Neurological:      Mental Status: He is alert.   Psychiatric:         Mood and Affect: Mood normal.         Behavior: Behavior is cooperative.         Assessment/Plan   Problem List Items Addressed This Visit       Weakness     Pt/ot          Pain in both feet     Pain stable monitor          COVID-19 - Primary     Continue isolation symptom mgmt          Medications, treatments, and labs reviewed  Continue medications and treatments as listed in [facility]    Scribe Attestation  Alisa GORDON Scribe   attest that this documentation has been prepared under the direction and in the presence of DODIE Vizcaino.    Provider Attestation - Scribe documentation  All medical record entries made by the Scribe were at my direction and personally dictated by me. I have reviewed the chart and agree that the record accurately reflects my personal performance of the history, physical exam, discussion and plan.    DODIE Vizcaino

## 2023-11-09 DIAGNOSIS — S81.802S OPEN WOUND OF LEFT LOWER EXTREMITY, SEQUELA: ICD-10-CM

## 2023-11-09 DIAGNOSIS — L03.90 CELLULITIS, UNSPECIFIED: ICD-10-CM

## 2023-11-09 DIAGNOSIS — M54.50 CHRONIC LOW BACK PAIN, UNSPECIFIED BACK PAIN LATERALITY, UNSPECIFIED WHETHER SCIATICA PRESENT: ICD-10-CM

## 2023-11-09 DIAGNOSIS — G89.29 CHRONIC LOW BACK PAIN, UNSPECIFIED BACK PAIN LATERALITY, UNSPECIFIED WHETHER SCIATICA PRESENT: ICD-10-CM

## 2023-11-09 DIAGNOSIS — J45.20 MILD INTERMITTENT ASTHMA WITHOUT COMPLICATION (HHS-HCC): ICD-10-CM

## 2023-11-09 DIAGNOSIS — E66.09 OBESITY DUE TO EXCESS CALORIES WITH SERIOUS COMORBIDITY, UNSPECIFIED CLASSIFICATION: ICD-10-CM

## 2023-11-09 DIAGNOSIS — K59.00 CONSTIPATION, UNSPECIFIED CONSTIPATION TYPE: ICD-10-CM

## 2023-11-09 DIAGNOSIS — I87.303 STASIS EDEMA OF BOTH LOWER EXTREMITIES: ICD-10-CM

## 2023-11-09 PROBLEM — U07.1 COVID-19: Status: ACTIVE | Noted: 2023-11-09

## 2023-11-09 PROBLEM — R53.1 WEAKNESS: Status: ACTIVE | Noted: 2023-07-18

## 2023-11-09 NOTE — PROGRESS NOTES
PROGRESS NOTE    Subjective   Chief complaint: Chris Chance is a 43 y.o. male who is an acute skilled patient being seen and evaluated for weakness    HPI:  11/6/23 Patient seen and examined at bedside.  Patient denies n/v/f/c.  Continues working in therapy.  No new complaints.      Objective   Vital signs: 130/78, 97.6, 70, 97%    Physical Exam  Constitutional:       General: He is not in acute distress.  Eyes:      Extraocular Movements: Extraocular movements intact.   Pulmonary:      Effort: Pulmonary effort is normal.   Musculoskeletal:      Cervical back: Neck supple.   Neurological:      Mental Status: He is alert.   Psychiatric:         Mood and Affect: Mood normal.         Behavior: Behavior is cooperative.         Assessment/Plan   Problem List Items Addressed This Visit       Benign essential hypertension     Monitor          Weakness - Primary     Pt/ot          Muscle strain     Improving continue PT OT          Medications, treatments, and labs reviewed  Continue medications and treatments as listed in [facility]    Scribe Attestation  Alisa GORDON Scribe   attest that this documentation has been prepared under the direction and in the presence of DODIE Vizcaino.    Provider Attestation - Scribe documentation  All medical record entries made by the Scribe were at my direction and personally dictated by me. I have reviewed the chart and agree that the record accurately reflects my personal performance of the history, physical exam, discussion and plan.    DODIE Vizcaino

## 2023-11-09 NOTE — PROGRESS NOTES
PROGRESS NOTE    Subjective   Chief complaint: Chris Chance is a 43 y.o. male who is an acute skilled patient being seen and evaluated for weakness    HPI:  11/1/23 Patient admitted to SNF for therapy d/t weakness after recent hospitalization.   Patient requires assist with ADLs and transfers.  No new complaints.      11/3/23 Patient presents for f/u therapy and general medical care.  Patient seen and examined at [facility].  Therapy has been working with the patient to improve strength, endurance, ADLs, and transfers d/t generalized weakness.  Patient has no acute concerns today.      Objective   Vital signs: 148/80, 98.2, 115.0, 95%    Physical Exam  Constitutional:       General: He is not in acute distress.  Eyes:      Extraocular Movements: Extraocular movements intact.   Pulmonary:      Effort: Pulmonary effort is normal.   Musculoskeletal:      Cervical back: Neck supple.   Neurological:      Mental Status: He is alert.   Psychiatric:         Mood and Affect: Mood normal.         Behavior: Behavior is cooperative.         Assessment/Plan   Problem List Items Addressed This Visit       Benign essential hypertension     Monitor          Weakness - Primary     Pt/ot          Leg swelling     Elevate low sodium   Compression           Medications, treatments, and labs reviewed  Continue medications and treatments as listed in [facility]    Scribe Attestation  I, Jim Caraballo   attest that this documentation has been prepared under the direction and in the presence of DODIE Vizcaino.    Provider Attestation - Scribe documentation  All medical record entries made by the Scribe were at my direction and personally dictated by me. I have reviewed the chart and agree that the record accurately reflects my personal performance of the history, physical exam, discussion and plan.    DODIE Vizcaino

## 2023-11-10 RX ORDER — LANOLIN ALCOHOL/MO/W.PET/CERES
400 CREAM (GRAM) TOPICAL DAILY
Qty: 90 TABLET | Refills: 3 | Status: SHIPPED | OUTPATIENT
Start: 2023-11-10

## 2023-11-10 RX ORDER — POLYETHYLENE GLYCOL 3350 17 G/17G
17 POWDER, FOR SOLUTION ORAL DAILY
Qty: 90 PACKET | Refills: 3 | Status: ON HOLD | OUTPATIENT
Start: 2023-11-10 | End: 2024-05-20 | Stop reason: ALTCHOICE

## 2023-11-10 RX ORDER — DOCUSATE SODIUM 100 MG/1
100 CAPSULE, LIQUID FILLED ORAL 2 TIMES DAILY PRN
Qty: 60 CAPSULE | Refills: 3 | Status: ON HOLD | OUTPATIENT
Start: 2023-11-10 | End: 2024-05-20 | Stop reason: ALTCHOICE

## 2023-11-10 RX ORDER — ACETAMINOPHEN 325 MG/1
650 TABLET ORAL EVERY 4 HOURS PRN
Qty: 30 TABLET | Refills: 0 | Status: SHIPPED | OUTPATIENT
Start: 2023-11-10

## 2023-11-10 RX ORDER — METOPROLOL SUCCINATE 50 MG/1
50 TABLET, EXTENDED RELEASE ORAL DAILY
Qty: 90 TABLET | Refills: 3 | Status: SHIPPED | OUTPATIENT
Start: 2023-11-10 | End: 2023-12-12 | Stop reason: SDUPTHER

## 2023-11-10 RX ORDER — FLUTICASONE PROPIONATE AND SALMETEROL 250; 50 UG/1; UG/1
1 POWDER RESPIRATORY (INHALATION)
Qty: 60 EACH | Refills: 11 | Status: SHIPPED | OUTPATIENT
Start: 2023-11-10 | End: 2024-11-09

## 2023-11-10 RX ORDER — NYSTATIN 100000 [USP'U]/G
POWDER TOPICAL 2 TIMES DAILY
Qty: 60 G | Refills: 3 | Status: ON HOLD | OUTPATIENT
Start: 2023-11-10 | End: 2024-05-20 | Stop reason: ALTCHOICE

## 2023-11-10 RX ORDER — FUROSEMIDE 10 MG/ML
20 INJECTION INTRAMUSCULAR; INTRAVENOUS DAILY
Qty: 90 ML | Refills: 3 | Status: SHIPPED | OUTPATIENT
Start: 2023-11-10 | End: 2023-11-30 | Stop reason: ALTCHOICE

## 2023-11-10 RX ORDER — METOPROLOL SUCCINATE 25 MG/1
25 TABLET, EXTENDED RELEASE ORAL DAILY
Qty: 90 TABLET | Refills: 2 | Status: SHIPPED | OUTPATIENT
Start: 2023-11-10 | End: 2023-11-30 | Stop reason: ALTCHOICE

## 2023-11-10 NOTE — PROGRESS NOTES
PROGRESS NOTE    Subjective   Chief complaint: Chris Chance is a 43 y.o. male who is an acute skilled patient being seen and evaluated for weakness    HPI:  11/6/23 Patient seen and examined at bedside.  Patient denies n/v/f/c.  Continues working in therapy.  No new complaints.    11/7/23 Patient has no acute complaints.  No new issues per nursing.  Continues working in therapy      Objective   Vital signs: 144/88, 98, 110.0, 95%    Physical Exam  Constitutional:       General: He is not in acute distress.  Eyes:      Extraocular Movements: Extraocular movements intact.   Pulmonary:      Effort: Pulmonary effort is normal.   Musculoskeletal:      Cervical back: Neck supple.   Neurological:      Mental Status: He is alert.   Psychiatric:         Mood and Affect: Mood normal.         Behavior: Behavior is cooperative.         Assessment/Plan   Problem List Items Addressed This Visit       Benign essential hypertension     Monitor          Weakness - Primary     Pt/ot           Medications, treatments, and labs reviewed  Continue medications and treatments as listed in [facility]    Scribe Attestation  Alisa GORDON Scribe   attest that this documentation has been prepared under the direction and in the presence of DODIE Vizcaino.    Provider Attestation - Scribe documentation  All medical record entries made by the Scribe were at my direction and personally dictated by me. I have reviewed the chart and agree that the record accurately reflects my personal performance of the history, physical exam, discussion and plan.    DODIE Vizcaino

## 2023-11-13 NOTE — PROGRESS NOTES
PROGRESS NOTE    Subjective   Chief complaint: Chris Chance is a 43 y.o. male who is an acute skilled patient being seen and evaluated for weakness    HPI:  11/6/23 Patient seen and examined at bedside.  Patient denies n/v/f/c.  Continues working in therapy.  No new complaints.    11/7/23 Patient has no acute complaints.  No new issues per nursing.  Continues working in therapy    11/8/23 Patient presents for f/u therapy and general medical care.  Patient seen and examined at beside.  Therapy has been working with the patient to improve strength, endurance, ADLs, and transfers d/t generalized weakness.  Patient has no acute concerns today.      Objective   Vital signs: 137/69, 98.9, 112, 20, 94%    Physical Exam  Constitutional:       General: He is not in acute distress.  Eyes:      Extraocular Movements: Extraocular movements intact.   Pulmonary:      Effort: Pulmonary effort is normal.   Musculoskeletal:      Cervical back: Neck supple.   Neurological:      Mental Status: He is alert.   Psychiatric:         Mood and Affect: Mood normal.         Behavior: Behavior is cooperative.         Assessment/Plan   Problem List Items Addressed This Visit       Benign essential hypertension     Monitor          Weakness - Primary     Pt/ot           Medications, treatments, and labs reviewed  Continue medications and treatments as listed in [facility]    Scribe Attestation  IAlisa Scribe   attest that this documentation has been prepared under the direction and in the presence of DODIE Vizcaino.    Provider Attestation - Scribe documentation  All medical record entries made by the Scribe were at my direction and personally dictated by me. I have reviewed the chart and agree that the record accurately reflects my personal performance of the history, physical exam, discussion and plan.    DODIE Vizcaino

## 2023-11-14 RX ORDER — CYCLOBENZAPRINE HCL 5 MG
5 TABLET ORAL 2 TIMES DAILY
Qty: 30 TABLET | Refills: 3 | Status: SHIPPED | OUTPATIENT
Start: 2023-11-14 | End: 2024-01-13

## 2023-11-14 RX ORDER — GABAPENTIN 100 MG/1
200 CAPSULE ORAL 3 TIMES DAILY
Qty: 180 CAPSULE | Refills: 2 | Status: ON HOLD | OUTPATIENT
Start: 2023-11-14 | End: 2024-05-20 | Stop reason: ALTCHOICE

## 2023-11-14 NOTE — PROGRESS NOTES
PROGRESS NOTE    Subjective   Chief complaint: Chris Chance is a 43 y.o. male who is an acute skilled patient being seen and evaluated for weakness    HPI:  10/11/23 Patient has no new complaints or concerns today.  Continues working towards goals in therapy.  Denies constitutional symptoms.    Objective   Vital signs: 162/68, 98.1, 110.0, 92%    Physical Exam  Constitutional:       General: He is not in acute distress.  Eyes:      Extraocular Movements: Extraocular movements intact.   Pulmonary:      Effort: Pulmonary effort is normal.   Musculoskeletal:      Cervical back: Neck supple.   Neurological:      Mental Status: He is alert.   Psychiatric:         Mood and Affect: Mood normal.         Behavior: Behavior is cooperative.         Assessment/Plan   Problem List Items Addressed This Visit       Asthma - Primary     Stable monitor          Benign essential hypertension     Monitor          Weakness     Pt/ot           Medications, treatments, and labs reviewed  Continue medications and treatments as listed in [facility]    Scribe Attestation  Alisa GORDON Scribe   attest that this documentation has been prepared under the direction and in the presence of DODIE Vizcaino.    Provider Attestation - Scribe documentation  All medical record entries made by the Scribe were at my direction and personally dictated by me. I have reviewed the chart and agree that the record accurately reflects my personal performance of the history, physical exam, discussion and plan.    DODIE Vizcaino

## 2023-11-15 ENCOUNTER — OFFICE VISIT (OUTPATIENT)
Dept: WOUND CARE | Facility: CLINIC | Age: 43
End: 2023-11-15
Payer: COMMERCIAL

## 2023-11-15 ENCOUNTER — LAB REQUISITION (OUTPATIENT)
Dept: LAB | Facility: HOSPITAL | Age: 43
End: 2023-11-15
Payer: COMMERCIAL

## 2023-11-15 DIAGNOSIS — I87.2 VENOUS INSUFFICIENCY (CHRONIC) (PERIPHERAL): ICD-10-CM

## 2023-11-15 PROCEDURE — 87070 CULTURE OTHR SPECIMN AEROBIC: CPT | Mod: OUT,PARLAB | Performed by: PODIATRIST

## 2023-11-15 PROCEDURE — 29581 APPL MULTLAYER CMPRN SYS LEG: CPT | Mod: 50

## 2023-11-15 PROCEDURE — 87075 CULTR BACTERIA EXCEPT BLOOD: CPT | Mod: OUT,PARLAB | Performed by: PODIATRIST

## 2023-11-17 ENCOUNTER — HOSPITAL ENCOUNTER (EMERGENCY)
Facility: HOSPITAL | Age: 43
Discharge: HOME | End: 2023-11-17
Attending: EMERGENCY MEDICINE
Payer: COMMERCIAL

## 2023-11-17 VITALS
HEART RATE: 103 BPM | DIASTOLIC BLOOD PRESSURE: 91 MMHG | SYSTOLIC BLOOD PRESSURE: 130 MMHG | WEIGHT: 315 LBS | TEMPERATURE: 98.8 F | RESPIRATION RATE: 20 BRPM | BODY MASS INDEX: 65.77 KG/M2 | OXYGEN SATURATION: 96 %

## 2023-11-17 DIAGNOSIS — Z51.89 VISIT FOR WOUND CARE: Primary | ICD-10-CM

## 2023-11-17 PROCEDURE — 99285 EMERGENCY DEPT VISIT HI MDM: CPT | Performed by: EMERGENCY MEDICINE

## 2023-11-17 PROCEDURE — 99283 EMERGENCY DEPT VISIT LOW MDM: CPT

## 2023-11-17 ASSESSMENT — COLUMBIA-SUICIDE SEVERITY RATING SCALE - C-SSRS
2. HAVE YOU ACTUALLY HAD ANY THOUGHTS OF KILLING YOURSELF?: NO
1. IN THE PAST MONTH, HAVE YOU WISHED YOU WERE DEAD OR WISHED YOU COULD GO TO SLEEP AND NOT WAKE UP?: NO
6. HAVE YOU EVER DONE ANYTHING, STARTED TO DO ANYTHING, OR PREPARED TO DO ANYTHING TO END YOUR LIFE?: NO

## 2023-11-17 ASSESSMENT — LIFESTYLE VARIABLES
REASON UNABLE TO ASSESS: NO
HAVE YOU EVER FELT YOU SHOULD CUT DOWN ON YOUR DRINKING: NO
EVER FELT BAD OR GUILTY ABOUT YOUR DRINKING: NO
EVER HAD A DRINK FIRST THING IN THE MORNING TO STEADY YOUR NERVES TO GET RID OF A HANGOVER: NO
HAVE PEOPLE ANNOYED YOU BY CRITICIZING YOUR DRINKING: NO

## 2023-11-17 NOTE — DISCHARGE INSTRUCTIONS
Call your wound care to see if they can see a little sooner if needed otherwise leave the dressings in place until Monday.

## 2023-11-17 NOTE — ED PROVIDER NOTES
HPI   Chief Complaint   Patient presents with    Skin Ulcer     Pt arrives [place] ems from home. States pressure ulcer on left leg that home care/ wound clinic has been wrapping 3x weekly for months that started bleeding uncontrollably today when wound nurse was @ house. Bleeding controlled upon arrival with pressure dressing. Not on anticoagulants.        Patient is a 43-year-old male with hypertension, hyperlipidemia, MICHAEL on CPAP, chronic venous insufficiency, chronic bilateral lower extremity wounds presents emergency department for left lower leg wound bleeding.  Patient has wound care 3 times a week.  His wound care visit 2 days ago was unremarkable.  The home health care nurse came by today to change his wounds and noted that there was blood seeping through the bandages.  She was concerned about the bleeding and sent the patient to the emergency department for further evaluation.  Patient has not had any fevers.  He does have pain at the wound, but not more than usual.  He is not on any blood thinners.      History provided by:  Patient, parent and medical records                      Rosaura Coma Scale Score: 15                  Patient History   Past Medical History:   Diagnosis Date    Abnormal levels of other serum enzymes     Elevated liver enzymes    Lateral epicondylitis, right elbow     Right tennis elbow    Other hypertrophic disorders of the skin     Skin tag    Personal history of other diseases of the nervous system and sense organs     History of serous otitis media    Personal history of other diseases of the respiratory system     History of bronchitis    Personal history of other specified conditions     History of abdominal pain    Plantar fascial fibromatosis     Plantar fasciitis    Unspecified asthma, uncomplicated     Asthmatic bronchitis     Past Surgical History:   Procedure Laterality Date    OTHER SURGICAL HISTORY  09/22/2015    History Of Prior Surgery     No family history on  file.  Social History     Tobacco Use    Smoking status: Never    Smokeless tobacco: Never   Substance Use Topics    Alcohol use: Never    Drug use: Never       Physical Exam   ED Triage Vitals [11/17/23 1238]   Temp Heart Rate Resp BP   37.1 °C (98.8 °F) 104 22 --      SpO2 Temp src Heart Rate Source Patient Position   96 % -- -- --      BP Location FiO2 (%)     -- --       Physical Exam  Vitals and nursing note reviewed.   Constitutional:       General: He is not in acute distress.     Appearance: He is well-developed. He is not ill-appearing or toxic-appearing.   HENT:      Head: Normocephalic and atraumatic.      Right Ear: External ear normal.      Left Ear: External ear normal.      Nose: Nose normal.      Mouth/Throat:      Mouth: Mucous membranes are moist.   Eyes:      General: No scleral icterus.     Conjunctiva/sclera: Conjunctivae normal.      Pupils: Pupils are equal, round, and reactive to light.   Cardiovascular:      Rate and Rhythm: Regular rhythm. Tachycardia present.      Heart sounds: No murmur heard.  Pulmonary:      Effort: Pulmonary effort is normal. No respiratory distress.      Breath sounds: Normal breath sounds.   Abdominal:      Palpations: Abdomen is soft.      Tenderness: There is no abdominal tenderness.   Musculoskeletal:         General: No swelling.      Cervical back: Neck supple. No rigidity.   Skin:     General: Skin is warm and dry.      Findings: Lesion present.      Comments: Large, circumferential skin ulcers on the left lower extremity.  Tender to palpation.  No purulence, erythema, or increased warmth.  Small amount of blood on the wounds, but no active bleeding.   Neurological:      General: No focal deficit present.      Mental Status: He is alert.   Psychiatric:         Mood and Affect: Mood normal.         ED Course & MDM   Diagnoses as of 11/17/23 1351   Visit for wound care       Medical Decision Making  Patient is a 43-year-old male who presents to the emergency  department for left lower extremity bleeding ulcers.  He is obviously tachycardic at 104, but otherwise he medically stable.  On arrival, he is awake alert, no acute distress, nontoxic.  Examination of the left lower extremity reveals circumferential, superficial skin ulcers.  No signs of infection such as increased warmth, erythema, purulence, malodor.  There is a small amount of blood throughout the ulcers, but no active bleeding.  No seepage of blood through his bandages.  Given that there were no signs of infection and there is no active bleeding, patient's wounds are dressed and new bandages.  He is instructed follow-up with wound care as scheduled.  Although he is slightly tachycardic, on chart review, he has been tachycardic in the low 100s at outpatient appointments as well as at discharge from his inpatient stays.  He is likely at his baseline heart rate.  Home care and return instructions discussed. Patient expressed understanding and agreement. Patient discharged in stable condition.    Ray Fernandes DO, PGY-3  Emergency Medicine Resident        Procedure  Procedures     Ray Fernandes DO  Resident  11/17/23 1123

## 2023-11-29 ENCOUNTER — OFFICE VISIT (OUTPATIENT)
Dept: WOUND CARE | Facility: CLINIC | Age: 43
End: 2023-11-29
Payer: COMMERCIAL

## 2023-11-29 PROCEDURE — 29581 APPL MULTLAYER CMPRN SYS LEG: CPT | Mod: 50

## 2023-11-30 ENCOUNTER — OFFICE VISIT (OUTPATIENT)
Dept: PRIMARY CARE | Facility: CLINIC | Age: 43
End: 2023-11-30
Payer: COMMERCIAL

## 2023-11-30 VITALS
RESPIRATION RATE: 22 BRPM | OXYGEN SATURATION: 97 % | HEIGHT: 67 IN | WEIGHT: 315 LBS | BODY MASS INDEX: 49.44 KG/M2 | SYSTOLIC BLOOD PRESSURE: 104 MMHG | DIASTOLIC BLOOD PRESSURE: 62 MMHG | HEART RATE: 100 BPM

## 2023-11-30 DIAGNOSIS — L03.116 CELLULITIS OF LEFT LOWER LIMB: ICD-10-CM

## 2023-11-30 DIAGNOSIS — I10 BENIGN ESSENTIAL HYPERTENSION: ICD-10-CM

## 2023-11-30 DIAGNOSIS — E78.2 MIXED HYPERLIPIDEMIA: ICD-10-CM

## 2023-11-30 DIAGNOSIS — L03.115 CELLULITIS OF RIGHT LOWER LIMB: ICD-10-CM

## 2023-11-30 DIAGNOSIS — M79.89 LEG SWELLING: ICD-10-CM

## 2023-11-30 DIAGNOSIS — Z23 NEED FOR VACCINE FOR TD (TETANUS-DIPHTHERIA): ICD-10-CM

## 2023-11-30 DIAGNOSIS — E88.810 METABOLIC SYNDROME: ICD-10-CM

## 2023-11-30 DIAGNOSIS — E66.01 MORBID OBESITY (MULTI): ICD-10-CM

## 2023-11-30 DIAGNOSIS — R60.1 GENERALIZED EDEMA: Primary | ICD-10-CM

## 2023-11-30 PROBLEM — S81.802D UNSPECIFIED OPEN WOUND, LEFT LOWER LEG, SUBSEQUENT ENCOUNTER: Status: ACTIVE | Noted: 2023-10-29

## 2023-11-30 PROBLEM — Z22.340: Status: ACTIVE | Noted: 2023-10-16

## 2023-11-30 PROBLEM — E83.42 HYPOMAGNESEMIA: Status: ACTIVE | Noted: 2023-10-05

## 2023-11-30 PROBLEM — M77.11 LATERAL EPICONDYLITIS, RIGHT ELBOW: Status: ACTIVE | Noted: 2023-10-29

## 2023-11-30 PROBLEM — M72.2 PLANTAR FASCIAL FIBROMATOSIS: Status: ACTIVE | Noted: 2023-10-29

## 2023-11-30 PROBLEM — E87.1 HYPO-OSMOLALITY AND HYPONATREMIA: Status: ACTIVE | Noted: 2023-10-05

## 2023-11-30 PROBLEM — Z28.311 PARTIALLY VACCINATED FOR COVID-19: Status: ACTIVE | Noted: 2023-10-05

## 2023-11-30 PROBLEM — J18.9 PNEUMONIA, UNSPECIFIED ORGANISM: Status: ACTIVE | Noted: 2023-10-05

## 2023-11-30 PROBLEM — I87.2 VENOUS INSUFFICIENCY (CHRONIC) (PERIPHERAL): Status: ACTIVE | Noted: 2023-10-05

## 2023-11-30 PROBLEM — I47.11: Status: ACTIVE | Noted: 2023-10-05

## 2023-11-30 PROBLEM — D64.9 ANEMIA, UNSPECIFIED: Status: ACTIVE | Noted: 2023-10-05

## 2023-11-30 PROBLEM — S81.801D UNSPECIFIED OPEN WOUND, RIGHT LOWER LEG, SUBSEQUENT ENCOUNTER: Status: ACTIVE | Noted: 2023-10-29

## 2023-11-30 PROCEDURE — 99214 OFFICE O/P EST MOD 30 MIN: CPT | Performed by: INTERNAL MEDICINE

## 2023-11-30 PROCEDURE — 3078F DIAST BP <80 MM HG: CPT | Performed by: INTERNAL MEDICINE

## 2023-11-30 PROCEDURE — 1036F TOBACCO NON-USER: CPT | Performed by: INTERNAL MEDICINE

## 2023-11-30 PROCEDURE — 3074F SYST BP LT 130 MM HG: CPT | Performed by: INTERNAL MEDICINE

## 2023-11-30 RX ORDER — TIRZEPATIDE 2.5 MG/.5ML
2.5 INJECTION, SOLUTION SUBCUTANEOUS
Qty: 2 ML | Refills: 3 | Status: ON HOLD | OUTPATIENT
Start: 2023-11-30 | End: 2024-04-15 | Stop reason: ALTCHOICE

## 2023-11-30 RX ORDER — LISINOPRIL AND HYDROCHLOROTHIAZIDE 12.5; 2 MG/1; MG/1
TABLET ORAL
Status: ON HOLD | COMMUNITY
Start: 2023-10-21 | End: 2024-05-20 | Stop reason: ALTCHOICE

## 2023-11-30 RX ORDER — FUROSEMIDE 20 MG/1
20 TABLET ORAL DAILY
Qty: 90 TABLET | Refills: 3 | Status: SHIPPED | OUTPATIENT
Start: 2023-11-30 | End: 2024-11-29

## 2023-11-30 RX ORDER — CIPROFLOXACIN 500 MG/1
500 TABLET ORAL 2 TIMES DAILY
COMMUNITY
Start: 2023-11-28 | End: 2023-12-05

## 2023-11-30 RX ORDER — NYSTATIN AND TRIAMCINOLONE ACETONIDE 100000; 1 [USP'U]/G; MG/G
CREAM TOPICAL 2 TIMES DAILY
Status: ON HOLD | COMMUNITY
Start: 2023-11-10 | End: 2024-05-20 | Stop reason: ALTCHOICE

## 2023-11-30 ASSESSMENT — PATIENT HEALTH QUESTIONNAIRE - PHQ9
2. FEELING DOWN, DEPRESSED OR HOPELESS: NOT AT ALL
1. LITTLE INTEREST OR PLEASURE IN DOING THINGS: NOT AT ALL
SUM OF ALL RESPONSES TO PHQ9 QUESTIONS 1 & 2: 0

## 2023-11-30 NOTE — PROGRESS NOTES
"Subjective   Chris Chance is a 43 y.o. male who presents for Follow-up (Recent nursing home stay, cellulitis,sepsis. ).  Patient presents for follow-up of wounds.  Patient is following with podiatry at wound center for his lower extremity wounds.  He has been discharged from the nursing facility.  He is staying with his mother and receiving home health care 3 times a week for wound care and dressing changes.  He is on his second round of Cipro from podiatry.  He has significant edema and weight gain.        Objective     /62 (BP Location: Left arm, Patient Position: Sitting, BP Cuff Size: Large adult)   Pulse 100   Resp 22   Ht 1.702 m (5' 7\")   Wt (!) 180 kg (396 lb)   SpO2 97%   BMI 62.02 kg/m²      Physical Exam  Constitutional:       Appearance: He is obese. He is ill-appearing.   HENT:      Head: Normocephalic and atraumatic.      Nose: Nose normal.      Mouth/Throat:      Mouth: Mucous membranes are moist.      Pharynx: Oropharynx is clear.   Eyes:      Extraocular Movements: Extraocular movements intact.      Pupils: Pupils are equal, round, and reactive to light.   Cardiovascular:      Rate and Rhythm: Normal rate and regular rhythm.   Pulmonary:      Effort: No respiratory distress.      Breath sounds: Normal breath sounds. No wheezing, rhonchi or rales.   Abdominal:      General: Bowel sounds are normal. There is no distension.      Palpations: Abdomen is soft.      Tenderness: There is no abdominal tenderness. There is no guarding.   Musculoskeletal:      Right lower leg: No edema.      Left lower leg: No edema.   Skin:     General: Skin is warm and dry.   Neurological:      Mental Status: He is alert and oriented to person, place, and time. Mental status is at baseline.   Psychiatric:         Mood and Affect: Mood normal.         Behavior: Behavior normal.         Assessment/Plan   Problem List Items Addressed This Visit       Benign essential hypertension     Continue current " medications         Hyperlipidemia     Continue current medications         Leg swelling     Lasix         Morbid obesity (CMS/HCC)     Mounjaro         Cellulitis of left lower limb     Following with podiatry at wound center  Encouraged to finish [facility]   Home health care for wound care           Cellulitis of right lower limb     Following with podiatry at wound center  Encouraged to finish [facility] health care for wound care            Other Visit Diagnoses       Generalized edema    -  Primary    Relevant Medications    furosemide (Lasix) 20 mg tablet    Need for vaccine for Td (tetanus-diphtheria)        Metabolic syndrome        Relevant Medications    tirzepatide (Mounjaro) 2.5 mg/0.5 mL pen injector          Continue medications as previously prescribed  Start Lasix  Start Mounjaro  Maintain follow-up with podiatry and wound clinic  Return in 3 months for a physical       Erik Avila DO

## 2023-12-01 ENCOUNTER — APPOINTMENT (OUTPATIENT)
Dept: PAIN MEDICINE | Facility: CLINIC | Age: 43
End: 2023-12-01
Payer: COMMERCIAL

## 2023-12-04 LAB
BACTERIA SPEC CULT: ABNORMAL
BACTERIA SPEC CULT: ABNORMAL
GRAM STN SPEC: ABNORMAL
GRAM STN SPEC: ABNORMAL

## 2023-12-04 NOTE — DISCHARGE SUMMARY
Discharge Diagnosis  Edema of extremities    Issues Requiring Follow-Up  Edema    Discharge Meds     Your medication list        START taking these medications        Instructions Last Dose Given Next Dose Due   acetaminophen 325 mg tablet  Commonly known as: Tylenol      Take 2 tablets (650 mg) by mouth every 4 hours if needed for fever (temp greater than 38.0 C) (pain).       fluticasone furoate-vilanteroL 200-25 mcg/dose inhaler  Commonly known as: Breo Ellipta      Inhale 1 puff once daily. Do not start before October 3, 2023.       lactobacillus acidophilus capsule      Take 1 capsule by mouth once daily.       lactobacillus acidophilus tablet tablet  Start taking on: October 6, 2023      Take 1 tablet by mouth once daily. Do not start before October 6, 2023.       metoprolol succinate XL 25 mg 24 hr tablet  Commonly known as: Toprol-XL      Take 1 tablet (25 mg) by mouth once daily. Do not crush or chew. Do not start before October 3, 2023.       metoprolol succinate XL 50 mg 24 hr tablet  Commonly known as: Toprol-XL      Take 1 tablet (50 mg) by mouth once daily. Do not crush or chew. Do not start before October 4, 2023.       nystatin 100,000 unit/gram powder  Commonly known as: Mycostatin      Apply topically 2 times a day.       oxyCODONE 5 mg immediate release tablet  Commonly known as: Roxicodone      Take 1 tablet (5 mg) by mouth every 4 hours if needed for moderate pain (4 - 6).       piperacillin-tazobactam 200 mg/mL injection  Commonly known as: Zosyn      Infuse 15 mL (3.375 g) over 30 minutes into a venous catheter every 6 hours for 7 days.       polyethylene glycol 17 gram packet  Commonly known as: Glycolax, Miralax  Start taking on: October 6, 2023      Take 17 g by mouth once daily. Do not start before October 6, 2023.       simethicone 80 mg chewable tablet  Commonly known as: Mylicon      Chew 1 tablet (80 mg) 3 times a day as needed (gas).       vancomycin 125 mg capsule  Commonly known as:  Vancocin      Take 1 capsule (125 mg) by mouth 4 times a day.       vancomycin 125 mg capsule  Commonly known as: Vancocin      Take 1 capsule (125 mg) by mouth 4 times a day.              CHANGE how you take these medications        Instructions Last Dose Given Next Dose Due   albuterol 90 mcg/actuation inhaler  What changed:   how much to take  when to take this  reasons to take this      Inhale 2 puffs every 6 hours if needed for wheezing or shortness of breath.              STOP taking these medications      colchicine (gout) 0.6 mg tablet        lisinopriL-hydrochlorothiazide 20-12.5 mg tablet        Symbicort 160-4.5 mcg/actuation inhaler  Generic drug: budesonide-formoteroL                  Where to Get Your Medications        These medications were sent to [id number] - [place], [place] - [address]  [address] [address]      Phone: [phone]   acetaminophen 325 mg tablet  simethicone 80 mg chewable tablet       You can get these medications from any pharmacy    Bring a paper prescription for each of these medications  oxyCODONE 5 mg immediate release tablet       These medications are not ready yet because we are checking if your insurance will help you pay for them    We will let you know when these medications are ready. If you don't hear back within 3 business days, please contact us.  albuterol 90 mcg/actuation inhaler       Information about where to get these medications is not yet available    Ask your nurse or doctor about these medications  fluticasone furoate-vilanteroL 200-25 mcg/dose inhaler  lactobacillus acidophilus capsule  lactobacillus acidophilus tablet tablet  metoprolol succinate XL 25 mg 24 hr tablet  metoprolol succinate XL 50 mg 24 hr tablet  nystatin 100,000 unit/gram powder  piperacillin-tazobactam 200 mg/mL injection  polyethylene glycol 17 gram packet  vancomycin 125 mg capsule  vancomycin 125 mg capsule         Test Results Pending At Discharge  Pending Labs        No current pending labs.            Hospital Course   TYRON WOOTEN is a 42 year old Male with a past medical history of hypertension, hyperlipidemia,  gout, and MICHAEL who presented today with bilateral lower extremity pain.      A/P:     1. BLE Cellulitis  Sepsis  Multifocal pneumonia   Hyponatremia   Hypomagnesemia  Elevated alk phops     admit to tele  INPT  appreciate podiatry recommendations -- OR for debridement 9/29  Continue Vanco and Zosyn   Wound culture   CBC, CMP, magnesium in a.m.   Hold home blood pressure medications   Give 1 L IV fluid bolus then 100 cc/h   Repeat lactate pending   Morphine and oxycodone as needed        2.  Shortness of breath on exertion   Anemia      Transfuse 1 unit pRBCs 9/29  Guaiac stool   Add BNP   CBC in a.m.         3.  Tachycardia     Consult Cardiology, appreciate recs  CTA chest negative for PE  Increase metoprolol     4.  Chronic conditions: Hypertension, hyperlipidemia, gout, MICHAEL      Continue home medications as listed above holding home ACE/hydrochlorothiazide due to sepsis      5.  DVT prophylaxis      Heparin     Dispo: anticipate SNF for IV antibiotics to complete Zosyn x2 weeks overall and wound care, medically ready    Pertinent Physical Exam At Time of Discharge  Physical Exam  Constitutional: Well developed, awake/alert/oriented  x3, no distress, alert and cooperative   Eyes: clear sclera   ENMT: mucous membranes moist, no apparent injury,  no lesions seen   Head/Neck: Neck supple, no apparent injury   Respiratory/Thorax: Patent airways, CTAB, normal  breath sounds with good chest expansion, thorax symmetric   Cardiovascular: Regular rate and rhythm, no murmurs,  2+ equal pulses of the extremities, normal S 1and S 2   Gastrointestinal: distended, firm, non-tender, no  rebound tenderness or guarding, no masses palpable, no organomegaly, +BS   Musculoskeletal: ROM limited ble, no joint swelling,  normal strength   Extremities: normal extremities, no cyanosis,  see  skin   Neurological: alert and oriented, intact motor response,  normal strength   Lymphatic: No significant lymphadenopathy   Psychological: Appropriate mood and behavior   Skin: Bilateral lower extremities erythematous, extremely  tender, warm, edematous, with missing epidermal layer with moist appearance     Outpatient Follow-Up  Future Appointments   Date Time Provider Department Center   12/11/2023  2:30 PM Sravan Merchant MD [facility] [place]   2/27/2024  1:45 PM Erik Avila DO [id number] [place]         Erik Avila DO

## 2023-12-06 ENCOUNTER — OFFICE VISIT (OUTPATIENT)
Dept: WOUND CARE | Facility: CLINIC | Age: 43
End: 2023-12-06
Payer: COMMERCIAL

## 2023-12-06 PROCEDURE — 29581 APPL MULTLAYER CMPRN SYS LEG: CPT | Mod: 50

## 2023-12-12 DIAGNOSIS — T14.8XXA MUSCLE STRAIN: ICD-10-CM

## 2023-12-12 DIAGNOSIS — M54.50 LOW BACK PAIN, UNSPECIFIED BACK PAIN LATERALITY, UNSPECIFIED CHRONICITY, UNSPECIFIED WHETHER SCIATICA PRESENT: ICD-10-CM

## 2023-12-12 DIAGNOSIS — E66.09 OBESITY DUE TO EXCESS CALORIES WITH SERIOUS COMORBIDITY, UNSPECIFIED CLASSIFICATION: ICD-10-CM

## 2023-12-12 DIAGNOSIS — S29.019S: ICD-10-CM

## 2023-12-12 DIAGNOSIS — M54.16 ACUTE LUMBAR RADICULOPATHY: ICD-10-CM

## 2023-12-12 RX ORDER — METOPROLOL SUCCINATE 50 MG/1
50 TABLET, EXTENDED RELEASE ORAL DAILY
Qty: 90 TABLET | Refills: 3 | Status: SHIPPED | OUTPATIENT
Start: 2023-12-12

## 2023-12-12 RX ORDER — IBUPROFEN 800 MG/1
800 TABLET ORAL 3 TIMES DAILY PRN
Qty: 180 TABLET | Refills: 3 | Status: SHIPPED | OUTPATIENT
Start: 2023-12-12 | End: 2024-12-11

## 2023-12-13 ENCOUNTER — OFFICE VISIT (OUTPATIENT)
Dept: WOUND CARE | Facility: CLINIC | Age: 43
End: 2023-12-13
Payer: COMMERCIAL

## 2023-12-13 PROCEDURE — 29581 APPL MULTLAYER CMPRN SYS LEG: CPT | Mod: 50

## 2023-12-18 DIAGNOSIS — E66.09 OBESITY DUE TO EXCESS CALORIES WITH SERIOUS COMORBIDITY, UNSPECIFIED CLASSIFICATION: ICD-10-CM

## 2023-12-18 DIAGNOSIS — I10 BENIGN ESSENTIAL HYPERTENSION: ICD-10-CM

## 2023-12-18 RX ORDER — METOPROLOL SUCCINATE 25 MG/1
25 TABLET, EXTENDED RELEASE ORAL DAILY
Qty: 30 TABLET | Refills: 5 | Status: SHIPPED | OUTPATIENT
Start: 2023-12-18 | End: 2024-06-15

## 2023-12-20 ENCOUNTER — OFFICE VISIT (OUTPATIENT)
Dept: WOUND CARE | Facility: CLINIC | Age: 43
End: 2023-12-20
Payer: COMMERCIAL

## 2023-12-20 ENCOUNTER — LAB (OUTPATIENT)
Dept: LAB | Facility: LAB | Age: 43
End: 2023-12-20
Payer: COMMERCIAL

## 2023-12-20 DIAGNOSIS — L97.822 NON-PRESSURE CHRONIC ULCER OF OTHER PART OF LEFT LOWER LEG WITH FAT LAYER EXPOSED (MULTI): ICD-10-CM

## 2023-12-20 DIAGNOSIS — E66.01 MORBID (SEVERE) OBESITY DUE TO EXCESS CALORIES (MULTI): ICD-10-CM

## 2023-12-20 DIAGNOSIS — L97.812 NON-PRESSURE CHRONIC ULCER OF OTHER PART OF RIGHT LOWER LEG WITH FAT LAYER EXPOSED (MULTI): Primary | ICD-10-CM

## 2023-12-20 DIAGNOSIS — I87.2 VENOUS INSUFFICIENCY (CHRONIC) (PERIPHERAL): ICD-10-CM

## 2023-12-20 LAB
BASOPHILS # BLD AUTO: 0.04 X10*3/UL (ref 0–0.1)
BASOPHILS NFR BLD AUTO: 0.6 %
EOSINOPHIL # BLD AUTO: 0.23 X10*3/UL (ref 0–0.7)
EOSINOPHIL NFR BLD AUTO: 3.5 %
ERYTHROCYTE [DISTWIDTH] IN BLOOD BY AUTOMATED COUNT: 16.6 % (ref 11.5–14.5)
HCT VFR BLD AUTO: 26.3 % (ref 41–52)
HGB BLD-MCNC: 7.7 G/DL (ref 13.5–17.5)
IMM GRANULOCYTES # BLD AUTO: 0.1 X10*3/UL (ref 0–0.7)
IMM GRANULOCYTES NFR BLD AUTO: 1.5 % (ref 0–0.9)
LYMPHOCYTES # BLD AUTO: 1.35 X10*3/UL (ref 1.2–4.8)
LYMPHOCYTES NFR BLD AUTO: 20.5 %
MCH RBC QN AUTO: 29.2 PG (ref 26–34)
MCHC RBC AUTO-ENTMCNC: 29.3 G/DL (ref 32–36)
MCV RBC AUTO: 100 FL (ref 80–100)
MONOCYTES # BLD AUTO: 0.57 X10*3/UL (ref 0.1–1)
MONOCYTES NFR BLD AUTO: 8.7 %
NEUTROPHILS # BLD AUTO: 4.28 X10*3/UL (ref 1.2–7.7)
NEUTROPHILS NFR BLD AUTO: 65.2 %
NRBC BLD-RTO: 0 /100 WBCS (ref 0–0)
PLATELET # BLD AUTO: 292 X10*3/UL (ref 150–450)
RBC # BLD AUTO: 2.64 X10*6/UL (ref 4.5–5.9)
WBC # BLD AUTO: 6.6 X10*3/UL (ref 4.4–11.3)

## 2023-12-20 PROCEDURE — 85025 COMPLETE CBC W/AUTO DIFF WBC: CPT

## 2023-12-20 PROCEDURE — 36415 COLL VENOUS BLD VENIPUNCTURE: CPT

## 2023-12-20 PROCEDURE — 29581 APPL MULTLAYER CMPRN SYS LEG: CPT | Mod: LT

## 2023-12-20 PROCEDURE — 83036 HEMOGLOBIN GLYCOSYLATED A1C: CPT

## 2023-12-21 LAB
EST. AVERAGE GLUCOSE BLD GHB EST-MCNC: 91 MG/DL
HBA1C MFR BLD: 4.8 %

## 2023-12-22 PROBLEM — M25.571 ANKLE PAIN, RIGHT: Status: RESOLVED | Noted: 2023-07-18 | Resolved: 2023-12-22

## 2023-12-22 PROBLEM — L85.3 DRY SKIN DERMATITIS: Status: RESOLVED | Noted: 2023-07-18 | Resolved: 2023-12-22

## 2023-12-22 PROBLEM — L72.3 SEBACEOUS CYST: Status: RESOLVED | Noted: 2023-07-18 | Resolved: 2023-12-22

## 2023-12-22 PROBLEM — T14.8XXA MUSCLE STRAIN: Status: RESOLVED | Noted: 2023-07-18 | Resolved: 2023-12-22

## 2023-12-22 PROBLEM — M67.40 GANGLION CYST: Status: RESOLVED | Noted: 2023-07-18 | Resolved: 2023-12-22

## 2023-12-22 PROBLEM — Z28.311 PARTIALLY VACCINATED FOR COVID-19: Status: RESOLVED | Noted: 2023-10-05 | Resolved: 2023-12-22

## 2023-12-22 PROBLEM — M79.672 PAIN IN BOTH FEET: Status: RESOLVED | Noted: 2023-07-18 | Resolved: 2023-12-22

## 2023-12-22 PROBLEM — M79.671 RIGHT FOOT PAIN: Status: RESOLVED | Noted: 2023-07-18 | Resolved: 2023-12-22

## 2023-12-22 PROBLEM — R19.5 DARK STOOLS: Status: RESOLVED | Noted: 2023-07-18 | Resolved: 2023-12-22

## 2023-12-22 PROBLEM — S76.219A GROIN STRAIN: Status: RESOLVED | Noted: 2023-07-18 | Resolved: 2023-12-22

## 2023-12-22 PROBLEM — R74.8 ELEVATED LIVER ENZYMES: Status: RESOLVED | Noted: 2023-07-18 | Resolved: 2023-12-22

## 2023-12-22 PROBLEM — R70.0 ELEVATED SED RATE: Status: RESOLVED | Noted: 2023-07-18 | Resolved: 2023-12-22

## 2023-12-22 PROBLEM — M79.671 PAIN IN BOTH FEET: Status: RESOLVED | Noted: 2023-07-18 | Resolved: 2023-12-22

## 2023-12-22 PROBLEM — E66.9 OBESITY: Status: RESOLVED | Noted: 2023-07-18 | Resolved: 2023-12-22

## 2023-12-22 PROBLEM — E78.00 HYPERCHOLESTEROLEMIA: Status: RESOLVED | Noted: 2023-07-18 | Resolved: 2023-12-22

## 2023-12-22 PROBLEM — S29.019A ACUTE THORACIC MYOFASCIAL STRAIN: Status: RESOLVED | Noted: 2023-07-18 | Resolved: 2023-12-22

## 2023-12-22 PROBLEM — R35.89 POLYURIA: Status: RESOLVED | Noted: 2023-07-18 | Resolved: 2023-12-22

## 2023-12-22 NOTE — ASSESSMENT & PLAN NOTE
Following with podiatry at wound center  Encouraged to finish [facility] health care for wound care

## 2023-12-22 NOTE — ASSESSMENT & PLAN NOTE
Following with podiatry at wound center  Encouraged to finish [facility] health care for wound care

## 2023-12-27 ENCOUNTER — OFFICE VISIT (OUTPATIENT)
Dept: WOUND CARE | Facility: CLINIC | Age: 43
End: 2023-12-27
Payer: COMMERCIAL

## 2023-12-27 PROCEDURE — 29581 APPL MULTLAYER CMPRN SYS LEG: CPT | Mod: LT

## 2024-01-03 ENCOUNTER — OFFICE VISIT (OUTPATIENT)
Dept: WOUND CARE | Facility: CLINIC | Age: 44
End: 2024-01-03
Payer: COMMERCIAL

## 2024-01-10 ENCOUNTER — OFFICE VISIT (OUTPATIENT)
Dept: WOUND CARE | Facility: CLINIC | Age: 44
End: 2024-01-10
Payer: COMMERCIAL

## 2024-01-10 PROCEDURE — 29581 APPL MULTLAYER CMPRN SYS LEG: CPT | Mod: LT

## 2024-01-17 ENCOUNTER — OFFICE VISIT (OUTPATIENT)
Dept: WOUND CARE | Facility: CLINIC | Age: 44
End: 2024-01-17
Payer: COMMERCIAL

## 2024-01-17 PROCEDURE — 29581 APPL MULTLAYER CMPRN SYS LEG: CPT | Mod: LT

## 2024-01-24 ENCOUNTER — OFFICE VISIT (OUTPATIENT)
Dept: WOUND CARE | Facility: CLINIC | Age: 44
End: 2024-01-24
Payer: COMMERCIAL

## 2024-01-24 PROCEDURE — 29581 APPL MULTLAYER CMPRN SYS LEG: CPT | Mod: LT

## 2024-02-07 ENCOUNTER — OFFICE VISIT (OUTPATIENT)
Dept: WOUND CARE | Facility: CLINIC | Age: 44
End: 2024-02-07
Payer: COMMERCIAL

## 2024-02-07 PROCEDURE — 29581 APPL MULTLAYER CMPRN SYS LEG: CPT | Mod: LT

## 2024-02-14 ENCOUNTER — LAB REQUISITION (OUTPATIENT)
Dept: LAB | Facility: HOSPITAL | Age: 44
End: 2024-02-14
Payer: COMMERCIAL

## 2024-02-14 ENCOUNTER — OFFICE VISIT (OUTPATIENT)
Dept: WOUND CARE | Facility: CLINIC | Age: 44
End: 2024-02-14
Payer: COMMERCIAL

## 2024-02-14 DIAGNOSIS — E66.01 MORBID (SEVERE) OBESITY DUE TO EXCESS CALORIES (MULTI): ICD-10-CM

## 2024-02-14 DIAGNOSIS — I87.2 VENOUS INSUFFICIENCY (CHRONIC) (PERIPHERAL): ICD-10-CM

## 2024-02-14 DIAGNOSIS — L97.812 NON-PRESSURE CHRONIC ULCER OF OTHER PART OF RIGHT LOWER LEG WITH FAT LAYER EXPOSED (MULTI): ICD-10-CM

## 2024-02-14 DIAGNOSIS — L97.822 NON-PRESSURE CHRONIC ULCER OF OTHER PART OF LEFT LOWER LEG WITH FAT LAYER EXPOSED (MULTI): ICD-10-CM

## 2024-02-14 PROCEDURE — 87186 SC STD MICRODIL/AGAR DIL: CPT | Mod: 59,OUT,PARLAB | Performed by: PODIATRIST

## 2024-02-14 PROCEDURE — 29581 APPL MULTLAYER CMPRN SYS LEG: CPT | Mod: LT

## 2024-02-21 ENCOUNTER — LAB (OUTPATIENT)
Dept: LAB | Facility: LAB | Age: 44
End: 2024-02-21
Payer: COMMERCIAL

## 2024-02-21 ENCOUNTER — OFFICE VISIT (OUTPATIENT)
Dept: WOUND CARE | Facility: CLINIC | Age: 44
End: 2024-02-21
Payer: COMMERCIAL

## 2024-02-21 DIAGNOSIS — I10 BENIGN ESSENTIAL HYPERTENSION: ICD-10-CM

## 2024-02-21 DIAGNOSIS — Z00.00 ANNUAL PHYSICAL EXAM: ICD-10-CM

## 2024-02-21 LAB
25(OH)D3 SERPL-MCNC: 9 NG/ML (ref 30–100)
ALBUMIN SERPL BCP-MCNC: 3.6 G/DL (ref 3.4–5)
ALP SERPL-CCNC: 72 U/L (ref 33–120)
ALT SERPL W P-5'-P-CCNC: 10 U/L (ref 10–52)
ANION GAP SERPL CALC-SCNC: 14 MMOL/L (ref 10–20)
AST SERPL W P-5'-P-CCNC: 21 U/L (ref 9–39)
BILIRUB SERPL-MCNC: 0.4 MG/DL (ref 0–1.2)
BUN SERPL-MCNC: 7 MG/DL (ref 6–23)
CALCIUM SERPL-MCNC: 9.5 MG/DL (ref 8.6–10.3)
CHLORIDE SERPL-SCNC: 100 MMOL/L (ref 98–107)
CHOLEST SERPL-MCNC: 152 MG/DL (ref 0–199)
CHOLESTEROL/HDL RATIO: 4.8
CO2 SERPL-SCNC: 29 MMOL/L (ref 21–32)
CREAT SERPL-MCNC: 0.7 MG/DL (ref 0.5–1.3)
EGFRCR SERPLBLD CKD-EPI 2021: >90 ML/MIN/1.73M*2
ERYTHROCYTE [DISTWIDTH] IN BLOOD BY AUTOMATED COUNT: 15.7 % (ref 11.5–14.5)
GLUCOSE SERPL-MCNC: 84 MG/DL (ref 74–99)
HCT VFR BLD AUTO: 33.5 % (ref 41–52)
HDLC SERPL-MCNC: 32 MG/DL
HGB BLD-MCNC: 10.2 G/DL (ref 13.5–17.5)
LDLC SERPL CALC-MCNC: 87 MG/DL
MCH RBC QN AUTO: 29.8 PG (ref 26–34)
MCHC RBC AUTO-ENTMCNC: 30.4 G/DL (ref 32–36)
MCV RBC AUTO: 98 FL (ref 80–100)
NON HDL CHOLESTEROL: 120 MG/DL (ref 0–149)
NRBC BLD-RTO: 0 /100 WBCS (ref 0–0)
PLATELET # BLD AUTO: 484 X10*3/UL (ref 150–450)
POTASSIUM SERPL-SCNC: 4.5 MMOL/L (ref 3.5–5.3)
PROT SERPL-MCNC: 6.8 G/DL (ref 6.4–8.2)
RBC # BLD AUTO: 3.42 X10*6/UL (ref 4.5–5.9)
SODIUM SERPL-SCNC: 138 MMOL/L (ref 136–145)
TRIGL SERPL-MCNC: 167 MG/DL (ref 0–149)
TSH SERPL-ACNC: 4.53 MIU/L (ref 0.44–3.98)
VLDL: 33 MG/DL (ref 0–40)
WBC # BLD AUTO: 7.2 X10*3/UL (ref 4.4–11.3)

## 2024-02-21 PROCEDURE — 83036 HEMOGLOBIN GLYCOSYLATED A1C: CPT

## 2024-02-21 PROCEDURE — 85027 COMPLETE CBC AUTOMATED: CPT

## 2024-02-21 PROCEDURE — 29581 APPL MULTLAYER CMPRN SYS LEG: CPT | Mod: LT

## 2024-02-21 PROCEDURE — 80053 COMPREHEN METABOLIC PANEL: CPT

## 2024-02-21 PROCEDURE — 80061 LIPID PANEL: CPT

## 2024-02-21 PROCEDURE — 36415 COLL VENOUS BLD VENIPUNCTURE: CPT

## 2024-02-21 PROCEDURE — 84443 ASSAY THYROID STIM HORMONE: CPT

## 2024-02-21 PROCEDURE — 82306 VITAMIN D 25 HYDROXY: CPT

## 2024-02-22 LAB
EST. AVERAGE GLUCOSE BLD GHB EST-MCNC: 80 MG/DL
HBA1C MFR BLD: 4.4 %

## 2024-02-27 ENCOUNTER — OFFICE VISIT (OUTPATIENT)
Dept: PRIMARY CARE | Facility: CLINIC | Age: 44
End: 2024-02-27
Payer: COMMERCIAL

## 2024-02-27 VITALS
SYSTOLIC BLOOD PRESSURE: 108 MMHG | RESPIRATION RATE: 16 BRPM | HEIGHT: 67 IN | BODY MASS INDEX: 48.34 KG/M2 | DIASTOLIC BLOOD PRESSURE: 62 MMHG | WEIGHT: 308 LBS | HEART RATE: 61 BPM | OXYGEN SATURATION: 95 %

## 2024-02-27 DIAGNOSIS — I10 BENIGN ESSENTIAL HYPERTENSION: ICD-10-CM

## 2024-02-27 DIAGNOSIS — D64.9 ANEMIA, UNSPECIFIED TYPE: ICD-10-CM

## 2024-02-27 DIAGNOSIS — J45.20 MILD INTERMITTENT ASTHMA WITHOUT COMPLICATION (HHS-HCC): ICD-10-CM

## 2024-02-27 DIAGNOSIS — J06.9 UPPER RESPIRATORY INFECTION WITH COUGH AND CONGESTION: ICD-10-CM

## 2024-02-27 DIAGNOSIS — E66.01 MORBID OBESITY (MULTI): ICD-10-CM

## 2024-02-27 DIAGNOSIS — E55.9 VITAMIN D DEFICIENCY: ICD-10-CM

## 2024-02-27 DIAGNOSIS — R79.89 ABNORMAL TSH: Primary | ICD-10-CM

## 2024-02-27 DIAGNOSIS — S81.809S: ICD-10-CM

## 2024-02-27 DIAGNOSIS — E78.2 MIXED HYPERLIPIDEMIA: ICD-10-CM

## 2024-02-27 PROBLEM — S39.91XA INJURY OF GROIN: Status: ACTIVE | Noted: 2024-02-27

## 2024-02-27 PROBLEM — R10.84 ABDOMINAL DISCOMFORT, GENERALIZED: Status: RESOLVED | Noted: 2023-07-18 | Resolved: 2024-02-27

## 2024-02-27 PROBLEM — R26.2 UNABLE TO AMBULATE: Status: RESOLVED | Noted: 2023-10-20 | Resolved: 2024-02-27

## 2024-02-27 PROBLEM — S39.91XA INJURY OF GROIN: Status: RESOLVED | Noted: 2024-02-27 | Resolved: 2024-02-27

## 2024-02-27 PROBLEM — A41.9 SEPSIS (MULTI): Status: RESOLVED | Noted: 2023-10-10 | Resolved: 2024-02-27

## 2024-02-27 PROBLEM — R00.0 TACHYCARDIA: Status: RESOLVED | Noted: 2023-09-30 | Resolved: 2024-02-27

## 2024-02-27 PROBLEM — J11.1 INFLUENZA: Status: RESOLVED | Noted: 2023-07-18 | Resolved: 2024-02-27

## 2024-02-27 PROBLEM — E87.1 HYPO-OSMOLALITY AND HYPONATREMIA: Status: RESOLVED | Noted: 2023-10-05 | Resolved: 2024-02-27

## 2024-02-27 PROBLEM — Z86.39 HISTORY OF HYPERCHOLESTEROLEMIA: Status: ACTIVE | Noted: 2024-02-27

## 2024-02-27 PROBLEM — M77.11 LATERAL EPICONDYLITIS, RIGHT ELBOW: Status: RESOLVED | Noted: 2023-10-29 | Resolved: 2024-02-27

## 2024-02-27 PROBLEM — L91.8 SKIN TAG: Status: ACTIVE | Noted: 2024-02-27

## 2024-02-27 PROBLEM — R06.83 SNORING: Status: RESOLVED | Noted: 2023-07-18 | Resolved: 2024-02-27

## 2024-02-27 PROBLEM — H61.20 EXCESSIVE CERUMEN IN EAR CANAL: Status: ACTIVE | Noted: 2024-02-27

## 2024-02-27 PROBLEM — L91.8 SKIN TAG: Status: RESOLVED | Noted: 2024-02-27 | Resolved: 2024-02-27

## 2024-02-27 PROBLEM — R60.0 EDEMA OF BOTH LOWER EXTREMITIES: Status: ACTIVE | Noted: 2024-02-27

## 2024-02-27 PROBLEM — Z86.69 HISTORY OF OTITIS MEDIA: Status: ACTIVE | Noted: 2024-02-27

## 2024-02-27 PROBLEM — R73.9 HYPERGLYCEMIA: Status: RESOLVED | Noted: 2023-07-18 | Resolved: 2024-02-27

## 2024-02-27 PROBLEM — D72.829 LEUKOCYTOSIS: Status: ACTIVE | Noted: 2024-02-27

## 2024-02-27 PROBLEM — E83.42 HYPOMAGNESEMIA: Status: RESOLVED | Noted: 2023-10-05 | Resolved: 2024-02-27

## 2024-02-27 PROBLEM — J18.9 PNEUMONIA, UNSPECIFIED ORGANISM: Status: RESOLVED | Noted: 2023-10-05 | Resolved: 2024-02-27

## 2024-02-27 PROBLEM — D72.829 LEUKOCYTOSIS: Status: RESOLVED | Noted: 2024-02-27 | Resolved: 2024-02-27

## 2024-02-27 PROCEDURE — 1036F TOBACCO NON-USER: CPT | Performed by: INTERNAL MEDICINE

## 2024-02-27 PROCEDURE — 3078F DIAST BP <80 MM HG: CPT | Performed by: INTERNAL MEDICINE

## 2024-02-27 PROCEDURE — 3074F SYST BP LT 130 MM HG: CPT | Performed by: INTERNAL MEDICINE

## 2024-02-27 PROCEDURE — 99396 PREV VISIT EST AGE 40-64: CPT | Performed by: INTERNAL MEDICINE

## 2024-02-27 PROCEDURE — 99214 OFFICE O/P EST MOD 30 MIN: CPT | Performed by: INTERNAL MEDICINE

## 2024-02-27 RX ORDER — L. ACIDOPHILUS/L.BULGARICUS 1MM CELL
1 TABLET ORAL DAILY
Qty: 90 TABLET | Refills: 0 | Status: ON HOLD | OUTPATIENT
Start: 2024-02-27 | End: 2024-05-20 | Stop reason: ALTCHOICE

## 2024-02-27 RX ORDER — SULFAMETHOXAZOLE AND TRIMETHOPRIM 800; 160 MG/1; MG/1
1 TABLET ORAL 2 TIMES DAILY
Status: ON HOLD | COMMUNITY
Start: 2024-02-23 | End: 2024-05-20 | Stop reason: ALTCHOICE

## 2024-02-27 RX ORDER — VIT C/E/ZN/COPPR/LUTEIN/ZEAXAN 250MG-90MG
50 CAPSULE ORAL DAILY
Qty: 180 CAPSULE | Refills: 3 | Status: SHIPPED | OUTPATIENT
Start: 2024-02-27 | End: 2027-02-11

## 2024-02-27 ASSESSMENT — PAIN SCALES - GENERAL: PAINLEVEL: 4

## 2024-02-28 ENCOUNTER — APPOINTMENT (OUTPATIENT)
Dept: WOUND CARE | Facility: CLINIC | Age: 44
End: 2024-02-28
Payer: COMMERCIAL

## 2024-02-28 ENCOUNTER — OFFICE VISIT (OUTPATIENT)
Dept: WOUND CARE | Facility: CLINIC | Age: 44
End: 2024-02-28
Payer: COMMERCIAL

## 2024-02-28 ENCOUNTER — LAB REQUISITION (OUTPATIENT)
Dept: LAB | Facility: HOSPITAL | Age: 44
End: 2024-02-28
Payer: COMMERCIAL

## 2024-02-28 DIAGNOSIS — I87.2 VENOUS INSUFFICIENCY (CHRONIC) (PERIPHERAL): ICD-10-CM

## 2024-02-28 DIAGNOSIS — L97.812 NON-PRESSURE CHRONIC ULCER OF OTHER PART OF RIGHT LOWER LEG WITH FAT LAYER EXPOSED (MULTI): ICD-10-CM

## 2024-02-28 DIAGNOSIS — L97.822 NON-PRESSURE CHRONIC ULCER OF OTHER PART OF LEFT LOWER LEG WITH FAT LAYER EXPOSED (MULTI): ICD-10-CM

## 2024-02-28 DIAGNOSIS — E66.01 MORBID (SEVERE) OBESITY DUE TO EXCESS CALORIES (MULTI): ICD-10-CM

## 2024-02-28 PROCEDURE — 87186 SC STD MICRODIL/AGAR DIL: CPT | Mod: 59,OUT,PARLAB | Performed by: PODIATRIST

## 2024-02-28 PROCEDURE — 29581 APPL MULTLAYER CMPRN SYS LEG: CPT | Mod: LT

## 2024-02-28 NOTE — PROGRESS NOTES
"Subjective   Chris Chance is a 43 y.o. male who presents for Annual Exam.  Patient presents for his yearly physical. Mother present.  He is following with podiatry in the wound clinic.  He is making significant progress in healing.  He has lost nearly 100 pounds since his last visit.  He was diagnosed by mostly changing his diet.  He is currently on antibiotics for a wound infection.  He requests a refill of his probiotic.  Lab work reviewed with patient.  Vitamin D is low. Patient will start vitamin D therapy.  Thyroid labs are abnormal.  Agreed to repeat lab work.  No issues with bowel or bladder.  No issues with sleep.        Objective     /62 (BP Location: Right arm, Patient Position: Sitting, BP Cuff Size: Large adult)   Pulse 61   Resp 16   Ht 1.702 m (5' 7\")   Wt 140 kg (308 lb)   SpO2 95%   BMI 48.24 kg/m²      Physical Exam  Vitals reviewed.   Constitutional:       Appearance: Normal appearance.   HENT:      Head: Normocephalic and atraumatic.      Nose: Nose normal.      Mouth/Throat:      Mouth: Mucous membranes are moist.      Pharynx: Oropharynx is clear.   Eyes:      Extraocular Movements: Extraocular movements intact.      Pupils: Pupils are equal, round, and reactive to light.   Cardiovascular:      Rate and Rhythm: Normal rate and regular rhythm.   Pulmonary:      Effort: No respiratory distress.      Breath sounds: Normal breath sounds. No wheezing, rhonchi or rales.   Abdominal:      General: Bowel sounds are normal. There is no distension.      Palpations: Abdomen is soft.      Tenderness: There is no abdominal tenderness. There is no guarding.   Musculoskeletal:      Right lower leg: No edema.      Left lower leg: No edema.   Skin:     General: Skin is warm and dry.   Neurological:      Mental Status: He is alert and oriented to person, place, and time. Mental status is at baseline.   Psychiatric:         Mood and Affect: Mood normal.         Behavior: Behavior normal. "         Assessment/Plan   Problem List Items Addressed This Visit       Asthma     Continue current medications         Benign essential hypertension     Well-controlled  Continue current medications         Hyperlipidemia     Continue current medications         Morbid obesity (CMS/[facility])     Encouraged to continue efforts at weight loss         Upper respiratory infection with cough and congestion    Relevant Medications    lactobacillus acidophilus tablet tablet    Multiple open wounds of lower extremity, complicated, sequela     Maintain follow-up with podiatry at wound clinic  Currently on Bactrim  Resume probiotic         Anemia, unspecified     Significantly improved  Continue to monitor          Other Visit Diagnoses       Abnormal TSH    -  Primary    Relevant Orders    Thyroxine, Free    Thyroid Stimulating Hormone    Vitamin D deficiency        Relevant Medications    cholecalciferol (Vitamin D-3) 25 MCG (1000 UT) capsule          Follow-up in 3 months to repeat thyroid labs       Erik Avila DO

## 2024-02-28 NOTE — H&P (VIEW-ONLY)
"Subjective   Chris Chance is a 43 y.o. male who presents for Annual Exam.  Patient presents for his yearly physical. Mother present.  He is following with podiatry in the wound clinic.  He is making significant progress in healing.  He has lost nearly 100 pounds since his last visit.  He was diagnosed by mostly changing his diet.  He is currently on antibiotics for a wound infection.  He requests a refill of his probiotic.  Lab work reviewed with patient.  Vitamin D is low. Patient will start vitamin D therapy.  Thyroid labs are abnormal.  Agreed to repeat lab work.  No issues with bowel or bladder.  No issues with sleep.        Objective     /62 (BP Location: Right arm, Patient Position: Sitting, BP Cuff Size: Large adult)   Pulse 61   Resp 16   Ht 1.702 m (5' 7\")   Wt 140 kg (308 lb)   SpO2 95%   BMI 48.24 kg/m²      Physical Exam  Vitals reviewed.   Constitutional:       Appearance: Normal appearance.   HENT:      Head: Normocephalic and atraumatic.      Nose: Nose normal.      Mouth/Throat:      Mouth: Mucous membranes are moist.      Pharynx: Oropharynx is clear.   Eyes:      Extraocular Movements: Extraocular movements intact.      Pupils: Pupils are equal, round, and reactive to light.   Cardiovascular:      Rate and Rhythm: Normal rate and regular rhythm.   Pulmonary:      Effort: No respiratory distress.      Breath sounds: Normal breath sounds. No wheezing, rhonchi or rales.   Abdominal:      General: Bowel sounds are normal. There is no distension.      Palpations: Abdomen is soft.      Tenderness: There is no abdominal tenderness. There is no guarding.   Musculoskeletal:      Right lower leg: No edema.      Left lower leg: No edema.   Skin:     General: Skin is warm and dry.   Neurological:      Mental Status: He is alert and oriented to person, place, and time. Mental status is at baseline.   Psychiatric:         Mood and Affect: Mood normal.         Behavior: Behavior normal. "         Assessment/Plan   Problem List Items Addressed This Visit       Asthma     Continue current medications         Benign essential hypertension     Well-controlled  Continue current medications         Hyperlipidemia     Continue current medications         Morbid obesity (CMS/[facility])     Encouraged to continue efforts at weight loss         Upper respiratory infection with cough and congestion    Relevant Medications    lactobacillus acidophilus tablet tablet    Multiple open wounds of lower extremity, complicated, sequela     Maintain follow-up with podiatry at wound clinic  Currently on Bactrim  Resume probiotic         Anemia, unspecified     Significantly improved  Continue to monitor          Other Visit Diagnoses       Abnormal TSH    -  Primary    Relevant Orders    Thyroxine, Free    Thyroid Stimulating Hormone    Vitamin D deficiency        Relevant Medications    cholecalciferol (Vitamin D-3) 25 MCG (1000 UT) capsule          Follow-up in 3 months to repeat thyroid labs       Erik Avila DO

## 2024-03-06 ENCOUNTER — OFFICE VISIT (OUTPATIENT)
Dept: WOUND CARE | Facility: CLINIC | Age: 44
End: 2024-03-06
Payer: COMMERCIAL

## 2024-03-06 PROCEDURE — 29581 APPL MULTLAYER CMPRN SYS LEG: CPT | Mod: LT

## 2024-03-13 ENCOUNTER — APPOINTMENT (OUTPATIENT)
Dept: WOUND CARE | Facility: CLINIC | Age: 44
End: 2024-03-13
Payer: COMMERCIAL

## 2024-03-19 LAB
BACTERIA SPEC CULT: ABNORMAL
GRAM STN SPEC: ABNORMAL
GRAM STN SPEC: ABNORMAL
LABORATORY COMMENT REPORT: ABNORMAL

## 2024-03-20 ENCOUNTER — LAB REQUISITION (OUTPATIENT)
Dept: LAB | Facility: HOSPITAL | Age: 44
End: 2024-03-20
Payer: COMMERCIAL

## 2024-03-20 ENCOUNTER — OFFICE VISIT (OUTPATIENT)
Dept: WOUND CARE | Facility: CLINIC | Age: 44
End: 2024-03-20
Payer: COMMERCIAL

## 2024-03-20 DIAGNOSIS — L97.822 NON-PRESSURE CHRONIC ULCER OF OTHER PART OF LEFT LOWER LEG WITH FAT LAYER EXPOSED (MULTI): ICD-10-CM

## 2024-03-20 DIAGNOSIS — L97.812 NON-PRESSURE CHRONIC ULCER OF OTHER PART OF RIGHT LOWER LEG WITH FAT LAYER EXPOSED (MULTI): ICD-10-CM

## 2024-03-20 DIAGNOSIS — E66.01 MORBID (SEVERE) OBESITY DUE TO EXCESS CALORIES (MULTI): ICD-10-CM

## 2024-03-20 DIAGNOSIS — I87.2 VENOUS INSUFFICIENCY (CHRONIC) (PERIPHERAL): ICD-10-CM

## 2024-03-20 PROCEDURE — 29581 APPL MULTLAYER CMPRN SYS LEG: CPT | Mod: LT

## 2024-03-20 PROCEDURE — 87186 SC STD MICRODIL/AGAR DIL: CPT | Mod: 59,OUT,PARLAB | Performed by: PODIATRIST

## 2024-03-25 ENCOUNTER — HOSPITAL ENCOUNTER (OUTPATIENT)
Facility: HOSPITAL | Age: 44
Setting detail: OUTPATIENT SURGERY
Discharge: HOME | End: 2024-03-25
Attending: PODIATRIST | Admitting: PODIATRIST
Payer: COMMERCIAL

## 2024-03-25 VITALS
WEIGHT: 308.64 LBS | DIASTOLIC BLOOD PRESSURE: 80 MMHG | OXYGEN SATURATION: 99 % | TEMPERATURE: 97.7 F | SYSTOLIC BLOOD PRESSURE: 130 MMHG | BODY MASS INDEX: 48.44 KG/M2 | HEIGHT: 67 IN | HEART RATE: 82 BPM | RESPIRATION RATE: 16 BRPM

## 2024-03-25 DIAGNOSIS — S81.802D UNSPECIFIED OPEN WOUND, LEFT LOWER LEG, SUBSEQUENT ENCOUNTER: ICD-10-CM

## 2024-03-25 DIAGNOSIS — L97.222: Primary | ICD-10-CM

## 2024-03-25 PROCEDURE — 7100000010 HC PHASE TWO TIME - EACH INCREMENTAL 1 MINUTE: Performed by: PODIATRIST

## 2024-03-25 PROCEDURE — 7100000009 HC PHASE TWO TIME - INITIAL BASE CHARGE: Performed by: PODIATRIST

## 2024-03-25 PROCEDURE — 2780000003 HC OR 278 NO HCPCS: Performed by: PODIATRIST

## 2024-03-25 PROCEDURE — 2500000004 HC RX 250 GENERAL PHARMACY W/ HCPCS (ALT 636 FOR OP/ED): Performed by: PODIATRIST

## 2024-03-25 PROCEDURE — 3600000007 HC OR TIME - EACH INCREMENTAL 1 MINUTE - PROCEDURE LEVEL TWO: Performed by: PODIATRIST

## 2024-03-25 PROCEDURE — 3600000002 HC OR TIME - INITIAL BASE CHARGE - PROCEDURE LEVEL TWO: Performed by: PODIATRIST

## 2024-03-25 PROCEDURE — C1763 CONN TISS, NON-HUMAN: HCPCS | Performed by: PODIATRIST

## 2024-03-25 PROCEDURE — A4217 STERILE WATER/SALINE, 500 ML: HCPCS | Performed by: PODIATRIST

## 2024-03-25 PROCEDURE — 2720000007 HC OR 272 NO HCPCS: Performed by: PODIATRIST

## 2024-03-25 DEVICE — MYRIAD MATRIX IS INTENDED FOR APPLICATIONS IN PLASTIC AND RECONSTRUCTIVE SURGERY OR TO COVER, PROTECT AND PROVIDE A MOIST WOUND ENVIRONMENT. THE DEVICE MAY BE FIXED, VIA SUTURES, STAPLES OR TACKS TO SURROUND THE TISSUE IF DESIRED.
Type: IMPLANTABLE DEVICE | Site: LEG | Status: FUNCTIONAL
Brand: MYRIAD MATRIX SOFT TISSUE BIOSCAFFOLD

## 2024-03-25 RX ORDER — SODIUM CHLORIDE 0.9 G/100ML
IRRIGANT IRRIGATION AS NEEDED
Status: DISCONTINUED | OUTPATIENT
Start: 2024-03-25 | End: 2024-03-25 | Stop reason: HOSPADM

## 2024-03-25 ASSESSMENT — PAIN SCALES - GENERAL: PAINLEVEL_OUTOF10: 0 - NO PAIN

## 2024-03-25 ASSESSMENT — PAIN - FUNCTIONAL ASSESSMENT
PAIN_FUNCTIONAL_ASSESSMENT: 0-10
PAIN_FUNCTIONAL_ASSESSMENT: 0-10

## 2024-03-25 NOTE — OP NOTE
LEFT CALF WOUND DEBRIDEMENT WITH SKIN GRAFT (L) Operative Note     Date: 3/25/2024  OR Location: [facility] OR    Name: Chris Chance, : 1980, Age: 43 y.o., MRN: [id number], Sex: male    Diagnosis  Left calf Ulcer, CVU  Pre-op Diagnosis     * Ulcer of toe of left foot, with necrosis of muscle (CMS/HCC) [L97.523] Post-op Diagnosis     * Ulcer of toe of left foot, with necrosis of muscle (CMS/HCC) [L97.523]     Procedures  LEFT CALF WOUND DEBRIDEMENT WITH SKIN GRAFT  89291 - TN SPLIT AGRFT F/S/N/H/F/G/M/D GT 1ST 100 CM/</1 %      Surgeons      * Libra Smalls - Primary    Resident/Fellow/Other Assistant:  Surgeon(s) and Role:  Sukhwinder Kay PGY3    Procedure Summary  Anesthesia: Local  ASA: ASA status not filed in the log.  Anesthesia Staff: Anesthesiologist: Edwin Melgar MD  Estimated Blood Loss: 0mL  Intra-op Medications:   Administrations occurring from 1300 to 1415 on 24:   Medication Name Total Dose   sodium chloride 0.9 % irrigation solution 200 mL         Intraprocedure I/O Totals       None           Specimen: No specimens collected     Staff:   Circulator: Ban Do RN; Eleonora Mejia RN  Scrub Person: Teri Adrian         Drains and/or Catheters: * None in log *    Tourniquet Times:         Implants: 10x20 myriad graft    Findings: CVU left calf, morbid obesity    Indications: Chrsi Chance is an 43 y.o. male who is having surgery for L97.523.     The patient was seen in the preoperative area. The risks, benefits, complications, treatment options, non-operative alternatives, expected recovery and outcomes were discussed with the patient. The possibilities of reaction to medication, pulmonary aspiration, injury to surrounding structures, bleeding, recurrent infection, the need for additional procedures, failure to diagnose a condition, and creating a complication requiring transfusion or operation were discussed with the patient. The patient concurred with the proposed  plan, giving informed consent.  The site of surgery was properly noted/marked if necessary per policy. The patient has been actively warmed in preoperative area. Preoperative antibiotics are not indicated. Venous thrombosis prophylaxis are not indicated.    Procedure Details: 43-year-old white male for chronic venous stasis ulcer posterior left calf been treating him for many months at the wound center the left calf is and the remainder of his ulcers are doing well all his ulcers have healed except for the left calf which is stalled.  Cultures are positive he is currently on oral antibiotics.  Patient being taken to surgery for debridement and skin graft.  Consent for review signed and witnessed no contraindication noted.    Patient taken the OR placed on the table in prone position the left leg was then prepped scrubbed draped usual aseptic manner.  Patient was then directed left calf where a 15 x 20 cm  Ulcer was identified it was cleansed and debrided with pulse lavage normal saline the myriad graft 10 x 20 cm 10 x 20 cm was then applied under proper technique as usual and sutured in position with 4-0 Vicryl Steri-Strips for to reinforce Adaptic and silver cell dressing.  2 layer compression dressing was then applied to the left leg.    Patient taken to recovery room with vital signs stable tolerated procedure local well he will be discharged today I will follow him up in the wound center.  Complications:  None; patient tolerated the procedure well.    Disposition: PACU - hemodynamically stable.  Condition: stable         Additional Details:     Attending Attestation: I was present and scrubbed for the entire procedure.    Libra Smalls  Phone Number: [phone]

## 2024-03-25 NOTE — INTERVAL H&P NOTE
H&P reviewed. The patient was examined and there are no changes to the H&P.  Patient to undergo Procedure(s):  LEFT CALF WOUND DEBRIDEMENT WITH SKIN GRAFT Application

## 2024-03-27 ENCOUNTER — APPOINTMENT (OUTPATIENT)
Dept: WOUND CARE | Facility: CLINIC | Age: 44
End: 2024-03-27
Payer: COMMERCIAL

## 2024-03-28 ASSESSMENT — PAIN SCALES - GENERAL: PAINLEVEL_OUTOF10: 0 - NO PAIN

## 2024-04-03 ENCOUNTER — OFFICE VISIT (OUTPATIENT)
Dept: WOUND CARE | Facility: CLINIC | Age: 44
End: 2024-04-03
Payer: COMMERCIAL

## 2024-04-03 PROCEDURE — 29581 APPL MULTLAYER CMPRN SYS LEG: CPT | Mod: LT

## 2024-04-10 ENCOUNTER — OFFICE VISIT (OUTPATIENT)
Dept: WOUND CARE | Facility: CLINIC | Age: 44
End: 2024-04-10
Payer: COMMERCIAL

## 2024-04-10 PROCEDURE — 29581 APPL MULTLAYER CMPRN SYS LEG: CPT | Mod: LT

## 2024-04-15 ENCOUNTER — HOSPITAL ENCOUNTER (OUTPATIENT)
Facility: HOSPITAL | Age: 44
Setting detail: OUTPATIENT SURGERY
Discharge: HOME | End: 2024-04-15
Attending: PODIATRIST | Admitting: PODIATRIST
Payer: COMMERCIAL

## 2024-04-15 VITALS
DIASTOLIC BLOOD PRESSURE: 69 MMHG | WEIGHT: 308.64 LBS | HEART RATE: 63 BPM | SYSTOLIC BLOOD PRESSURE: 128 MMHG | RESPIRATION RATE: 16 BRPM | OXYGEN SATURATION: 98 % | BODY MASS INDEX: 48.44 KG/M2 | HEIGHT: 67 IN | TEMPERATURE: 96.8 F

## 2024-04-15 DIAGNOSIS — S81.809S: ICD-10-CM

## 2024-04-15 DIAGNOSIS — L03.115 CELLULITIS OF RIGHT LOWER LIMB: Primary | ICD-10-CM

## 2024-04-15 PROCEDURE — 7100000009 HC PHASE TWO TIME - INITIAL BASE CHARGE: Performed by: PODIATRIST

## 2024-04-15 PROCEDURE — A4217 STERILE WATER/SALINE, 500 ML: HCPCS | Performed by: PODIATRIST

## 2024-04-15 PROCEDURE — C1763 CONN TISS, NON-HUMAN: HCPCS | Performed by: PODIATRIST

## 2024-04-15 PROCEDURE — 2720000007 HC OR 272 NO HCPCS: Performed by: PODIATRIST

## 2024-04-15 PROCEDURE — 7100000010 HC PHASE TWO TIME - EACH INCREMENTAL 1 MINUTE: Performed by: PODIATRIST

## 2024-04-15 PROCEDURE — 3600000007 HC OR TIME - EACH INCREMENTAL 1 MINUTE - PROCEDURE LEVEL TWO: Performed by: PODIATRIST

## 2024-04-15 PROCEDURE — 3600000002 HC OR TIME - INITIAL BASE CHARGE - PROCEDURE LEVEL TWO: Performed by: PODIATRIST

## 2024-04-15 PROCEDURE — 2500000004 HC RX 250 GENERAL PHARMACY W/ HCPCS (ALT 636 FOR OP/ED): Performed by: PODIATRIST

## 2024-04-15 PROCEDURE — 2780000003 HC OR 278 NO HCPCS: Performed by: PODIATRIST

## 2024-04-15 RX ORDER — SODIUM CHLORIDE 0.9 G/100ML
IRRIGANT IRRIGATION AS NEEDED
Status: DISCONTINUED | OUTPATIENT
Start: 2024-04-15 | End: 2024-04-15 | Stop reason: HOSPADM

## 2024-04-15 RX ORDER — SULFAMETHOXAZOLE AND TRIMETHOPRIM 800; 160 MG/1; MG/1
1 TABLET ORAL 2 TIMES DAILY
Qty: 28 TABLET | Refills: 0 | Status: SHIPPED | OUTPATIENT
Start: 2024-04-15 | End: 2024-04-29

## 2024-04-15 ASSESSMENT — PAIN - FUNCTIONAL ASSESSMENT
PAIN_FUNCTIONAL_ASSESSMENT: 0-10
PAIN_FUNCTIONAL_ASSESSMENT: 0-10

## 2024-04-15 ASSESSMENT — ENCOUNTER SYMPTOMS
ENDOCRINE NEGATIVE: 1
CARDIOVASCULAR NEGATIVE: 1
MUSCULOSKELETAL NEGATIVE: 1
ALLERGIC/IMMUNOLOGIC NEGATIVE: 1
GASTROINTESTINAL NEGATIVE: 1
WOUND: 1
NEUROLOGICAL NEGATIVE: 1
PSYCHIATRIC NEGATIVE: 1
HEMATOLOGIC/LYMPHATIC NEGATIVE: 1
EYES NEGATIVE: 1
CONSTITUTIONAL NEGATIVE: 1
RESPIRATORY NEGATIVE: 1

## 2024-04-15 ASSESSMENT — PAIN SCALES - GENERAL
PAINLEVEL_OUTOF10: 0 - NO PAIN
PAINLEVEL_OUTOF10: 0 - NO PAIN

## 2024-04-15 NOTE — OP NOTE
LEFT CALF DEBRIDEMENT WITH MYRIAD GRAFT  **10x20 FROM [facility] TO BRING IN LARGER SIZE ALSO** (L) Operative Note     Date: 4/15/2024  OR Location: [facility] OR    Name: Chris Chance, : 1980, Age: 43 y.o., MRN: [id number], Sex: male    Diagnosis  Venous ulcer left calf, chronic, edema left calf. Post-op Diagnosis     * Ulcer of toe of left foot, with necrosis of muscle (Multi) [L97.523]     Procedures  LEFT CALF DEBRIDEMENT WITH MYRIAD GRAFT  **10x20 FROM [facility] TO BRING IN LARGER SIZE ALSO**  69311 - [place] SPLIT AGRFT F/S/N/H/F/G/M/D GT 1ST 100 CM/</1 %      Surgeons      * Libra Smalls - Primary    Resident/Fellow/Other Assistant:  PHAN Lopez DPM resident    Procedure Summary  Anesthesia: Local  ASA: ASA status not filed in the log.  Anesthesia Staff: No anesthesia staff entered.  Estimated Blood Loss: 0mL  Intra-op Medications: Administrations occurring from 1300 to 1415 on 04/15/24:  * No intraprocedure medications in log *           Anesthesia Record               Intraprocedure I/O Totals       None           Specimen: No specimens collected     Staff:   Circulator: Lilia Trivedi RN; Jenny Sebastian RN  Scrub Person: Eleonora Mejia RN         Drains and/or Catheters: * None in log *    Tourniquet Times:         Implants:  Implants       Type Name Action Serial No.      Implant GRAFT, MYRIAD, MATRIX, 10 X 20 CM, 2-LAYER - [id number] Implanted               Findings: CVU left calf, morbid obesity, CVI left    Indications: Chris Chance is an 43 y.o. male who is having surgery for L97.523. Graft left calf.    The patient was seen in the preoperative area. The risks, benefits, complications, treatment options, non-operative alternatives, expected recovery and outcomes were discussed with the patient. The possibilities of reaction to medication, pulmonary aspiration, injury to surrounding structures, bleeding, recurrent infection, the need for additional procedures, failure to  diagnose a condition, and creating a complication requiring transfusion or operation were discussed with the patient. The patient concurred with the proposed plan, giving informed consent.  The site of surgery was properly noted/marked if necessary per policy. The patient has been actively warmed in preoperative area. Preoperative antibiotics are not indicated. Venous thrombosis prophylaxis are not indicated.    Procedure Details: 43-year-old white male well-known to me with chronic venous ulcer left calf he is approximately 4 weeks status post split thickness graft is being taken back to surgery for repeat procedure no clinical signs of infection is progressing well from the previous graft will recommend repeat consent form completed proceed as planned under local.    Patient taken the OR placed on table in prone position the left calf was prepped scrubbed and draped usual aseptic manner.  Small curette all margins were debrided through the subfascial layer removing all nonviable tissue.  Bed clean no sign of infection.  After copious debridement pulse lavage.  10 x 12 graft was then applied and fixated with 4-0 Vicryl suture Steri-Strips.  Adaptic silver cell and multilayer compression dressing was then applied to the left leg.    Patient taken to recovery room stable good condition discharged today under full weightbearing we will see in the wound center in 5 days he is to continue his Bactrim keep dressing clean dry and elevate legs  Complications:  None; patient tolerated the procedure well.    Disposition: PACU - hemodynamically stable.  Condition: stable         Additional Details:     Attending Attestation: I was present and scrubbed for the entire procedure.    Libra Smalls  Phone Number: [phone]

## 2024-04-15 NOTE — H&P
History Of Present Illness  Chris Chance is a 43 y.o. male presenting today for graft placement to left calf wound. Patient has been following up regularly at the wound center. Patient denies CP, SOB, fever/chills or N/V/D. Patient denies pain to the left lower extremity. No further complaints.      Past Medical History  Past Medical History:   Diagnosis Date    Abnormal levels of other serum enzymes     Elevated liver enzymes    Lateral epicondylitis, right elbow     Right tennis elbow    Other hypertrophic disorders of the skin     Skin tag    Personal history of other diseases of the nervous system and sense organs     History of serous otitis media    Personal history of other diseases of the respiratory system     History of bronchitis    Personal history of other specified conditions     History of abdominal pain    Plantar fascial fibromatosis     Plantar fasciitis    Unspecified asthma, uncomplicated ([facility])     Asthmatic bronchitis     Surgical History  Past Surgical History:   Procedure Laterality Date    OTHER SURGICAL HISTORY  09/22/2015    History Of Prior Surgery     Social History  He reports that he has never smoked. He has never used smokeless tobacco. He reports that he does not drink alcohol and does not use drugs.    Family History  No family history on file.     Allergies  Patient has no known allergies.    Review of Systems   Constitutional: Negative.    HENT: Negative.     Eyes: Negative.    Respiratory: Negative.     Cardiovascular: Negative.    Gastrointestinal: Negative.    Endocrine: Negative.    Genitourinary: Negative.    Musculoskeletal: Negative.    Skin:  Positive for wound (left calf).   Allergic/Immunologic: Negative.    Neurological: Negative.    Hematological: Negative.    Psychiatric/Behavioral: Negative.       Physical Exam  Constitutional:       General: He is not in acute distress.     Appearance: He is obese.   HENT:      Head: Normocephalic and atraumatic.      Right  "Ear: External ear normal.      Left Ear: External ear normal.      Nose: Nose normal.      Mouth/Throat:      Mouth: Mucous membranes are dry.      Pharynx: Oropharynx is clear.   Eyes:      Conjunctiva/sclera: Conjunctivae normal.   Cardiovascular:      Rate and Rhythm: Normal rate and regular rhythm.      Pulses: Normal pulses.   Pulmonary:      Effort: Pulmonary effort is normal.      Breath sounds: Normal breath sounds.   Abdominal:      General: Bowel sounds are normal. There is no distension.      Palpations: Abdomen is soft.   Musculoskeletal:         General: Normal range of motion.      Cervical back: Normal range of motion and neck supple.   Skin:     General: Skin is warm and dry.      Capillary Refill: Capillary refill takes less than 2 seconds.      Comments: Left calf wound   Neurological:      General: No focal deficit present.      Mental Status: He is alert and oriented to person, place, and time. Mental status is at baseline.   Psychiatric:         Mood and Affect: Mood normal.         Behavior: Behavior normal.         Thought Content: Thought content normal.         Judgment: Judgment normal.     Last Recorded Vitals  Blood pressure 119/75, pulse 67, temperature 36 °C (96.8 °F), temperature source Temporal, resp. rate 20, height 1.702 m (5' 7\"), weight 140 kg (308 lb 10.3 oz), SpO2 96%.    Relevant Results  Current Outpatient Medications   Medication Instructions    acetaminophen (TYLENOL) 650 mg, oral, Every 4 hours PRN    albuterol 90 mcg/actuation inhaler 2 puffs, inhalation, Every 6 hours PRN    cholecalciferol (VITAMIN D-3) 50 mcg, oral, Daily    docusate sodium (COLACE) 100 mg, oral, 2 times daily PRN    fluticasone propion-salmeteroL (Advair Diskus) 250-50 mcg/dose diskus inhaler 1 puff, inhalation, 2 times daily RT, Rinse mouth with water after use to reduce aftertaste and incidence of candidiasis. Do not swallow.    furosemide (LASIX) 20 mg, oral, Daily    gabapentin (NEURONTIN) 200 mg, " oral, 3 times daily    ibuprofen 800 mg, oral, 3 times daily PRN    lactobacillus acidophilus tablet tablet 1 tablet, oral, Daily    lisinopriL-hydrochlorothiazide 20-12.5 mg tablet     magnesium oxide (MAG-OX) 400 mg, oral, Daily    metoprolol succinate XL (TOPROL-XL) 50 mg, oral, Daily, Do not crush or chew.    metoprolol succinate XL (TOPROL-XL) 25 mg, oral, Daily, Do not crush or chew.    nystatin (Mycostatin) 100,000 unit/gram powder Topical, 2 times daily    nystatin-triamcinolone (Mycolog II) cream Topical, 2 times daily    polyethylene glycol (GLYCOLAX, MIRALAX) 17 g, oral, Daily    simethicone (MYLICON) 80 mg, oral, 3 times daily PRN    sulfamethoxazole-trimethoprim (Bactrim DS) 800-160 mg tablet 1 tablet, oral, 2 times daily    Symbicort 160-4.5 mcg/actuation inhaler 2 puffs, inhalation, 2 times daily     Lab Results   Component Value Date    WBC 7.2 02/21/2024    HGB 10.2 (L) 02/21/2024    HCT 33.5 (L) 02/21/2024    MCV 98 02/21/2024     (H) 02/21/2024     Lab Results   Component Value Date    GLUCOSE 84 02/21/2024    CALCIUM 9.5 02/21/2024     02/21/2024    K 4.5 02/21/2024    CO2 29 02/21/2024     02/21/2024    BUN 7 02/21/2024    CREATININE 0.70 02/21/2024     Lab Results   Component Value Date    HGBA1C 4.4 02/21/2024     ECG 10/7/2023  Sinus tachycardia  Abnormal R-wave progression, early transition  Abnormal T, consider ischemia, anterior leads    === 09/28/23 ===  XR CHEST 1 VIEW  - Impression -  No acute cardiopulmonary process. The patient's known bilateral  patchy ground-glass opacity seen on CT angiogram of the chest dated  10/02/2023 are not appreciated radiographically.    Assessment/Plan   Active Problems:  There are no active Hospital Problems.    #Chronic non-pressure ulceration, left calf  #HTN  #Asthma  #MICHAEL  #HLD     Plan for OR today for myriad graft placement, left calf wound. No need for anesthesia, as wound is not painful. Patient declines local anesthesia.      Patient discussed with attending physician.     Megan Lopez DPM PGY-1

## 2024-04-24 ENCOUNTER — OFFICE VISIT (OUTPATIENT)
Dept: WOUND CARE | Facility: CLINIC | Age: 44
End: 2024-04-24
Payer: COMMERCIAL

## 2024-04-24 PROCEDURE — 29581 APPL MULTLAYER CMPRN SYS LEG: CPT | Mod: LT

## 2024-05-01 ENCOUNTER — OFFICE VISIT (OUTPATIENT)
Dept: WOUND CARE | Facility: CLINIC | Age: 44
End: 2024-05-01
Payer: COMMERCIAL

## 2024-05-01 PROCEDURE — 29581 APPL MULTLAYER CMPRN SYS LEG: CPT | Mod: LT
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
## 2024-05-08 ENCOUNTER — LAB REQUISITION (OUTPATIENT)
Dept: LAB | Facility: HOSPITAL | Age: 44
End: 2024-05-08
Payer: COMMERCIAL

## 2024-05-08 ENCOUNTER — OFFICE VISIT (OUTPATIENT)
Dept: WOUND CARE | Facility: CLINIC | Age: 44
End: 2024-05-08
Payer: COMMERCIAL

## 2024-05-08 DIAGNOSIS — L97.812 NON-PRESSURE CHRONIC ULCER OF OTHER PART OF RIGHT LOWER LEG WITH FAT LAYER EXPOSED (MULTI): ICD-10-CM

## 2024-05-08 DIAGNOSIS — I87.2 VENOUS INSUFFICIENCY (CHRONIC) (PERIPHERAL): ICD-10-CM

## 2024-05-08 DIAGNOSIS — L97.822 NON-PRESSURE CHRONIC ULCER OF OTHER PART OF LEFT LOWER LEG WITH FAT LAYER EXPOSED (MULTI): ICD-10-CM

## 2024-05-08 DIAGNOSIS — E66.01 MORBID (SEVERE) OBESITY DUE TO EXCESS CALORIES (MULTI): ICD-10-CM

## 2024-05-08 PROCEDURE — 87070 CULTURE OTHR SPECIMN AEROBIC: CPT | Mod: OUT,PARLAB | Performed by: PODIATRIST

## 2024-05-08 PROCEDURE — 29581 APPL MULTLAYER CMPRN SYS LEG: CPT | Mod: LT

## 2024-05-11 LAB
BACTERIA SPEC CULT: ABNORMAL
GRAM STN SPEC: ABNORMAL
GRAM STN SPEC: ABNORMAL

## 2024-05-15 ENCOUNTER — LAB (OUTPATIENT)
Dept: LAB | Facility: LAB | Age: 44
End: 2024-05-15
Payer: COMMERCIAL

## 2024-05-15 ENCOUNTER — OFFICE VISIT (OUTPATIENT)
Dept: WOUND CARE | Facility: CLINIC | Age: 44
End: 2024-05-15
Payer: COMMERCIAL

## 2024-05-15 DIAGNOSIS — R79.89 ABNORMAL TSH: ICD-10-CM

## 2024-05-15 LAB
T4 FREE SERPL-MCNC: 0.89 NG/DL (ref 0.61–1.12)
TSH SERPL-ACNC: 2.55 MIU/L (ref 0.44–3.98)

## 2024-05-15 PROCEDURE — 84443 ASSAY THYROID STIM HORMONE: CPT

## 2024-05-15 PROCEDURE — 84439 ASSAY OF FREE THYROXINE: CPT

## 2024-05-15 PROCEDURE — 29581 APPL MULTLAYER CMPRN SYS LEG: CPT | Mod: LT

## 2024-05-15 PROCEDURE — 36415 COLL VENOUS BLD VENIPUNCTURE: CPT

## 2024-05-20 ENCOUNTER — HOSPITAL ENCOUNTER (OUTPATIENT)
Facility: HOSPITAL | Age: 44
Setting detail: OUTPATIENT SURGERY
Discharge: HOME | End: 2024-05-20
Attending: PODIATRIST | Admitting: PODIATRIST
Payer: COMMERCIAL

## 2024-05-20 VITALS
HEIGHT: 67 IN | HEART RATE: 71 BPM | RESPIRATION RATE: 18 BRPM | DIASTOLIC BLOOD PRESSURE: 69 MMHG | TEMPERATURE: 98.2 F | BODY MASS INDEX: 48.44 KG/M2 | WEIGHT: 308.64 LBS | OXYGEN SATURATION: 99 % | SYSTOLIC BLOOD PRESSURE: 125 MMHG

## 2024-05-20 PROCEDURE — C1763 CONN TISS, NON-HUMAN: HCPCS | Performed by: PODIATRIST

## 2024-05-20 PROCEDURE — 2500000004 HC RX 250 GENERAL PHARMACY W/ HCPCS (ALT 636 FOR OP/ED): Performed by: PODIATRIST

## 2024-05-20 PROCEDURE — 2720000007 HC OR 272 NO HCPCS: Performed by: PODIATRIST

## 2024-05-20 PROCEDURE — A4217 STERILE WATER/SALINE, 500 ML: HCPCS | Performed by: PODIATRIST

## 2024-05-20 PROCEDURE — 3600000007 HC OR TIME - EACH INCREMENTAL 1 MINUTE - PROCEDURE LEVEL TWO: Performed by: PODIATRIST

## 2024-05-20 PROCEDURE — 7100000010 HC PHASE TWO TIME - EACH INCREMENTAL 1 MINUTE: Performed by: PODIATRIST

## 2024-05-20 PROCEDURE — 2780000003 HC OR 278 NO HCPCS: Performed by: PODIATRIST

## 2024-05-20 PROCEDURE — 3600000002 HC OR TIME - INITIAL BASE CHARGE - PROCEDURE LEVEL TWO: Performed by: PODIATRIST

## 2024-05-20 PROCEDURE — 7100000009 HC PHASE TWO TIME - INITIAL BASE CHARGE: Performed by: PODIATRIST

## 2024-05-20 RX ORDER — SODIUM CHLORIDE 0.9 G/100ML
IRRIGANT IRRIGATION AS NEEDED
Status: DISCONTINUED | OUTPATIENT
Start: 2024-05-20 | End: 2024-05-20 | Stop reason: HOSPADM

## 2024-05-20 ASSESSMENT — PAIN SCALES - GENERAL
PAINLEVEL_OUTOF10: 0 - NO PAIN
PAINLEVEL_OUTOF10: 0 - NO PAIN

## 2024-05-20 ASSESSMENT — PAIN - FUNCTIONAL ASSESSMENT
PAIN_FUNCTIONAL_ASSESSMENT: 0-10
PAIN_FUNCTIONAL_ASSESSMENT: 0-10

## 2024-05-20 NOTE — H&P
3eHistory Of Present Illness  Chris Chance is a 43 y.o. male presenting with CVU left calf for repeat STSGraft.     Past Medical History  He has a past medical history of Abnormal levels of other serum enzymes, Lateral epicondylitis, right elbow, Other hypertrophic disorders of the skin, Personal history of other diseases of the nervous system and sense organs, Personal history of other diseases of the respiratory system, Personal history of other specified conditions, Plantar fascial fibromatosis, and Unspecified asthma, uncomplicated ([facility]).    Surgical History  He has a past surgical history that includes Other surgical history (09/22/2015).     Social History  He reports that he has never smoked. He has never used smokeless tobacco. He reports that he does not drink alcohol and does not use drugs.    Family History  No family history on file.     Allergies  Patient has no known allergies.    Review of Systems    REVIEW OF SYSTEMS  GENERAL:  Negative for malaise, significant weight loss, fever  HEENT:  No changes in hearing or vision, no nose bleeds or other nasal problems and Negative for frequent or significant headaches  NECK:  Negative for lumps, goiter, pain and significant neck swelling  RESPIRATORY:  Negative for cough, wheezing and shortness of breath  CARDIOVASCULAR:  Negative for chest pain, leg swelling and palpitations  GI:  Negative for abdominal discomfort, blood in stools or black stools and change in bowel habits  :  Negative for dysuria, frequency and incontinence  MUSCULOSKELETAL:  Negative for joint pain or swelling, back pain, and muscle pain.  SKIN:  Negative for lesions, rash, and itching  PSYCH:  Negative for sleep disturbance, mood disorder and recent psychosocial stressors  HEMATOLOGY/LYMPHOLOGY:  Negative for prolonged bleeding, bruising easily, and swollen nodes.  ENDOCRINE:  Negative for cold or heat intolerance, polyuria, polydipsia and goiter  NEURO: Negative, denies any  "burning, tingling or numbness     Objective:   Vasc: DP and PT pulses are palpable bilateral.  CFT is less than 3 seconds bilateral.  Skin temperature is warm to cool proximal to distal bilateral.      Neuro:  Light touch is intact to the foot bilateral.  Protective sensation is intact to the foot when tested with the 5.07 SWM bilateral.  There is no clonus noted.  The hallux is downgoing bilateral.      Derm: Nails 1-5 bilateral are intact.  Skin is supple with normal texture and turgor noted.  Webspaces are clean, dry and intact bilateral.  There are no hyperkeratoses, ulcerations, verruca or other lesions noted.      Ortho: Muscle strength is 5/5 for all pedal groups tested.  Ankle joint, subtalar joint, 1st MPJ and lesser MPJ ROM is full and without pain or crepitus.  The foot type is rectus bilateral off weight bearing.  There are no structural deformities noted.        Physical Exam     Last Recorded Vitals  Blood pressure 127/76, pulse 79, temperature 36.5 °C (97.7 °F), temperature source Temporal, resp. rate 20, height 1.702 m (5' 7\"), weight 140 kg (308 lb 10.3 oz), SpO2 96%.    Relevant Results        CVU left calf     Assessment/Plan   Active Problems:  There are no active Hospital Problems.             I spent 20minutes in the professional and overall care of this patient.      Libra Smalls DPM    "

## 2024-05-20 NOTE — OP NOTE
LEFT CALF DEBRIDEMENT WITH MYRIAD GRAFT (L) Operative Note     Date: 2024  OR Location: [facility] OR    Name: Chris Chance, : 1980, Age: 43 y.o., MRN: [id number], Sex: male    Diagnosis  Pre-op Diagnosis     * Ulcer of toe of left foot, with necrosis of muscle (Multi) [L97.523] Post-op Diagnosis     * Ulcer of toe of left foot, with necrosis of muscle (Multi) [L97.523]     Procedures  LEFT CALF DEBRIDEMENT WITH MYRIAD GRAFT  79056 - SD SPLIT AGRFT F/S/N/H/F/G/M/D GT 1ST 100 CM/</1 %      Surgeons      * Libra Smalls - Primary    Resident/Fellow/Other Assistant:  Surgeons and Role:     * Maximus Lima DPM - Resident - Assisting    Procedure Summary  Anesthesia: Local  ASA: ASA status not filed in the log.  Anesthesia Staff: Anesthesiologist: Ankur Ford MD  Estimated Blood Loss: 0mL  Intra-op Medications:   Administrations occurring from 1310 to 1445 on 24:   Medication Name Total Dose   sodium chloride 0.9 % irrigation solution 1,000 mL         Intraprocedure I/O Totals       None           Specimen: No specimens collected     Staff:   Circulator: Airam Leblanc RN; Lindsey Bedoya RN; Irma Connor RN  Scrub Person: Baylee Bishop; Laila Solorzano RN         Drains and/or Catheters: * None in log *    Tourniquet Times: none used        Implants: 10x20 myriad graft    Findings: CVU left calf approx 10x20 cm.    Indications: Chris Chance is an 43 y.o. male who is having surgery for L97.523. Left calf large CVU.    The patient was seen in the preoperative area. The risks, benefits, complications, treatment options, non-operative alternatives, expected recovery and outcomes were discussed with the patient. The possibilities of reaction to medication, pulmonary aspiration, injury to surrounding structures, bleeding, recurrent infection, the need for additional procedures, failure to diagnose a condition, and creating a complication requiring transfusion or operation were  discussed with the patient. The patient concurred with the proposed plan, giving informed consent.  The site of surgery was properly noted/marked if necessary per policy. The patient has been actively warmed in preoperative area. Preoperative antibiotics are not indicated. Venous thrombosis prophylaxis are not indicated.    Procedure Details: 43-year-old white male well-known to me he has been treated in the primary wound center for the last several months for chronic venous stasis ulcer on his left calf he has had 2 myriad grafts at this point I we are taking him back to surgery for another graft and appears to be progressing well continue with the plan.  Consent form completed.    Patient taken OR placed on table in prone position the left calf was then prepped scrubbed draped usual aseptic manner.  Excisional subcutaneous debridement with a #3 curette removing all nonviable tissue wounds then flushed and irrigated with normal saline..  Graft was then placed upon the wound bed and fixated into position with 4-0 Vicryl suture Adaptic silver cell and a multilayer compression wrap was then applied.    Patient taken covering via stable good condition Toller procedure local well I will see him in 3 days follow-up.  Complications:  None; patient tolerated the procedure well.    Disposition: PACU - hemodynamically stable.  Condition: stable         Additional Details:     Attending Attestation: I was present and scrubbed for the entire procedure.    Libra Smalls  Phone Number: [phone]

## 2024-05-22 ENCOUNTER — OFFICE VISIT (OUTPATIENT)
Dept: WOUND CARE | Facility: CLINIC | Age: 44
End: 2024-05-22
Payer: COMMERCIAL

## 2024-05-22 PROCEDURE — 29581 APPL MULTLAYER CMPRN SYS LEG: CPT | Mod: LT

## 2024-05-28 ENCOUNTER — OFFICE VISIT (OUTPATIENT)
Dept: PRIMARY CARE | Facility: CLINIC | Age: 44
End: 2024-05-28
Payer: COMMERCIAL

## 2024-05-28 VITALS
DIASTOLIC BLOOD PRESSURE: 60 MMHG | BODY MASS INDEX: 44.42 KG/M2 | OXYGEN SATURATION: 96 % | SYSTOLIC BLOOD PRESSURE: 110 MMHG | HEART RATE: 69 BPM | HEIGHT: 67 IN | WEIGHT: 283 LBS | RESPIRATION RATE: 18 BRPM

## 2024-05-28 DIAGNOSIS — I10 BENIGN ESSENTIAL HYPERTENSION: Primary | ICD-10-CM

## 2024-05-28 DIAGNOSIS — E66.01 MORBID OBESITY (MULTI): ICD-10-CM

## 2024-05-28 DIAGNOSIS — L03.116 CELLULITIS OF LEFT LOWER LIMB: ICD-10-CM

## 2024-05-28 DIAGNOSIS — E78.2 MIXED HYPERLIPIDEMIA: ICD-10-CM

## 2024-05-28 PROBLEM — Z86.39 HISTORY OF HYPERCHOLESTEROLEMIA: Status: RESOLVED | Noted: 2024-02-27 | Resolved: 2024-05-28

## 2024-05-28 PROCEDURE — 99213 OFFICE O/P EST LOW 20 MIN: CPT | Performed by: INTERNAL MEDICINE

## 2024-05-28 PROCEDURE — 3074F SYST BP LT 130 MM HG: CPT | Performed by: INTERNAL MEDICINE

## 2024-05-28 PROCEDURE — 1036F TOBACCO NON-USER: CPT | Performed by: INTERNAL MEDICINE

## 2024-05-28 PROCEDURE — 3078F DIAST BP <80 MM HG: CPT | Performed by: INTERNAL MEDICINE

## 2024-05-28 RX ORDER — LEVOFLOXACIN 750 MG/1
750 TABLET ORAL DAILY
COMMUNITY

## 2024-05-28 ASSESSMENT — PAIN SCALES - GENERAL: PAINLEVEL: 0-NO PAIN

## 2024-05-29 ENCOUNTER — OFFICE VISIT (OUTPATIENT)
Dept: WOUND CARE | Facility: CLINIC | Age: 44
End: 2024-05-29
Payer: COMMERCIAL

## 2024-05-29 PROCEDURE — 29581 APPL MULTLAYER CMPRN SYS LEG: CPT | Mod: LT

## 2024-05-29 NOTE — PROGRESS NOTES
"Subjective   Chris Chance is a 43 y.o. male who presents for No chief complaint on file..  Patient presents for follow-up of blood work.  Lost another 25 pounds intentionally since his last visit.  Patient is following with podiatry at [facility].  He recently had a third skin graft placed last Monday.  He is currently on Levaquin.  He will be seeing podiatry tomorrow.  No fevers or chills.  He is hoping to get clearance from podiatry to be near water to go fishing on his boat this summer.        Objective     /60 (BP Location: Right arm, Patient Position: Sitting, BP Cuff Size: Adult)   Pulse 69   Resp 18   Ht 1.702 m (5' 7\")   Wt 128 kg (283 lb)   SpO2 96%   BMI 44.32 kg/m²      Physical Exam  Constitutional:       Appearance: He is obese. He is ill-appearing.   HENT:      Head: Normocephalic and atraumatic.      Nose: Nose normal.      Mouth/Throat:      Mouth: Mucous membranes are moist.      Pharynx: Oropharynx is clear.   Eyes:      Extraocular Movements: Extraocular movements intact.      Pupils: Pupils are equal, round, and reactive to light.   Cardiovascular:      Rate and Rhythm: Normal rate and regular rhythm.   Pulmonary:      Effort: No respiratory distress.      Breath sounds: Normal breath sounds. No wheezing, rhonchi or rales.   Abdominal:      General: Bowel sounds are normal. There is no distension.      Palpations: Abdomen is soft.      Tenderness: There is no abdominal tenderness. There is no guarding.   Musculoskeletal:      Right lower leg: No edema.      Left lower leg: No edema.   Skin:     General: Skin is warm and dry.   Neurological:      Mental Status: He is alert and oriented to person, place, and time. Mental status is at baseline.   Psychiatric:         Mood and Affect: Mood normal.         Behavior: Behavior normal.         Assessment/Plan   Problem List Items Addressed This Visit       Benign essential hypertension - Primary     Blood pressure controlled  Continue " current medications         Hyperlipidemia     Continue current medications         Morbid obesity (Multi)     Patient steadily losing weight through diet         Cellulitis of left lower limb     Currently on Levaquin  Deferred to podiatry          Maintain follow-up with podiatry  Thyroid labs back to normal  Return in February for a physical       Erik Avila DO

## 2024-06-02 NOTE — CARE PLAN
The patient's goals for the shift include  to remain safe during the shift.    The clinical goals for the shift include Patient will have improved pain control in LE    Over the shift, the patient did not make progress toward the following goals. Barriers to progression include Patient still having c/o pain. Recommendations to address these barriers include continue to monitor.     English

## 2024-06-05 ENCOUNTER — OFFICE VISIT (OUTPATIENT)
Dept: WOUND CARE | Facility: CLINIC | Age: 44
End: 2024-06-05
Payer: COMMERCIAL

## 2024-06-05 PROCEDURE — 29581 APPL MULTLAYER CMPRN SYS LEG: CPT | Mod: LT

## 2024-06-12 ENCOUNTER — APPOINTMENT (OUTPATIENT)
Dept: WOUND CARE | Facility: CLINIC | Age: 44
End: 2024-06-12
Payer: COMMERCIAL

## 2024-06-19 ENCOUNTER — OFFICE VISIT (OUTPATIENT)
Dept: WOUND CARE | Facility: CLINIC | Age: 44
End: 2024-06-19
Payer: COMMERCIAL

## 2024-06-19 PROCEDURE — 29581 APPL MULTLAYER CMPRN SYS LEG: CPT | Mod: LT

## 2024-06-26 ENCOUNTER — OFFICE VISIT (OUTPATIENT)
Dept: WOUND CARE | Facility: CLINIC | Age: 44
End: 2024-06-26
Payer: COMMERCIAL

## 2024-06-26 PROCEDURE — 29581 APPL MULTLAYER CMPRN SYS LEG: CPT | Mod: LT

## 2024-07-05 DIAGNOSIS — I10 BENIGN ESSENTIAL HYPERTENSION: ICD-10-CM

## 2024-07-05 RX ORDER — METOPROLOL SUCCINATE 25 MG/1
TABLET, EXTENDED RELEASE ORAL
Qty: 90 TABLET | Refills: 3 | Status: SHIPPED | OUTPATIENT
Start: 2024-07-05

## 2024-07-08 ENCOUNTER — HOSPITAL ENCOUNTER (OUTPATIENT)
Facility: HOSPITAL | Age: 44
Setting detail: OUTPATIENT SURGERY
Discharge: HOME | End: 2024-07-08
Attending: PODIATRIST | Admitting: PODIATRIST
Payer: COMMERCIAL

## 2024-07-08 VITALS
OXYGEN SATURATION: 99 % | TEMPERATURE: 97.7 F | HEART RATE: 76 BPM | SYSTOLIC BLOOD PRESSURE: 145 MMHG | BODY MASS INDEX: 44.29 KG/M2 | HEIGHT: 67 IN | DIASTOLIC BLOOD PRESSURE: 86 MMHG | WEIGHT: 282.19 LBS | RESPIRATION RATE: 18 BRPM

## 2024-07-08 DIAGNOSIS — S81.809S: Primary | ICD-10-CM

## 2024-07-08 PROCEDURE — 3600000007 HC OR TIME - EACH INCREMENTAL 1 MINUTE - PROCEDURE LEVEL TWO: Performed by: PODIATRIST

## 2024-07-08 PROCEDURE — 2500000005 HC RX 250 GENERAL PHARMACY W/O HCPCS: Performed by: PODIATRIST

## 2024-07-08 PROCEDURE — 2780000003 HC OR 278 NO HCPCS: Performed by: PODIATRIST

## 2024-07-08 PROCEDURE — 7100000010 HC PHASE TWO TIME - EACH INCREMENTAL 1 MINUTE: Performed by: PODIATRIST

## 2024-07-08 PROCEDURE — 2720000007 HC OR 272 NO HCPCS: Performed by: PODIATRIST

## 2024-07-08 PROCEDURE — 7100000009 HC PHASE TWO TIME - INITIAL BASE CHARGE: Performed by: PODIATRIST

## 2024-07-08 PROCEDURE — C1763 CONN TISS, NON-HUMAN: HCPCS | Performed by: PODIATRIST

## 2024-07-08 PROCEDURE — 3600000002 HC OR TIME - INITIAL BASE CHARGE - PROCEDURE LEVEL TWO: Performed by: PODIATRIST

## 2024-07-08 RX ORDER — SODIUM CHLORIDE 0.9 G/100ML
IRRIGANT IRRIGATION AS NEEDED
Status: DISCONTINUED | OUTPATIENT
Start: 2024-07-08 | End: 2024-07-08 | Stop reason: HOSPADM

## 2024-07-08 RX ORDER — LEVOFLOXACIN 750 MG/1
750 TABLET ORAL DAILY
Qty: 7 TABLET | Refills: 0 | Status: SHIPPED | OUTPATIENT
Start: 2024-07-08 | End: 2024-07-15

## 2024-07-08 ASSESSMENT — PAIN SCALES - GENERAL
PAINLEVEL_OUTOF10: 0 - NO PAIN
PAINLEVEL_OUTOF10: 0 - NO PAIN

## 2024-07-08 ASSESSMENT — PAIN - FUNCTIONAL ASSESSMENT
PAIN_FUNCTIONAL_ASSESSMENT: 0-10
PAIN_FUNCTIONAL_ASSESSMENT: 0-10

## 2024-07-08 ASSESSMENT — COLUMBIA-SUICIDE SEVERITY RATING SCALE - C-SSRS
2. HAVE YOU ACTUALLY HAD ANY THOUGHTS OF KILLING YOURSELF?: NO
6. HAVE YOU EVER DONE ANYTHING, STARTED TO DO ANYTHING, OR PREPARED TO DO ANYTHING TO END YOUR LIFE?: NO
1. IN THE PAST MONTH, HAVE YOU WISHED YOU WERE DEAD OR WISHED YOU COULD GO TO SLEEP AND NOT WAKE UP?: NO

## 2024-07-08 NOTE — OP NOTE
LEFT CALF DEBRIDEMENT WITH MYRIAD GRAFT (L) Operative Note     Date: 2024  OR Location: [facility]    Name: Chris Chance, : 1980, Age: 43 y.o., MRN: [id number], Sex: male    Diagnosis  Pre-op Diagnosis     * Ulcer of toe of left foot, with necrosis of muscle (Multi) [L97.523] Post-op Diagnosis     * Ulcer of toe of left foot, with necrosis of muscle (Multi) [L97.523]     Procedures  LEFT CALF DEBRIDEMENT WITH MYRIAD GRAFT  17151 - HI SPLIT AGRFT F/S/N/H/F/G/M/D GT 1ST 100 CM/</1 %      Surgeons      * Libra Smalls - Primary    Resident/Fellow/Other Assistant:  Surgeons and Role:  * No surgeons found with a matching role *    Procedure Summary  Anesthesia: Anesthesia type not filed in the log.  ASA: ASA status not filed in the log.  Anesthesia Staff: No anesthesia staff entered.  Estimated Blood Loss: 0mL  Intra-op Medications: Administrations occurring from 1100 to 1210 on 24:  * No intraprocedure medications in log *           Anesthesia Record               Intraprocedure I/O Totals       None           Specimen: No specimens collected     Staff:   Circulator: Lindsey  Scrub Person: Arcenio         Drains and/or Catheters: * None in log *    Tourniquet Times:         Implants:     Findings: CVU left calf    Indications: Chris Chance is an 43 y.o. male who is having surgery for L97.523. STSG left calf    The patient was seen in the preoperative area. The risks, benefits, complications, treatment options, non-operative alternatives, expected recovery and outcomes were discussed with the patient. The possibilities of reaction to medication, pulmonary aspiration, injury to surrounding structures, bleeding, recurrent infection, the need for additional procedures, failure to diagnose a condition, and creating a complication requiring transfusion or operation were discussed with the patient. The patient concurred with the proposed plan, giving informed consent.  The site of surgery was properly  noted/marked if necessary per policy. The patient has been actively warmed in preoperative area. Preoperative antibiotics are not indicated. Venous thrombosis prophylaxis are not indicated.    Procedure Details: 43-year-old white male with chronic venous stasis ulcer left calf he has had multiple skin grafts at this point is being bagged taken back to surgery for repeat patient understands all risk benefits consent form done.  No contraindication noted.    Patient taken OR placed on table in prone position the left calf was then prepped scrubbed draped usual aseptic manner.  Full debridement performed through the subcutaneous layer with tissue forceps.  On nonviable tissue removed no signs of infection.  Wound is progressing quite well and decreased in size and depth.  After appropriate debridement 10 x 20 myriad skin graft was then applied and fixated in the position with 4-0 Vicryl suture once the suturing was done dressing with Adaptic silver cell and a Lara compression wrap and a multilayer compression wrap left calf applied.    Patient taken recovery room via stable good condition Toller procedure local well he be discharged today I will see him in the wound center in approximately 2 days for follow-up.  Home-going meds include Levaquin.  Complications:  None; patient tolerated the procedure well.    Disposition: PACU - hemodynamically stable.  Condition: stable         Additional Details:     Attending Attestation: I was present and scrubbed for the entire procedure.    Libra Smalls  Phone Number: [phone]

## 2024-07-08 NOTE — H&P
3eHistory Of Present Illness  Chris Chance is a 43 y.o. male presenting with left leg ulcer. Patient states he his ulcer is going for almost a year. He has been seeing Dr. Smalls for this in outpatient clinic. He is going to once a week to wound care clinic and home health does dressing twice a week. Patient is here today for wound debridement with Myriad graft. Patient denies any constitutional symptom.       Past Medical History  He has a past medical history of Abnormal levels of other serum enzymes, Lateral epicondylitis, right elbow, Other hypertrophic disorders of the skin, Personal history of other diseases of the nervous system and sense organs, Personal history of other diseases of the respiratory system, Personal history of other specified conditions, Plantar fascial fibromatosis, and Unspecified asthma, uncomplicated ([facility]).    Surgical History  He has a past surgical history that includes Other surgical history (09/22/2015).     Social History  He reports that he has never smoked. He has never used smokeless tobacco. He reports that he does not drink alcohol and does not use drugs.    Family History  No family history on file.     Allergies  Patient has no known allergies.    Review of Systems    REVIEW OF SYSTEMS  GENERAL:  Negative for malaise, significant weight loss, fever  HEENT:  No changes in hearing or vision, no nose bleeds or other nasal problems and Negative for frequent or significant headaches  NECK:  Negative for lumps, goiter, pain and significant neck swelling  RESPIRATORY:  Negative for cough, wheezing and shortness of breath  CARDIOVASCULAR:  Negative for chest pain, leg swelling and palpitations  GI:  Negative for abdominal discomfort, blood in stools or black stools and change in bowel habits  :  Negative for dysuria, frequency and incontinence  MUSCULOSKELETAL:  Negative for joint pain or swelling, back pain, and muscle pain.  SKIN:  Negative for lesions, rash, and  "itching  PSYCH:  Negative for sleep disturbance, mood disorder and recent psychosocial stressors  HEMATOLOGY/LYMPHOLOGY:  Negative for prolonged bleeding, bruising easily, and swollen nodes.  ENDOCRINE:  Negative for cold or heat intolerance, polyuria, polydipsia and goiter  NEURO: Negative, denies any burning, tingling or numbness     Objective:   Vasc: DP and PT pulses are palpable bilateral.  CFT is less than 3 seconds bilateral.  Skin temperature is warm to cool proximal to distal bilateral.      Neuro:  Light touch is intact to the foot bilateral.  Protective sensation is intact to the foot when tested with the 5.07 SWM bilateral.  There is no clonus noted.  The hallux is downgoing bilateral.      Derm: Nails 1-5 bilateral are intact.  Skin is supple with normal texture and turgor noted.  Webspaces are clean, dry and intact bilateral.  There are no hyperkeratoses, ulcerations, verruca or other lesions noted.  Ulcer in left posterior calf.    Ortho: Muscle strength is 5/5 for all pedal groups tested.  Ankle joint, subtalar joint, 1st MPJ and lesser MPJ ROM is full and without pain or crepitus.  The foot type is rectus bilateral off weight bearing.  There are no structural deformities noted.            Last Recorded Vitals  Blood pressure 141/87, pulse 76, temperature 36 °C (96.8 °F), temperature source Temporal, resp. rate 16, height 1.702 m (5' 7\"), weight 128 kg (282 lb 3 oz), SpO2 97%.    Relevant Results             Assessment/Plan   Active Problems:  There are no active Hospital Problems.      - Left calf ulcer   - HTN    Plan:  - Left calf debridement with Myriad graft.         Case to be discussed with attending, A&P above reflects a tentative plan. Please await for the final signature from the attending physician on service.    This patient will be followed by the Podiatry service. Please Epic Chat the corresponding residents below with questions or concerns.      Silvio Baca DPM PGY-1  Podiatric " Medicine and Surgery   [facility] Secure Chat

## 2024-07-10 ENCOUNTER — OFFICE VISIT (OUTPATIENT)
Dept: WOUND CARE | Facility: CLINIC | Age: 44
End: 2024-07-10
Payer: COMMERCIAL

## 2024-07-10 PROCEDURE — 29581 APPL MULTLAYER CMPRN SYS LEG: CPT | Mod: LT

## 2024-07-17 ENCOUNTER — OFFICE VISIT (OUTPATIENT)
Dept: WOUND CARE | Facility: CLINIC | Age: 44
End: 2024-07-17
Payer: COMMERCIAL

## 2024-07-17 PROCEDURE — 29581 APPL MULTLAYER CMPRN SYS LEG: CPT | Mod: LT

## 2024-07-24 ENCOUNTER — OFFICE VISIT (OUTPATIENT)
Dept: WOUND CARE | Facility: CLINIC | Age: 44
End: 2024-07-24
Payer: COMMERCIAL

## 2024-07-24 PROCEDURE — 29581 APPL MULTLAYER CMPRN SYS LEG: CPT | Mod: LT

## 2024-07-31 ENCOUNTER — OFFICE VISIT (OUTPATIENT)
Dept: WOUND CARE | Facility: CLINIC | Age: 44
End: 2024-07-31
Payer: COMMERCIAL

## 2024-07-31 PROCEDURE — 29581 APPL MULTLAYER CMPRN SYS LEG: CPT | Mod: LT

## 2024-08-07 ENCOUNTER — APPOINTMENT (OUTPATIENT)
Dept: WOUND CARE | Facility: CLINIC | Age: 44
End: 2024-08-07
Payer: COMMERCIAL

## 2024-08-14 ENCOUNTER — OFFICE VISIT (OUTPATIENT)
Dept: WOUND CARE | Facility: CLINIC | Age: 44
End: 2024-08-14
Payer: COMMERCIAL

## 2024-08-14 ENCOUNTER — APPOINTMENT (OUTPATIENT)
Dept: WOUND CARE | Facility: CLINIC | Age: 44
End: 2024-08-14
Payer: COMMERCIAL

## 2024-08-14 PROCEDURE — 29581 APPL MULTLAYER CMPRN SYS LEG: CPT | Mod: LT

## 2024-08-21 ENCOUNTER — HOSPITAL ENCOUNTER (OUTPATIENT)
Facility: HOSPITAL | Age: 44
Setting detail: OUTPATIENT SURGERY
Discharge: HOME | End: 2024-08-21
Attending: PODIATRIST | Admitting: PODIATRIST
Payer: COMMERCIAL

## 2024-08-21 ENCOUNTER — APPOINTMENT (OUTPATIENT)
Dept: WOUND CARE | Facility: CLINIC | Age: 44
End: 2024-08-21
Payer: COMMERCIAL

## 2024-08-21 VITALS
DIASTOLIC BLOOD PRESSURE: 95 MMHG | TEMPERATURE: 96.8 F | RESPIRATION RATE: 18 BRPM | SYSTOLIC BLOOD PRESSURE: 137 MMHG | WEIGHT: 282.19 LBS | HEIGHT: 67 IN | HEART RATE: 65 BPM | OXYGEN SATURATION: 100 % | BODY MASS INDEX: 44.29 KG/M2

## 2024-08-21 PROCEDURE — 3600000007 HC OR TIME - EACH INCREMENTAL 1 MINUTE - PROCEDURE LEVEL TWO: Performed by: PODIATRIST

## 2024-08-21 PROCEDURE — 7100000010 HC PHASE TWO TIME - EACH INCREMENTAL 1 MINUTE: Performed by: PODIATRIST

## 2024-08-21 PROCEDURE — C1763 CONN TISS, NON-HUMAN: HCPCS | Performed by: PODIATRIST

## 2024-08-21 PROCEDURE — 2500000005 HC RX 250 GENERAL PHARMACY W/O HCPCS: Performed by: PODIATRIST

## 2024-08-21 PROCEDURE — 2780000003 HC OR 278 NO HCPCS: Performed by: PODIATRIST

## 2024-08-21 PROCEDURE — 3600000002 HC OR TIME - INITIAL BASE CHARGE - PROCEDURE LEVEL TWO: Performed by: PODIATRIST

## 2024-08-21 PROCEDURE — 7100000009 HC PHASE TWO TIME - INITIAL BASE CHARGE: Performed by: PODIATRIST

## 2024-08-21 RX ORDER — SODIUM CHLORIDE 0.9 G/100ML
IRRIGANT IRRIGATION AS NEEDED
Status: DISCONTINUED | OUTPATIENT
Start: 2024-08-21 | End: 2024-08-21 | Stop reason: HOSPADM

## 2024-08-21 RX ORDER — CEFAZOLIN SODIUM 2 G/100ML
2 INJECTION, SOLUTION INTRAVENOUS ONCE
Status: DISCONTINUED | OUTPATIENT
Start: 2024-08-21 | End: 2024-08-21

## 2024-08-21 RX ORDER — SODIUM CHLORIDE, SODIUM LACTATE, POTASSIUM CHLORIDE, CALCIUM CHLORIDE 600; 310; 30; 20 MG/100ML; MG/100ML; MG/100ML; MG/100ML
20 INJECTION, SOLUTION INTRAVENOUS CONTINUOUS
Status: DISCONTINUED | OUTPATIENT
Start: 2024-08-21 | End: 2024-08-21

## 2024-08-21 RX ORDER — SODIUM CHLORIDE 9 MG/ML
75 INJECTION, SOLUTION INTRAVENOUS CONTINUOUS
Status: DISCONTINUED | OUTPATIENT
Start: 2024-08-21 | End: 2024-08-21

## 2024-08-21 ASSESSMENT — COLUMBIA-SUICIDE SEVERITY RATING SCALE - C-SSRS
6. HAVE YOU EVER DONE ANYTHING, STARTED TO DO ANYTHING, OR PREPARED TO DO ANYTHING TO END YOUR LIFE?: NO
1. IN THE PAST MONTH, HAVE YOU WISHED YOU WERE DEAD OR WISHED YOU COULD GO TO SLEEP AND NOT WAKE UP?: NO
2. HAVE YOU ACTUALLY HAD ANY THOUGHTS OF KILLING YOURSELF?: NO

## 2024-08-21 ASSESSMENT — PAIN - FUNCTIONAL ASSESSMENT: PAIN_FUNCTIONAL_ASSESSMENT: 0-10

## 2024-08-21 NOTE — DISCHARGE INSTRUCTIONS
PODIATRY DISCHARGE INSTRUCTIONS  Please call and schedule an appointment with Dr. Smalls for Wedneday 8/28/24 in [facility] after discharge or sooner if any problems or questions arise.  Bear weight as tolerated in surgical shoe.  Keep dressing clean and dry until your first follow up appointment.  Do not shower unless using a cast protector to protect dressing.  If dressing gets wet, change or call office immediately as this can lead to increased risk of infection.  Place ice pack behind knee of extremity.  Elevate surgical extremity as much as possible to help with pain and swelling.  Should any problems, questions, or concerns arise, please call the clinic office.

## 2024-08-21 NOTE — H&P
Patient's History and Physical from 8/14/24 performed at the [facility] was reviewed today. There are no significant changes to the H&P. Patient is being taken for debridement and graft application today in the OR with Dr. Libra Smalls DPM. This procedure will be done with local anesthesia only.

## 2024-08-21 NOTE — OP NOTE
LEFT CALF DEBRIDEMENT WITH MYRIAD GRAFT (L) Operative Note     Date: 2024  OR Location: [facility]    Name: Chris Chance, : 1980, Age: 43 y.o., MRN: [id number], Sex: male    Diagnosis  Pre-op Diagnosis      * Ulcer of toe of left foot, with necrosis of muscle (Multi) [L97.523] Post-op Diagnosis     * Ulcer of toe of left foot, with necrosis of muscle (Multi) [L97.523]     Procedures  LEFT CALF DEBRIDEMENT WITH MYRIAD GRAFT  18017 - VT SPLIT AGRFT F/S/N/H/F/G/M/D GT 1ST 100 CM/</1 %      Surgeons      * Libra Smalls - Primary    Resident/Fellow/Other Assistant:  Surgeons and Role:  * No surgeons found with a matching role *    Procedure Summary  Anesthesia: Anesthesia type not filed in the log.  ASA: ASA status not filed in the log.  Anesthesia Staff: No anesthesia staff entered.  Estimated Blood Loss: 0mL  Intra-op Medications: * Intraprocedure medication information is unavailable because the case start and end events have not been set *      Intraprocedure I/O Totals       None           Specimen: No specimens collected     Staff:   Circulator: Eleonora  Scrub Person: Jenny Robbinsub Person: Teri  Circulator: Sindi         Drains and/or Catheters: * None in log *    Tourniquet Times:         Implants:  Implants       Type Name Action Serial No.      Implant GRAFT, SAMUEL, MATRIX, 7 X 10 CM, 2-LAYER - [id number] Implanted               Findings: CVU left calf    Indications: Chris Chance is an 43 y.o. male who is having surgery for L97.523.     The patient was seen in the preoperative area. The risks, benefits, complications, treatment options, non-operative alternatives, expected recovery and outcomes were discussed with the patient. The possibilities of reaction to medication, pulmonary aspiration, injury to surrounding structures, bleeding, recurrent infection, the need for additional procedures, failure to diagnose a condition, and creating a complication requiring transfusion or  operation were discussed with the patient. The patient concurred with the proposed plan, giving informed consent.  The site of surgery was properly noted/marked if necessary per policy. The patient has been actively warmed in preoperative area. Preoperative antibiotics have been ordered and given within 1 hours of incision. Venous thrombosis prophylaxis are not indicated.    Procedure Details: 43-year-old white male well-known to me from the wound center for chronic venous stasis ulcer left calf dressing well he is being taken back to surgery for repeat graft wound continues to progress well with grafts compression therapy and antibiotics.  Consent form done.    Patient taken the OR placed on table in prone position the left calf was then prepped scrubbed draped usual aseptic manner local block 2% lidocaine plain.  The entire calf was measured the wound now measures approximately 6 x 8 cm it was debrided through the subcutaneous layer with #3 curette all nonviable tissue removed, wound was thoroughly cleansed with normal saline.  The myriad graft 7 x 10 was then sutured into position with 4-0 Vicryl and Steri-Strips Adaptic silver cell and a multilayer compression wrap was then applied.    Patient taken recovery room via stable good condition Toller procedure and local well I will see him in the wound center follow-up.  Complications:  None; patient tolerated the procedure well.    Disposition: PACU - hemodynamically stable.  Condition: stable         Additional Details:     Attending Attestation: I was present and scrubbed for the entire procedure.    Libra Smalls  Phone Number: [phone]

## 2024-08-28 ENCOUNTER — OFFICE VISIT (OUTPATIENT)
Dept: WOUND CARE | Facility: CLINIC | Age: 44
End: 2024-08-28
Payer: COMMERCIAL

## 2024-08-28 PROCEDURE — 29581 APPL MULTLAYER CMPRN SYS LEG: CPT | Mod: LT

## 2024-09-11 ENCOUNTER — OFFICE VISIT (OUTPATIENT)
Dept: WOUND CARE | Facility: CLINIC | Age: 44
End: 2024-09-11
Payer: COMMERCIAL

## 2024-09-11 PROCEDURE — 29581 APPL MULTLAYER CMPRN SYS LEG: CPT | Mod: LT

## 2024-09-25 ENCOUNTER — OFFICE VISIT (OUTPATIENT)
Dept: WOUND CARE | Facility: CLINIC | Age: 44
End: 2024-09-25
Payer: COMMERCIAL

## 2024-09-25 PROCEDURE — 29581 APPL MULTLAYER CMPRN SYS LEG: CPT | Mod: LT

## 2024-10-02 ENCOUNTER — APPOINTMENT (OUTPATIENT)
Dept: WOUND CARE | Facility: CLINIC | Age: 44
End: 2024-10-02
Payer: COMMERCIAL

## 2024-10-09 ENCOUNTER — OFFICE VISIT (OUTPATIENT)
Dept: WOUND CARE | Facility: CLINIC | Age: 44
End: 2024-10-09
Payer: COMMERCIAL

## 2024-10-09 DIAGNOSIS — S81.802D OPEN WOUND OF LEFT LOWER EXTREMITY, SUBSEQUENT ENCOUNTER: Primary | ICD-10-CM

## 2024-10-14 ENCOUNTER — HOSPITAL ENCOUNTER (OUTPATIENT)
Facility: HOSPITAL | Age: 44
Setting detail: OUTPATIENT SURGERY
Discharge: HOME | End: 2024-10-14
Attending: PODIATRIST | Admitting: PODIATRIST
Payer: COMMERCIAL

## 2024-10-14 VITALS
HEART RATE: 69 BPM | WEIGHT: 282.19 LBS | HEIGHT: 67 IN | SYSTOLIC BLOOD PRESSURE: 154 MMHG | BODY MASS INDEX: 44.29 KG/M2 | RESPIRATION RATE: 18 BRPM | OXYGEN SATURATION: 99 % | TEMPERATURE: 97 F | DIASTOLIC BLOOD PRESSURE: 84 MMHG

## 2024-10-14 PROCEDURE — 3600000002 HC OR TIME - INITIAL BASE CHARGE - PROCEDURE LEVEL TWO: Performed by: PODIATRIST

## 2024-10-14 PROCEDURE — C1763 CONN TISS, NON-HUMAN: HCPCS | Performed by: PODIATRIST

## 2024-10-14 PROCEDURE — 7100000009 HC PHASE TWO TIME - INITIAL BASE CHARGE: Performed by: PODIATRIST

## 2024-10-14 PROCEDURE — 2720000007 HC OR 272 NO HCPCS: Performed by: PODIATRIST

## 2024-10-14 PROCEDURE — 2780000003 HC OR 278 NO HCPCS: Performed by: PODIATRIST

## 2024-10-14 PROCEDURE — 3600000007 HC OR TIME - EACH INCREMENTAL 1 MINUTE - PROCEDURE LEVEL TWO: Performed by: PODIATRIST

## 2024-10-14 PROCEDURE — 7100000010 HC PHASE TWO TIME - EACH INCREMENTAL 1 MINUTE: Performed by: PODIATRIST

## 2024-10-14 ASSESSMENT — PAIN SCALES - GENERAL
PAINLEVEL_OUTOF10: 0 - NO PAIN

## 2024-10-14 ASSESSMENT — PAIN - FUNCTIONAL ASSESSMENT
PAIN_FUNCTIONAL_ASSESSMENT: 0-10

## 2024-10-14 NOTE — OP NOTE
LEFT CALF DEBRIDEMENT WITH MYRIAD GRAFT APPLICATION (L) Operative Note     Date: 10/14/2024  OR Location: [facility]    Name: Chris Chance, : 1980, Age: 43 y.o., MRN: [id number], Sex: male    Diagnosis  Pre-op Diagnosis      * Ulcer of toe of left foot, with necrosis of muscle (Multi) [L97.523] Post-op Diagnosis     * Ulcer of toe of left foot, with necrosis of muscle (Multi) [L97.523]     Procedures  LEFT CALF DEBRIDEMENT WITH MYRIAD GRAFT APPLICATION  73916 - OR SPLIT AGRFT F/S/N/H/F/G/M/D GT 1ST 100 CM/</1 %      Surgeons      * Libra Smalls - Primary    Resident/Fellow/Other Assistant:  Surgeons and Role:     * Silvio Baca DPM - Assisting    Procedure Summary  Anesthesia: Anesthesia type not filed in the log.  ASA: ASA status not filed in the log.  Anesthesia Staff: No anesthesia staff entered.  Estimated Blood Loss: 0mL  Intra-op Medications: Administrations occurring from 1345 to 1505 on 10/14/24:  * No intraprocedure medications in log *      Intraprocedure I/O Totals       None           Specimen: No specimens collected     Staff:   Circulator: Marcio  Circulator: Mira  Scrub Person: Airam  Scrub Person: Jessica         Drains and/or Catheters: * None in log *    Tourniquet Times:         Implants:  Implants       Type Name Action Serial No.      Implant GRAFT, MYRIAD, MATRIX, 5 X 5, 3-LAYER 1MM THIN - [id number] Implanted               Findings: CVU left calf    Indications: Chris Chance is an 43 y.o. male who is having surgery for L97.523. CVU left calf.     The patient was seen in the preoperative area. The risks, benefits, complications, treatment options, non-operative alternatives, expected recovery and outcomes were discussed with the patient. The possibilities of reaction to medication, pulmonary aspiration, injury to surrounding structures, bleeding, recurrent infection, the need for additional procedures, failure to diagnose a condition, and creating a complication requiring  transfusion or operation were discussed with the patient. The patient concurred with the proposed plan, giving informed consent.  The site of surgery was properly noted/marked if necessary per policy. The patient has been actively warmed in preoperative area. Preoperative antibiotics are not indicated. Venous thrombosis prophylaxis are not indicated.    Procedure Details: 43-year-old white male well-known to me has had multiple grafts to his left calf for venous stasis ulcer is progressing well we recommended a repeat graft to expedite healing patient agreeable consent form done no contraindication noted.    Patient taken the OR placed on table in the prone position the left calf was then prepped scrubbed draped usual aseptic manner local block 2% lidocaine plain.  Ulcer was identified and measured to be approximately 3 x 5 cm.  Thickness with no signs of infection hypergranulation tissue noted the hypergranulation tissue was cauterized with Bovie.  Wound was then irrigated with copious amounts normal saline.  The myriad graft 5 x 5 cm was then applied in proper position under minimal tension was sutured into position with 4-0 Vicryl Steri-Strips applied Adaptic dressing and compression wrap left leg.    Patient taken covering via stable good condition Toller procedure local well I will follow-up in the wound center.  Complications:  None; patient tolerated the procedure well.    Disposition: PACU - hemodynamically stable.  Condition: stable         Additional Details:     Attending Attestation: I was present and scrubbed for the entire procedure.    Libra Smalls  Phone Number: [phone]

## 2024-10-14 NOTE — H&P
History Of Present Illness  Chris Chance is a 43 y.o. male presenting today for graft placement to left calf wound. Patient has been following up regularly at the wound center. Patient denies CP, SOB, fever/chills or N/V/D. Patient denies pain to the left lower extremity. No further complaints.      Past Medical History  Medical History        Past Medical History:   Diagnosis Date    Abnormal levels of other serum enzymes       Elevated liver enzymes    Lateral epicondylitis, right elbow       Right tennis elbow    Other hypertrophic disorders of the skin       Skin tag    Personal history of other diseases of the nervous system and sense organs       History of serous otitis media    Personal history of other diseases of the respiratory system       History of bronchitis    Personal history of other specified conditions       History of abdominal pain    Plantar fascial fibromatosis       Plantar fasciitis    Unspecified asthma, uncomplicated ([facility])       Asthmatic bronchitis         Surgical History  Surgical History         Past Surgical History:   Procedure Laterality Date    OTHER SURGICAL HISTORY   09/22/2015     History Of Prior Surgery         Social History  He reports that he has never smoked. He has never used smokeless tobacco. He reports that he does not drink alcohol and does not use drugs.     Family History  Family History   No family history on file.        Allergies  Patient has no known allergies.     Review of Systems   Constitutional: Negative.    HENT: Negative.     Eyes: Negative.    Respiratory: Negative.     Cardiovascular: Negative.    Gastrointestinal: Negative.    Endocrine: Negative.    Genitourinary: Negative.    Musculoskeletal: Negative.    Skin:  Positive for wound (left calf).   Allergic/Immunologic: Negative.    Neurological: Negative.    Hematological: Negative.    Psychiatric/Behavioral: Negative.        Physical Exam  Constitutional:       General: He is not in acute  "distress.     Appearance: He is obese.   HENT:      Head: Normocephalic and atraumatic.      Right Ear: External ear normal.      Left Ear: External ear normal.      Nose: Nose normal.      Mouth/Throat:      Mouth: Mucous membranes are dry.      Pharynx: Oropharynx is clear.   Eyes:      Conjunctiva/sclera: Conjunctivae normal.   Cardiovascular:      Rate and Rhythm: Normal rate and regular rhythm.      Pulses: Normal pulses.   Pulmonary:      Effort: Pulmonary effort is normal.      Breath sounds: Normal breath sounds.   Abdominal:      General: Bowel sounds are normal. There is no distension.      Palpations: Abdomen is soft.   Musculoskeletal:         General: Normal range of motion.      Cervical back: Normal range of motion and neck supple.   Skin:     General: Skin is warm and dry.      Capillary Refill: Capillary refill takes less than 2 seconds.      Comments: Left calf wound   Neurological:      General: No focal deficit present.      Mental Status: He is alert and oriented to person, place, and time. Mental status is at baseline.   Psychiatric:         Mood and Affect: Mood normal.         Behavior: Behavior normal.         Thought Content: Thought content normal.         Judgment: Judgment normal.      Last Recorded Vitals  Blood pressure 119/75, pulse 67, temperature 36 °C (96.8 °F), temperature source Temporal, resp. rate 20, height 1.702 m (5' 7\"), weight 140 kg (308 lb 10.3 oz), SpO2 96%.     Relevant Results       Current Outpatient Medications   Medication Instructions    acetaminophen (TYLENOL) 650 mg, oral, Every 4 hours PRN    albuterol 90 mcg/actuation inhaler 2 puffs, inhalation, Every 6 hours PRN    cholecalciferol (VITAMIN D-3) 50 mcg, oral, Daily    docusate sodium (COLACE) 100 mg, oral, 2 times daily PRN    fluticasone propion-salmeteroL (Advair Diskus) 250-50 mcg/dose diskus inhaler 1 puff, inhalation, 2 times daily RT, Rinse mouth with water after use to reduce aftertaste and incidence " of candidiasis. Do not swallow.    furosemide (LASIX) 20 mg, oral, Daily    gabapentin (NEURONTIN) 200 mg, oral, 3 times daily    ibuprofen 800 mg, oral, 3 times daily PRN    lactobacillus acidophilus tablet tablet 1 tablet, oral, Daily    lisinopriL-hydrochlorothiazide 20-12.5 mg tablet      magnesium oxide (MAG-OX) 400 mg, oral, Daily    metoprolol succinate XL (TOPROL-XL) 50 mg, oral, Daily, Do not crush or chew.    metoprolol succinate XL (TOPROL-XL) 25 mg, oral, Daily, Do not crush or chew.    nystatin (Mycostatin) 100,000 unit/gram powder Topical, 2 times daily    nystatin-triamcinolone (Mycolog II) cream Topical, 2 times daily    polyethylene glycol (GLYCOLAX, MIRALAX) 17 g, oral, Daily    simethicone (MYLICON) 80 mg, oral, 3 times daily PRN    sulfamethoxazole-trimethoprim (Bactrim DS) 800-160 mg tablet 1 tablet, oral, 2 times daily    Symbicort 160-4.5 mcg/actuation inhaler 2 puffs, inhalation, 2 times daily            Lab Results   Component Value Date     WBC 7.2 02/21/2024     HGB 10.2 (L) 02/21/2024     HCT 33.5 (L) 02/21/2024     MCV 98 02/21/2024      (H) 02/21/2024            Lab Results   Component Value Date     GLUCOSE 84 02/21/2024     CALCIUM 9.5 02/21/2024      02/21/2024     K 4.5 02/21/2024     CO2 29 02/21/2024      02/21/2024     BUN 7 02/21/2024     CREATININE 0.70 02/21/2024            Lab Results   Component Value Date     HGBA1C 4.4 02/21/2024      ECG 10/7/2023  Sinus tachycardia  Abnormal R-wave progression, early transition  Abnormal T, consider ischemia, anterior leads     === 09/28/23 ===  XR CHEST 1 VIEW  - Impression -  No acute cardiopulmonary process. The patient's known bilateral  patchy ground-glass opacity seen on CT angiogram of the chest dated  10/02/2023 are not appreciated radiographically.        Assessment/Plan  Active Problems:  There are no active Hospital Problems.     #Chronic non-pressure ulceration, left calf  #HTN  #Asthma  #MICHAEL  #HLD         Plan for OR today for myriad graft placement, left calf wound. No need for anesthesia, as wound is not painful. Patient declines local anesthesia

## 2024-10-14 NOTE — DISCHARGE INSTRUCTIONS
PODIATRIC SURGERY POST-OP INSTRUCTIONS    YOU HAD ANESTHESIA:  You must have a responsible adult drive you home and stay with you for the first 24 hours.    Do not drive a car or drink any alcohol for 24 hours after surgery.  Do not do any strenuous work or activity for the next 24 hours.  You may have mild nausea, a sore throat or raspy voice for a few days.        POST-OP INSTRUCTIONS:  Keep dressing clean, dry and intact until your first post-operative appointment.  Do not remove surgical dressing. You may need to loosen if necessary.   Do not shower unless using a cast protector to protect dressing.  If dressing gets wet, please call office immediately as this can lead to increased risk of infection or wound dehiscence.   Elevate surgical extremity as much as possible to help with pain and swelling.  Place ice pack behind knee of surgical extremity (20 minutes on/20 minutes off while awake). After 24 hours use ice behind the knee as needed for comfort.  Weight Bearing Status: WBAT in post-op shoe  Please resume all home medications.   Post-Operative Medications: You may take Tylenol for pain; please take as directed on bottle.  Post-operative appointment with Dr. Smalls on 10/16/29116 for post-op follow-up . Please call to schedule if not already scheduled.  Should any problems, questions, or concerns arise, please call the clinic office.    WHEN TO CALL YOUR DOCTOR:  Fever (>100.4°F or 38.0°C) or chills.  Incision problems such as redness, warmth, swelling, or foul smelling drainage.  Severe nausea or persistent vomiting.  Pain and swelling in your legs, especially if it is only on one side and not the other.  Pain with urination, cloudy urine, or foul smelling urine.  Or if you have any other problems or questions.  CALL 911 or go to the ED if you have any shortness of breath, difficulty breathing, or chest pain.

## 2024-10-16 ENCOUNTER — OFFICE VISIT (OUTPATIENT)
Dept: WOUND CARE | Facility: CLINIC | Age: 44
End: 2024-10-16
Payer: COMMERCIAL

## 2024-10-16 PROCEDURE — 29581 APPL MULTLAYER CMPRN SYS LEG: CPT | Mod: LT

## 2024-10-23 ENCOUNTER — OFFICE VISIT (OUTPATIENT)
Dept: WOUND CARE | Facility: CLINIC | Age: 44
End: 2024-10-23
Payer: COMMERCIAL

## 2024-10-23 PROCEDURE — 29581 APPL MULTLAYER CMPRN SYS LEG: CPT | Mod: LT

## 2024-10-24 DIAGNOSIS — I10 BENIGN ESSENTIAL HYPERTENSION: ICD-10-CM

## 2024-10-24 DIAGNOSIS — E66.09 OBESITY DUE TO EXCESS CALORIES WITH SERIOUS COMORBIDITY: ICD-10-CM

## 2024-10-29 RX ORDER — METOPROLOL SUCCINATE 25 MG/1
25 TABLET, EXTENDED RELEASE ORAL DAILY
Qty: 90 TABLET | Refills: 3 | Status: SHIPPED | OUTPATIENT
Start: 2024-10-29

## 2024-11-01 RX ORDER — METOPROLOL SUCCINATE 50 MG/1
50 TABLET, EXTENDED RELEASE ORAL DAILY
Qty: 90 TABLET | Refills: 0 | Status: SHIPPED | OUTPATIENT
Start: 2024-11-01

## 2024-11-06 ENCOUNTER — OFFICE VISIT (OUTPATIENT)
Dept: WOUND CARE | Facility: CLINIC | Age: 44
End: 2024-11-06
Payer: COMMERCIAL

## 2024-11-06 PROCEDURE — 29581 APPL MULTLAYER CMPRN SYS LEG: CPT

## 2024-11-20 ENCOUNTER — OFFICE VISIT (OUTPATIENT)
Dept: WOUND CARE | Facility: CLINIC | Age: 44
End: 2024-11-20
Payer: COMMERCIAL

## 2024-11-20 PROCEDURE — 29581 APPL MULTLAYER CMPRN SYS LEG: CPT | Mod: LT

## 2024-11-27 DIAGNOSIS — R60.1 GENERALIZED EDEMA: ICD-10-CM

## 2024-12-02 RX ORDER — FUROSEMIDE 20 MG/1
TABLET ORAL
Qty: 90 TABLET | Refills: 0 | Status: SHIPPED | OUTPATIENT
Start: 2024-12-02

## 2024-12-04 ENCOUNTER — OFFICE VISIT (OUTPATIENT)
Dept: WOUND CARE | Facility: CLINIC | Age: 44
End: 2024-12-04
Payer: COMMERCIAL

## 2024-12-04 DIAGNOSIS — E66.09 OBESITY DUE TO EXCESS CALORIES WITH SERIOUS COMORBIDITY: ICD-10-CM

## 2024-12-04 PROCEDURE — 29581 APPL MULTLAYER CMPRN SYS LEG: CPT | Mod: LT

## 2024-12-05 RX ORDER — METOPROLOL SUCCINATE 50 MG/1
50 TABLET, EXTENDED RELEASE ORAL DAILY
Qty: 90 TABLET | Refills: 3 | Status: SHIPPED | OUTPATIENT
Start: 2024-12-05

## 2024-12-11 ENCOUNTER — OFFICE VISIT (OUTPATIENT)
Dept: WOUND CARE | Facility: CLINIC | Age: 44
End: 2024-12-11
Payer: COMMERCIAL

## 2024-12-11 PROCEDURE — 29581 APPL MULTLAYER CMPRN SYS LEG: CPT | Mod: LT

## 2024-12-18 ENCOUNTER — OFFICE VISIT (OUTPATIENT)
Dept: WOUND CARE | Facility: CLINIC | Age: 44
End: 2024-12-18
Payer: COMMERCIAL

## 2024-12-18 PROCEDURE — 29581 APPL MULTLAYER CMPRN SYS LEG: CPT | Mod: LT

## 2024-12-23 ENCOUNTER — OFFICE VISIT (OUTPATIENT)
Dept: WOUND CARE | Facility: CLINIC | Age: 44
End: 2024-12-23
Payer: COMMERCIAL

## 2024-12-23 ENCOUNTER — APPOINTMENT (OUTPATIENT)
Dept: WOUND CARE | Facility: CLINIC | Age: 44
End: 2024-12-23
Payer: COMMERCIAL

## 2024-12-23 PROCEDURE — 99212 OFFICE O/P EST SF 10 MIN: CPT

## 2024-12-24 ENCOUNTER — APPOINTMENT (OUTPATIENT)
Dept: WOUND CARE | Facility: CLINIC | Age: 44
End: 2024-12-24
Payer: COMMERCIAL

## 2024-12-28 RX ORDER — METOPROLOL SUCCINATE 50 MG/1
50 TABLET, EXTENDED RELEASE ORAL DAILY
Qty: 90 TABLET | Refills: 0 | Status: SHIPPED | OUTPATIENT
Start: 2024-12-28

## 2025-01-08 ENCOUNTER — OFFICE VISIT (OUTPATIENT)
Dept: WOUND CARE | Facility: CLINIC | Age: 45
End: 2025-01-08
Payer: COMMERCIAL

## 2025-01-08 PROCEDURE — 11042 DBRDMT SUBQ TIS 1ST 20SQCM/<: CPT

## 2025-01-15 ENCOUNTER — OFFICE VISIT (OUTPATIENT)
Dept: WOUND CARE | Facility: CLINIC | Age: 45
End: 2025-01-15
Payer: COMMERCIAL

## 2025-01-15 PROCEDURE — 29581 APPL MULTLAYER CMPRN SYS LEG: CPT | Mod: LT

## 2025-01-22 ENCOUNTER — OFFICE VISIT (OUTPATIENT)
Dept: WOUND CARE | Facility: CLINIC | Age: 45
End: 2025-01-22
Payer: COMMERCIAL

## 2025-01-22 PROCEDURE — 29581 APPL MULTLAYER CMPRN SYS LEG: CPT | Mod: LT

## 2025-01-29 ENCOUNTER — OFFICE VISIT (OUTPATIENT)
Dept: WOUND CARE | Facility: CLINIC | Age: 45
End: 2025-01-29
Payer: COMMERCIAL

## 2025-02-04 ENCOUNTER — APPOINTMENT (OUTPATIENT)
Dept: PRIMARY CARE | Facility: CLINIC | Age: 45
End: 2025-02-04
Payer: COMMERCIAL

## 2025-02-05 ENCOUNTER — OFFICE VISIT (OUTPATIENT)
Dept: WOUND CARE | Facility: CLINIC | Age: 45
End: 2025-02-05
Payer: COMMERCIAL

## 2025-02-05 PROCEDURE — 11042 DBRDMT SUBQ TIS 1ST 20SQCM/<: CPT

## 2025-02-12 ENCOUNTER — APPOINTMENT (OUTPATIENT)
Dept: WOUND CARE | Facility: CLINIC | Age: 45
End: 2025-02-12
Payer: COMMERCIAL

## 2025-02-12 ENCOUNTER — OFFICE VISIT (OUTPATIENT)
Dept: WOUND CARE | Facility: CLINIC | Age: 45
End: 2025-02-12
Payer: COMMERCIAL

## 2025-02-12 PROCEDURE — 29581 APPL MULTLAYER CMPRN SYS LEG: CPT | Mod: LT

## 2025-02-19 ENCOUNTER — OFFICE VISIT (OUTPATIENT)
Dept: WOUND CARE | Facility: CLINIC | Age: 45
End: 2025-02-19
Payer: COMMERCIAL

## 2025-02-19 PROCEDURE — 29581 APPL MULTLAYER CMPRN SYS LEG: CPT | Mod: LT

## 2025-02-25 ENCOUNTER — HOSPITAL ENCOUNTER (OUTPATIENT)
Facility: HOSPITAL | Age: 45
Setting detail: OUTPATIENT SURGERY
Discharge: HOME | End: 2025-02-25
Attending: PODIATRIST | Admitting: PODIATRIST
Payer: COMMERCIAL

## 2025-02-25 VITALS
TEMPERATURE: 97.5 F | RESPIRATION RATE: 16 BRPM | DIASTOLIC BLOOD PRESSURE: 80 MMHG | OXYGEN SATURATION: 99 % | BODY MASS INDEX: 44.29 KG/M2 | HEIGHT: 67 IN | SYSTOLIC BLOOD PRESSURE: 145 MMHG | WEIGHT: 282.19 LBS | HEART RATE: 60 BPM

## 2025-02-25 PROCEDURE — 7100000009 HC PHASE TWO TIME - INITIAL BASE CHARGE: Performed by: PODIATRIST

## 2025-02-25 PROCEDURE — 2500000005 HC RX 250 GENERAL PHARMACY W/O HCPCS: Performed by: PODIATRIST

## 2025-02-25 PROCEDURE — 2720000007 HC OR 272 NO HCPCS: Performed by: PODIATRIST

## 2025-02-25 PROCEDURE — 7100000010 HC PHASE TWO TIME - EACH INCREMENTAL 1 MINUTE: Performed by: PODIATRIST

## 2025-02-25 PROCEDURE — 3600000007 HC OR TIME - EACH INCREMENTAL 1 MINUTE - PROCEDURE LEVEL TWO: Performed by: PODIATRIST

## 2025-02-25 PROCEDURE — 3600000002 HC OR TIME - INITIAL BASE CHARGE - PROCEDURE LEVEL TWO: Performed by: PODIATRIST

## 2025-02-25 PROCEDURE — 2780000003 HC OR 278 NO HCPCS: Performed by: PODIATRIST

## 2025-02-25 PROCEDURE — C1763 CONN TISS, NON-HUMAN: HCPCS | Performed by: PODIATRIST

## 2025-02-25 RX ORDER — SODIUM CHLORIDE 0.9 G/100ML
INJECTION, SOLUTION IRRIGATION AS NEEDED
Status: DISCONTINUED | OUTPATIENT
Start: 2025-02-25 | End: 2025-02-25 | Stop reason: HOSPADM

## 2025-02-25 ASSESSMENT — ENCOUNTER SYMPTOMS
CARDIOVASCULAR NEGATIVE: 1
ENDOCRINE NEGATIVE: 1
GASTROINTESTINAL NEGATIVE: 1
RESPIRATORY NEGATIVE: 1
CONSTITUTIONAL NEGATIVE: 1
NEUROLOGICAL NEGATIVE: 1
HEMATOLOGIC/LYMPHATIC NEGATIVE: 1
EYES NEGATIVE: 1
MUSCULOSKELETAL NEGATIVE: 1
PSYCHIATRIC NEGATIVE: 1
WOUND: 1
ALLERGIC/IMMUNOLOGIC NEGATIVE: 1

## 2025-02-25 ASSESSMENT — PAIN - FUNCTIONAL ASSESSMENT
PAIN_FUNCTIONAL_ASSESSMENT: 0-10
PAIN_FUNCTIONAL_ASSESSMENT: 0-10

## 2025-02-25 ASSESSMENT — PAIN SCALES - GENERAL
PAINLEVEL_OUTOF10: 0 - NO PAIN
PAINLEVEL_OUTOF10: 0 - NO PAIN

## 2025-02-25 NOTE — DISCHARGE INSTRUCTIONS
PODIATRIC SURGERY POST-OP INSTRUCTIONS    YOU HAD ANESTHESIA:  You must have a responsible adult drive you home and stay with you for the first 24 hours.    Do not drive a car or drink any alcohol for 24 hours after surgery.  Do not do any strenuous work or activity for the next 24 hours.  You may have mild nausea, a sore throat or raspy voice for a few days.        POST-OP INSTRUCTIONS:  Keep dressing clean, dry and intact until your first post-operative appointment.  Do not remove surgical dressing. You may need to loosen if necessary.   Do not shower unless using a cast protector to protect dressing.  If dressing gets wet, please call office immediately as this can lead to increased risk of infection or wound dehiscence.   Elevate surgical extremity as much as possible to help with pain and swelling.  Please resume all home medications.   Post-operative appointment with Dr. Smalls  for 1 week at [phone] . Please call to schedule if not already scheduled.  Should any problems, questions, or concerns arise, please call the clinic office at [phone] or [phone]     WHEN TO CALL YOUR DOCTOR:  Fever (>100.4°F or 38.0°C) or chills.  Incision problems such as redness, warmth, swelling, or foul smelling drainage.  Severe nausea or persistent vomiting.  Pain and swelling in your legs, especially if it is only on one side and not the other.  Pain with urination, cloudy urine, or foul smelling urine.  Or if you have any other problems or questions.  CALL 911 or go to the ED if you have any shortness of breath, difficulty breathing, or chest pain.

## 2025-02-25 NOTE — H&P
History Of Present Illness  Chris Chance is a 44 y.o. male presenting with for graft placement to left calf wound. Patient has been following up regularly at the wound center. Patient denies CP, SOB, fever/chills or N/V/D. Patient denies pain to the left lower extremity. No further complaints. .     Past Medical History  Past Medical History:   Diagnosis Date    Abnormal levels of other serum enzymes     Elevated liver enzymes    Lateral epicondylitis, right elbow     Right tennis elbow    Other hypertrophic disorders of the skin     Skin tag    Personal history of other diseases of the nervous system and sense organs     History of serous otitis media    Personal history of other diseases of the respiratory system     History of bronchitis    Personal history of other specified conditions     History of abdominal pain    Plantar fascial fibromatosis     Plantar fasciitis    Unspecified asthma, uncomplicated (HHS-HCC)     Asthmatic bronchitis       Surgical History  Past Surgical History:   Procedure Laterality Date    OTHER SURGICAL HISTORY  09/22/2015    History Of Prior Surgery        Social History  He reports that he has never smoked. He has never used smokeless tobacco. He reports that he does not drink alcohol and does not use drugs.    Family History  No family history on file.     Allergies  Patient has no known allergies.    Review of Systems   Constitutional: Negative.    HENT: Negative.     Eyes: Negative.    Respiratory: Negative.     Cardiovascular: Negative.    Gastrointestinal: Negative.    Endocrine: Negative.    Genitourinary: Negative.    Musculoskeletal: Negative.    Skin:  Positive for wound.   Allergic/Immunologic: Negative.    Neurological: Negative.    Hematological: Negative.    Psychiatric/Behavioral: Negative.          Physical Exam  Constitutional:       Appearance: He is obese.   HENT:      Head: Normocephalic and atraumatic.      Nose: Nose normal.      Mouth/Throat:      Mouth:  "Mucous membranes are dry.      Pharynx: Oropharynx is clear.   Eyes:      Extraocular Movements: Extraocular movements intact.      Pupils: Pupils are equal, round, and reactive to light.   Cardiovascular:      Rate and Rhythm: Normal rate and regular rhythm.      Pulses: Normal pulses.      Heart sounds: Normal heart sounds.   Pulmonary:      Effort: Pulmonary effort is normal.      Breath sounds: Normal breath sounds.   Abdominal:      General: Abdomen is flat. Bowel sounds are normal.      Palpations: Abdomen is soft.   Musculoskeletal:      Comments: Left calf wound   Skin:     Capillary Refill: Capillary refill takes less than 2 seconds.      Comments: Left calf wound   Neurological:      Mental Status: He is alert and oriented to person, place, and time. Mental status is at baseline.          Last Recorded Vitals  Blood pressure 127/79, pulse 66, temperature 36.5 °C (97.7 °F), temperature source Temporal, resp. rate 18, height 1.702 m (5' 7\"), weight 128 kg (282 lb 3 oz), SpO2 99%.    Relevant Results           Assessment/Plan   Assessment & Plan      #Chronic non-pressure ulceration, left calf  #HTN  #Asthma  #MICHAEL  #HLD        Plan for OR today for myriad graft placement, left calf wound. No need for anesthesia, as wound is not painful. Patient declines local anesthesia           Eric Carbone DPM PGY-1    "

## 2025-02-25 NOTE — OP NOTE
LEFT CALF DEBRIDEMENT WITH MYRIAD GRAFT (L) Operative Note     Date: 2025  OR Location: [facility]    Name: Chris Chance, : 1980, Age: 44 y.o., MRN: [id number], Sex: male    Diagnosis  Pre-op Diagnosis      * Ulcer of toe of left foot, with necrosis of muscle (Multi) [L97.523] Post-op Diagnosis     * Ulcer of toe of left foot, with necrosis of muscle (Multi) [L97.523]     Procedures  LEFT CALF DEBRIDEMENT WITH MYRIAD GRAFT  95201 - [place] SPLT AGRFT F/S/N/H/F/G/M/DGT 1ST 100 SQCM/</1%      Surgeons      * Libra Smalls - Primary    Resident/Fellow/Other Assistant:  Surgeons and Role:  * No surgeons found with a matching role *    Staff:   Shiraulator: Laila  Circulator: Rosie Ford Person: Airam    Anesthesia Staff: No anesthesia staff entered.    Procedure Summary  Anesthesia: Anesthesia type not filed in the log.  ASA: ASA status not filed in the log.  Estimated Blood Loss: 0mL  Intra-op Medications: * Intraprocedure medication information is unavailable because the case start and end events have not been set *      Intraprocedure I/O Totals       None           Specimen: No specimens collected              Drains and/or Catheters: * None in log *    Tourniquet Times:         Implants:  Implants       Type Name Action Serial No.      Implant GRAFT, MYRIAD, MATRIX, 5 X 5, 3-LAYER 1MM THIN - [id number] Implanted               Findings: CVU left calf    Indications: Chris Chance is an 44 y.o. male who is having surgery for L97.523.     The patient was seen in the preoperative area. The risks, benefits, complications, treatment options, non-operative alternatives, expected recovery and outcomes were discussed with the patient. The possibilities of reaction to medication, pulmonary aspiration, injury to surrounding structures, bleeding, recurrent infection, the need for additional procedures, failure to diagnose a condition, and creating a complication requiring transfusion or operation were  discussed with the patient. The patient concurred with the proposed plan, giving informed consent.  The site of surgery was properly noted/marked if necessary per policy. The patient has been actively warmed in preoperative area. Preoperative antibiotics are not indicated. Venous thrombosis prophylaxis are not indicated.    Procedure Details: 4-year-old white male well-known to me has a chronic venous ulcer on his left calf which is undergone multiple grafts this wound is now stalled but is much smaller in size and recommending a repeat graft he is agreeable consent form done patient taken the OR placed on table in prone position left leg was prepped scrubbed draped usual aseptic manner.    Attention was then directed to the left calf where 3 island clusters of full-thickness venous ulcers were noted and debrided utilizing curette through the subfascial layer removing on nonviable subcutaneous tissue the wound is then flushed with normal saline after copious irrigation and debridement 5 x 5 cm myriad graft was then applied and fixated with Steri-Strips a multilayer compression dressing was then applied.    Patient taken to  via stable good condition tolerated procedure and local well he will be discharged today I will see him in the wound center in 1 week for follow-up.  Complications:  None; patient tolerated the procedure well.    Disposition: PACU - hemodynamically stable.  Condition: stable                 Additional Details:     Attending Attestation: I was present and scrubbed for the entire procedure.    Libra Smalls  Phone Number: [phone]

## 2025-02-26 ENCOUNTER — APPOINTMENT (OUTPATIENT)
Dept: WOUND CARE | Facility: CLINIC | Age: 45
End: 2025-02-26
Payer: COMMERCIAL

## 2025-03-05 ENCOUNTER — OFFICE VISIT (OUTPATIENT)
Dept: WOUND CARE | Facility: CLINIC | Age: 45
End: 2025-03-05
Payer: COMMERCIAL

## 2025-03-05 PROCEDURE — 29581 APPL MULTLAYER CMPRN SYS LEG: CPT | Mod: LT

## 2025-03-12 ENCOUNTER — CLINICAL SUPPORT (OUTPATIENT)
Dept: WOUND CARE | Facility: CLINIC | Age: 45
End: 2025-03-12
Payer: COMMERCIAL

## 2025-03-12 PROCEDURE — 29581 APPL MULTLAYER CMPRN SYS LEG: CPT | Mod: LT

## 2025-03-19 ENCOUNTER — OFFICE VISIT (OUTPATIENT)
Dept: WOUND CARE | Facility: CLINIC | Age: 45
End: 2025-03-19
Payer: COMMERCIAL

## 2025-03-19 PROCEDURE — 29581 APPL MULTLAYER CMPRN SYS LEG: CPT | Mod: LT

## 2025-03-26 ENCOUNTER — OFFICE VISIT (OUTPATIENT)
Dept: WOUND CARE | Facility: CLINIC | Age: 45
End: 2025-03-26
Payer: COMMERCIAL

## 2025-03-26 PROCEDURE — 29581 APPL MULTLAYER CMPRN SYS LEG: CPT | Mod: LT

## 2025-03-27 DIAGNOSIS — E55.9 VITAMIN D DEFICIENCY: ICD-10-CM

## 2025-03-27 RX ORDER — AMPICILLIN TRIHYDRATE 500 MG
50 CAPSULE ORAL DAILY
Qty: 180 CAPSULE | Refills: 0 | OUTPATIENT
Start: 2025-03-27

## 2025-04-02 ENCOUNTER — OFFICE VISIT (OUTPATIENT)
Dept: WOUND CARE | Facility: CLINIC | Age: 45
End: 2025-04-02
Payer: COMMERCIAL

## 2025-04-09 ENCOUNTER — OFFICE VISIT (OUTPATIENT)
Dept: WOUND CARE | Facility: CLINIC | Age: 45
End: 2025-04-09
Payer: COMMERCIAL

## 2025-04-09 PROCEDURE — 29581 APPL MULTLAYER CMPRN SYS LEG: CPT | Mod: LT

## 2025-04-16 ENCOUNTER — OFFICE VISIT (OUTPATIENT)
Dept: WOUND CARE | Facility: CLINIC | Age: 45
End: 2025-04-16
Payer: COMMERCIAL

## 2025-04-16 PROCEDURE — 29581 APPL MULTLAYER CMPRN SYS LEG: CPT | Mod: LT

## 2025-04-23 ENCOUNTER — OFFICE VISIT (OUTPATIENT)
Dept: WOUND CARE | Facility: CLINIC | Age: 45
End: 2025-04-23
Payer: COMMERCIAL

## 2025-04-23 PROCEDURE — 99213 OFFICE O/P EST LOW 20 MIN: CPT

## 2025-04-28 ENCOUNTER — OFFICE VISIT (OUTPATIENT)
Dept: WOUND CARE | Facility: CLINIC | Age: 45
End: 2025-04-28
Payer: COMMERCIAL

## 2025-04-28 DIAGNOSIS — L97.922 NON-PRESSURE ULCER OF LEFT LOWER EXTREMITY WITH FAT LAYER EXPOSED: Primary | ICD-10-CM

## 2025-04-28 DIAGNOSIS — L03.116 CELLULITIS OF LEFT LOWER EXTREMITY: ICD-10-CM

## 2025-04-28 PROCEDURE — 11042 DBRDMT SUBQ TIS 1ST 20SQCM/<: CPT

## 2025-04-28 PROCEDURE — 11045 DBRDMT SUBQ TISS EACH ADDL: CPT

## 2025-05-04 LAB
BACTERIA SPEC AEROBE CULT: ABNORMAL
BACTERIA SPEC ANAEROBE CULT: ABNORMAL

## 2025-05-07 ENCOUNTER — OFFICE VISIT (OUTPATIENT)
Dept: WOUND CARE | Facility: CLINIC | Age: 45
End: 2025-05-07
Payer: COMMERCIAL

## 2025-05-07 DIAGNOSIS — L97.922 NON-PRESSURE ULCER OF LEFT LOWER EXTREMITY WITH FAT LAYER EXPOSED: ICD-10-CM

## 2025-05-07 DIAGNOSIS — I87.2 PERIPHERAL VENOUS INSUFFICIENCY: Primary | ICD-10-CM

## 2025-05-07 PROCEDURE — 29581 APPL MULTLAYER CMPRN SYS LEG: CPT | Mod: LT

## 2025-05-14 ENCOUNTER — OFFICE VISIT (OUTPATIENT)
Dept: WOUND CARE | Facility: CLINIC | Age: 45
End: 2025-05-14
Payer: COMMERCIAL

## 2025-05-14 PROCEDURE — 29581 APPL MULTLAYER CMPRN SYS LEG: CPT | Mod: LT

## 2025-05-21 ENCOUNTER — OFFICE VISIT (OUTPATIENT)
Dept: WOUND CARE | Facility: CLINIC | Age: 45
End: 2025-05-21
Payer: COMMERCIAL

## 2025-05-21 DIAGNOSIS — L97.922 NON-PRESSURE ULCER OF LEFT LOWER EXTREMITY WITH FAT LAYER EXPOSED: Primary | ICD-10-CM

## 2025-05-21 DIAGNOSIS — I87.2 PERIPHERAL VENOUS INSUFFICIENCY: ICD-10-CM

## 2025-05-21 PROCEDURE — 11042 DBRDMT SUBQ TIS 1ST 20SQCM/<: CPT

## 2025-05-21 PROCEDURE — 11045 DBRDMT SUBQ TISS EACH ADDL: CPT

## 2025-05-24 LAB
BACTERIA SPEC AEROBE CULT: ABNORMAL
BACTERIA SPEC ANAEROBE CULT: ABNORMAL

## 2025-05-27 LAB
BACTERIA SPEC AEROBE CULT: ABNORMAL
BACTERIA SPEC ANAEROBE CULT: ABNORMAL

## 2025-05-28 ENCOUNTER — OFFICE VISIT (OUTPATIENT)
Dept: WOUND CARE | Facility: CLINIC | Age: 45
End: 2025-05-28
Payer: COMMERCIAL

## 2025-05-28 PROCEDURE — 99213 OFFICE O/P EST LOW 20 MIN: CPT

## 2025-06-04 ENCOUNTER — HOSPITAL ENCOUNTER (OUTPATIENT)
Dept: VASCULAR MEDICINE | Facility: CLINIC | Age: 45
Discharge: HOME | End: 2025-06-04
Payer: COMMERCIAL

## 2025-06-04 ENCOUNTER — OFFICE VISIT (OUTPATIENT)
Dept: WOUND CARE | Facility: CLINIC | Age: 45
End: 2025-06-04
Payer: COMMERCIAL

## 2025-06-04 DIAGNOSIS — I87.2 PERIPHERAL VENOUS INSUFFICIENCY: ICD-10-CM

## 2025-06-04 DIAGNOSIS — L97.922 NON-PRESSURE ULCER OF LEFT LOWER EXTREMITY WITH FAT LAYER EXPOSED: ICD-10-CM

## 2025-06-04 PROCEDURE — 93970 EXTREMITY STUDY: CPT | Performed by: INTERNAL MEDICINE

## 2025-06-04 PROCEDURE — 93970 EXTREMITY STUDY: CPT

## 2025-06-04 PROCEDURE — 99213 OFFICE O/P EST LOW 20 MIN: CPT | Mod: 25

## 2025-06-11 ENCOUNTER — OFFICE VISIT (OUTPATIENT)
Dept: WOUND CARE | Facility: CLINIC | Age: 45
End: 2025-06-11
Payer: COMMERCIAL

## 2025-06-11 PROCEDURE — 29581 APPL MULTLAYER CMPRN SYS LEG: CPT | Mod: LT

## 2025-06-19 ENCOUNTER — OFFICE VISIT (OUTPATIENT)
Dept: WOUND CARE | Facility: CLINIC | Age: 45
End: 2025-06-19
Payer: COMMERCIAL

## 2025-06-19 PROCEDURE — 29581 APPL MULTLAYER CMPRN SYS LEG: CPT | Mod: LT

## 2025-06-19 PROCEDURE — 99204 OFFICE O/P NEW MOD 45 MIN: CPT | Performed by: SURGERY

## 2025-06-25 ENCOUNTER — OFFICE VISIT (OUTPATIENT)
Dept: WOUND CARE | Facility: CLINIC | Age: 45
End: 2025-06-25
Payer: COMMERCIAL

## 2025-06-25 ENCOUNTER — APPOINTMENT (OUTPATIENT)
Dept: WOUND CARE | Facility: CLINIC | Age: 45
End: 2025-06-25
Payer: COMMERCIAL

## 2025-06-25 PROCEDURE — 99213 OFFICE O/P EST LOW 20 MIN: CPT

## 2025-07-02 ENCOUNTER — OFFICE VISIT (OUTPATIENT)
Dept: WOUND CARE | Facility: CLINIC | Age: 45
End: 2025-07-02
Payer: COMMERCIAL

## 2025-07-02 DIAGNOSIS — I10 BENIGN ESSENTIAL HYPERTENSION: ICD-10-CM

## 2025-07-02 PROCEDURE — 99213 OFFICE O/P EST LOW 20 MIN: CPT

## 2025-07-02 RX ORDER — METOPROLOL SUCCINATE 25 MG/1
TABLET, EXTENDED RELEASE ORAL
Qty: 90 TABLET | Refills: 0 | OUTPATIENT
Start: 2025-07-02

## 2025-07-08 ENCOUNTER — PREP FOR PROCEDURE (OUTPATIENT)
Dept: VASCULAR SURGERY | Facility: CLINIC | Age: 45
End: 2025-07-08
Payer: COMMERCIAL

## 2025-07-08 DIAGNOSIS — I87.332 CHRONIC VENOUS HYPERTENSION (IDIOPATHIC) WITH ULCER AND INFLAMMATION OF LEFT LOWER EXTREMITY: Primary | ICD-10-CM

## 2025-07-08 DIAGNOSIS — I87.2 VENOUS INSUFFICIENCY (CHRONIC) (PERIPHERAL): ICD-10-CM

## 2025-07-16 ENCOUNTER — PRE-ADMISSION TESTING (OUTPATIENT)
Dept: PREADMISSION TESTING | Facility: HOSPITAL | Age: 45
End: 2025-07-16
Payer: COMMERCIAL

## 2025-07-16 ENCOUNTER — OFFICE VISIT (OUTPATIENT)
Dept: WOUND CARE | Facility: CLINIC | Age: 45
End: 2025-07-16
Payer: COMMERCIAL

## 2025-07-16 VITALS
HEART RATE: 73 BPM | TEMPERATURE: 96.4 F | DIASTOLIC BLOOD PRESSURE: 90 MMHG | RESPIRATION RATE: 18 BRPM | HEIGHT: 67 IN | OXYGEN SATURATION: 97 % | WEIGHT: 315 LBS | SYSTOLIC BLOOD PRESSURE: 140 MMHG | BODY MASS INDEX: 49.44 KG/M2

## 2025-07-16 DIAGNOSIS — I87.2 VENOUS INSUFFICIENCY (CHRONIC) (PERIPHERAL): ICD-10-CM

## 2025-07-16 DIAGNOSIS — I87.332 CHRONIC VENOUS HYPERTENSION (IDIOPATHIC) WITH ULCER AND INFLAMMATION OF LEFT LOWER EXTREMITY: ICD-10-CM

## 2025-07-16 LAB
APTT PPP: 32 SECONDS (ref 26–36)
ERYTHROCYTE [DISTWIDTH] IN BLOOD BY AUTOMATED COUNT: 15 % (ref 11.5–14.5)
HCT VFR BLD AUTO: 44.1 % (ref 41–52)
HGB BLD-MCNC: 15.1 G/DL (ref 13.5–17.5)
INR PPP: 1 (ref 0.9–1.1)
MCH RBC QN AUTO: 33.1 PG (ref 26–34)
MCHC RBC AUTO-ENTMCNC: 34.2 G/DL (ref 32–36)
MCV RBC AUTO: 97 FL (ref 80–100)
NRBC BLD-RTO: 0 /100 WBCS (ref 0–0)
PLATELET # BLD AUTO: 217 X10*3/UL (ref 150–450)
PROTHROMBIN TIME: 10.8 SECONDS (ref 9.8–12.4)
RBC # BLD AUTO: 4.56 X10*6/UL (ref 4.5–5.9)
WBC # BLD AUTO: 7.7 X10*3/UL (ref 4.4–11.3)

## 2025-07-16 PROCEDURE — 85027 COMPLETE CBC AUTOMATED: CPT | Performed by: SURGERY

## 2025-07-16 PROCEDURE — 99213 OFFICE O/P EST LOW 20 MIN: CPT

## 2025-07-16 PROCEDURE — 85730 THROMBOPLASTIN TIME PARTIAL: CPT | Performed by: SURGERY

## 2025-07-16 PROCEDURE — 99204 OFFICE O/P NEW MOD 45 MIN: CPT | Performed by: NURSE PRACTITIONER

## 2025-07-16 PROCEDURE — 93005 ELECTROCARDIOGRAM TRACING: CPT

## 2025-07-16 ASSESSMENT — DUKE ACTIVITY SCORE INDEX (DASI)
CAN YOU DO YARD WORK LIKE RAKING LEAVES, WEEDING OR PUSHING A MOWER: YES
CAN YOU WALK A BLOCK OR TWO ON LEVEL GROUND: YES
CAN YOU DO HEAVY WORK AROUND THE HOUSE LIKE SCRUBBING FLOORS OR LIFTING AND MOVING HEAVY FURNITURE: YES
CAN YOU TAKE CARE OF YOURSELF (EAT, DRESS, BATHE, OR USE TOILET): YES
CAN YOU RUN A SHORT DISTANCE: YES
CAN YOU DO LIGHT WORK AROUND THE HOUSE LIKE DUSTING OR WASHING DISHES: YES
TOTAL_SCORE: 58.2
CAN YOU PARTICIPATE IN STRENOUS SPORTS LIKE SWIMMING, SINGLES TENNIS, FOOTBALL, BASKETBALL, OR SKIING: YES
CAN YOU HAVE SEXUAL RELATIONS: YES
CAN YOU DO MODERATE WORK AROUND THE HOUSE LIKE VACUUMING, SWEEPING FLOORS OR CARRYING GROCERIES: YES
CAN YOU CLIMB A FLIGHT OF STAIRS OR WALK UP A HILL: YES
CAN YOU WALK INDOORS, SUCH AS AROUND YOUR HOUSE: YES
DASI METS SCORE: 9.9
CAN YOU PARTICIPATE IN MODERATE RECREATIONAL ACTIVITIES LIKE GOLF, BOWLING, DANCING, DOUBLES TENNIS OR THROWING A BASEBALL OR FOOTBALL: YES

## 2025-07-16 ASSESSMENT — PAIN - FUNCTIONAL ASSESSMENT: PAIN_FUNCTIONAL_ASSESSMENT: 0-10

## 2025-07-16 ASSESSMENT — PAIN SCALES - GENERAL: PAINLEVEL_OUTOF10: 0 - NO PAIN

## 2025-07-16 NOTE — H&P (VIEW-ONLY)
"CPM/PAT Evaluation       Name: Chris Chance (Chris Chance)  /Age: 1980/44 y.o.     In-Person       Chief Complaint:     44 yr old male presents to [facility] for pre-operative evaluation, with c/o venous hypertension with inflammation of LLE  LEFT GREATER SAPHENOUS VEIN RADIOFREQUENCY ABLATION  is Scheduled on 2025 with Dr. Marshall    The patient has the following past medical history:  HTN/HLD, obesity, cellulitis of LLE, asthma/bronchitis/ MICHAEL    Chief complaint:  He c/o chronic wound to left calf, states he \"may have hit it at work and due to lifestyle.\"    He states he had a \"wound that opened up with drainage,\" since 2023 following Dr. Smalls  Reports the wound has healed and then opened up again---> goes to wound clinic weekly  States he does a dressing change at home with cream application with 4 x 4, ace wrap x 2 and tubi socks  He reports intermittent itching  States his wound recently opened or dry skin coming off since last visit on --->reports he has a \"small amount yellow green ooze on the bandage\"  Denies bleeding and denies pain    He states he has had 7 grafts, with last s/p left calf debridement with graft, done 2025    PCP-Dr. ENRIKE Avila, LOV 2025  due 2025    Denies fever, chills or nausea.   Denies any past issues with anesthesia.        Vitals:    25 0920   BP: 140/90   Pulse: 73   Resp: 18   Temp: 35.8 °C (96.4 °F)   SpO2: 97%          Medical History[1]    Surgical History[2]    Patient Sexual activity questions deferred to the physician.    Family History[3]    Allergies[4]    Prior to Admission medications   Medication Sig Start Date End Date Taking? Authorizing Provider   acetaminophen (Tylenol) 325 mg tablet Take 2 tablets (650 mg) by mouth every 4 hours if needed for fever (temp greater than 38.0 C) (pain). 11/10/23   Erik Avila, DO   albuterol 90 mcg/actuation inhaler Inhale 2 puffs every 6 hours if needed for wheezing or shortness " of breath. 10/2/23   May Zarate, APRN-CNP   cholecalciferol (Vitamin D-3) 25 MCG (1000 UT) capsule Take 2 capsules (50 mcg) by mouth once daily. 2/27/24 2/11/27  Erik Avila DO   fluticasone propion-salmeteroL (Advair Diskus) 250-50 mcg/dose diskus inhaler Inhale 1 puff 2 times a day. Rinse mouth with water after use to reduce aftertaste and incidence of candidiasis. Do not swallow. 11/10/23 11/9/24  Erik Avila DO   furosemide (Lasix) 20 mg tablet TAKE ONE TABLET (20mg) BY MOUTH once EVERY DAY 12/2/24   Erik Avila DO   Lactobacillus acidophilus 1 billion cell capsule Take 1 tablet by mouth once daily. Unsure of dose    Historical Provider, MD   magnesium oxide (Mag-Ox) 400 mg (241.3 mg magnesium) tablet Take 1 tablet (400 mg) by mouth once daily. 11/10/23   Erik Avila DO   metoprolol succinate XL (Toprol-XL) 25 mg 24 hr tablet Take 1 tablet (25 mg) by mouth once daily. Do not crush or chew. 10/29/24   Erik Avila DO   metoprolol succinate XL (Toprol-XL) 50 mg 24 hr tablet TAKE 1 TABLET BY MOUTH ONCE DAILY. DO NOT CRUSH OR CHEW. 12/28/24   Sadie Brown MD   metoprolol succinate XL (Toprol-XL) 50 mg 24 hr tablet Take 1 tablet (50 mg) by mouth once daily. DO NOT CRUSH OR CHEW 12/5/24   Erik Avila DO   Symbicort 160-4.5 mcg/actuation inhaler Inhale 2 puffs 2 times a day. 10/6/23   Historical Provider, MD      Review of Systems  Constitutional: NO F, chills, or sweats  Eyes: no blurred vision or visual disturbance  ENT: denies congestion, sore throat, difficulty hearing  Cardiovascular: no chest pain, no edema, no palps and no syncope.   Respiratory: BRUNER, with inhaler PRN, last used a couple weeks ago d/t pollen,  no cough,no s.o.b. and no wheezing  Gastrointestinal: no abdominal pain, no N/V, no blood in stools  Genitourinary: no dysuria, no urinary frequency, no urinary hesitancy and no feelings of urinary urgency.   Musculoskeletal: no  "arthralgias,  no back pain and no myalgias.   Integumentary: see HPI, no new skin lesions and no rashes.   Neurological: no difficulty walking, no headache, no limb weakness, no numbness and no tingling.   Endocrine: no recent weight gain and no recent weight loss.   Hematologic/Lymphatic: no tendency for easy bruising and no swollen glands.      Physical Exam  Constitutional:       Appearance: Normal appearance. He is obese.     Cardiovascular:      Rate and Rhythm: Normal rate.      Heart sounds: Normal heart sounds.   Pulmonary:      Effort: Pulmonary effort is normal.      Breath sounds: Normal breath sounds.   Abdominal:      General: Bowel sounds are normal.      Palpations: Abdomen is soft.     Musculoskeletal:         General: Normal range of motion.      Cervical back: Normal range of motion.      Right lower leg: No edema.      Left lower leg: No edema.     Skin:     General: Skin is warm and dry.      Comments: Tubi hose bilaterally, unable to view/ assess wound     Neurological:      Mental Status: He is alert and oriented to person, place, and time.          PAT AIRWAY:   Airway:     Mallampati::  IV    TM distance::  <3 FB    Neck ROM::  Full  normal        Visit Vitals  /90   Pulse 73   Temp 35.8 °C (96.4 °F) (Tympanic)   Resp 18   Ht 1.702 m (5' 7\")   Wt 149 kg (328 lb 14.8 oz)   SpO2 97%   BMI 51.52 kg/m²   Smoking Status Never   BSA 2.65 m²       DASI Risk Score      Flowsheet Row Pre-Admission Testing from 7/16/2025 in [facility]   Can you take care of yourself (eat, dress, bathe, or use toilet)?  2.75 filed at 07/16/2025 0846   Can you walk indoors, such as around your house? 1.75 filed at 07/16/2025 0846   Can you walk a block or two on level ground?  2.75 filed at 07/16/2025 0846   Can you climb a flight of stairs or walk up a hill? 5.5 filed at 07/16/2025 0846   Can you run a short distance? 8 filed at 07/16/2025 0846   Can you do light work around the house like dusting or " washing dishes? 2.7 filed at 07/16/2025 0846   Can you do moderate work around the house like vacuuming, sweeping floors or carrying groceries? 3.5 filed at 07/16/2025 0846   Can you do heavy work around the house like scrubbing floors or lifting and moving heavy furniture?  8 filed at 07/16/2025 0846   Can you do yard work like raking leaves, weeding or pushing a mower? 4.5 filed at 07/16/2025 0846   Can you have sexual relations? 5.25 filed at 07/16/2025 0846   Can you participate in moderate recreational activities like golf, bowling, dancing, doubles tennis or throwing a baseball or football? 6 filed at 07/16/2025 0846   Can you participate in strenous sports like swimming, singles tennis, football, basketball, or skiing? 7.5 filed at 07/16/2025 0846   DASI SCORE 58.2 filed at 07/16/2025 0846   METS Score (Will be calculated only when all the questions are answered) 9.9 filed at 07/16/2025 0846     Caprini DVT Assessment      Flowsheet Row ED to Hosp-Admission (Discharged) from 10/19/2023 in [facility] 3 with Erik Avila DO and Martin Piedra DO   DVT Score (IF A SCORE IS NOT CALCULATING, MUST SELECT A BMI TO COMPLETE) 5 filed at 10/19/2023 1354   BMI (BMI MUST BE CHOSEN) Greater than 50 (Venous stasis syndrome) filed at 10/19/2023 1354   RETIRED: Current Status Swollen legs filed at 10/19/2023 1354   RETIRED: Age 40-59 years filed at 10/19/2023 1354     Modified Frailty Index    No data to display  [id number] Stroke Risk Points  Current as of just now        N/A 0 to 9 Points:      Last Change: N/A          The [id number] risk score (Lip JAMIE, et al. 2009. © 2010 [employer]) quantifies the risk of stroke for a patient with atrial fibrillation. For patients without atrial fibrillation or under the age of 18 this score appears as N/A. Higher score values generally indicate higher risk of stroke.        This score is not applicable to this patient. Components are  not calculated.          Revised Cardiac Risk Index    No data to display  Apfel Simplified Score    No data to display  Risk Analysis Index Results This Encounter    No data found in the last 10 encounters.       Stop Bang Score      Flowsheet Row Pre-Admission Testing from 7/16/20[facility] in [facility]   Do you snore loudly? 1 filed at 07/16/2025 0927   Do you often feel tired or fatigued after your sleep? 0 filed at 07/16/2025 0927   Has anyone ever observed you stop breathing in your sleep? 1 filed at 07/16/2025 0927   Do you have or are you being treated for high blood pressure? 1 filed at 07/16/2025 0927   Recent BMI (Calculated) 51.5 filed at 07/16/2025 0927   Is BMI greater than 35 kg/m2? 1=Yes filed at 07/16/2025 0927   Age older than 50 years old? 0=No filed at 07/16/2025 0927   Is your neck circumference greater than 17 inches (Male) or 16 inches (Female)? 1 filed at 07/16/2025 0927   Gender - Male 1=Yes filed at 07/16/2025 0927   STOP-BANG Total Score 6 filed at 07/16/2025 0927     Prodigy: High Risk  Total Score: 8              Prodigy Gender Score          ARISCAT Score for Postoperative Pulmonary Complications    No data to display  Jiménez Perioperative Risk for Myocardial Infarction or Cardiac Arrest (CLAU)    No data to display      ASSESSMENT  Obesity   BMI 44.20     HTN/HLD  Takes Lasix, metoprolol  ECG 7/16/2025- NSR, 72 bpm    asthma/bronchitis/ MICHAEL  Uses inhaler PRN  + BRUNER, diminished LS bilaterally from mid lobe-   Not treating MICHAEL currently, states was intolerant to MICHAEL    Cellulitis   Plan for  left SVG ablation as scheduled       ANESTHESIA FINDINGS:  Intubation History: No history of difficult intubation  Significant Anesthesia Considerations:      Airway History: No abnormal airway history  Predictors of Difficult Airway Management  ? STOP BANG- 6, + MICHAEL/ untreated  ? Obesity  ? Short thick neck        CONSULTS:    Patient does not require consults for optimization at this time.      The Following Tests/Procedures Have Been Initiated and Reviewed/ interpreted by me:   CBC, Coag screen,  ECG  CMP reviewed from 6/19/2025      Planned Anesthetic: MAC     Instructions Given to Patient:  *Reviewed Medications to be taken in AM of surgery or held  Patient given verbal and written preop instructions and voices comprehension and compliance.     No further testing required.      PLAN  This patient is prepared for surgery.           [1]   Past Medical History:  Diagnosis Date    Abnormal levels of other serum enzymes     Elevated liver enzymes    Hypertension     Lateral epicondylitis, right elbow     Right tennis elbow    Other hypertrophic disorders of the skin     Skin tag    Personal history of other diseases of the nervous system and sense organs     History of serous otitis media    Personal history of other diseases of the respiratory system     History of bronchitis    Personal history of other specified conditions     History of abdominal pain    Plantar fascial fibromatosis     Plantar fasciitis    Sleep apnea     Unspecified asthma, uncomplicated (HHS-HCC)     Asthmatic bronchitis   [2]   Past Surgical History:  Procedure Laterality Date    OTHER SURGICAL HISTORY  09/22/2015    History Of Prior Surgery    SKIN GRAFT     [3]   Family History  Problem Relation Name Age of Onset    Lung disease Father     [4] No Known Allergies

## 2025-07-16 NOTE — CPM/PAT H&P
"CPM/PAT Evaluation       Name: Chris Chance (Chris Chance)  /Age: 1980/44 y.o.     In-Person       Chief Complaint:     44 yr old male presents to [facility] for pre-operative evaluation, with c/o venous hypertension with inflammation of LLE  LEFT GREATER SAPHENOUS VEIN RADIOFREQUENCY ABLATION  is Scheduled on 2025 with Dr. Marshall    The patient has the following past medical history:  HTN/HLD, obesity, cellulitis of LLE, asthma/bronchitis/ MICHAEL    Chief complaint:  He c/o chronic wound to left calf, states he \"may have hit it at work and due to lifestyle.\"    He states he had a \"wound that opened up with drainage,\" since 2023 following Dr. Smalls  Reports the wound has healed and then opened up again---> goes to wound clinic weekly  States he does a dressing change at home with cream application with 4 x 4, ace wrap x 2 and tubi socks  He reports intermittent itching  States his wound recently opened or dry skin coming off since last visit on --->reports he has a \"small amount yellow green ooze on the bandage\"  Denies bleeding and denies pain    He states he has had 7 grafts, with last s/p left calf debridement with graft, done 2025    PCP-Dr. ENRIKE Avila, LOV 2025  due 2025    Denies fever, chills or nausea.   Denies any past issues with anesthesia.        Vitals:    25 0920   BP: 140/90   Pulse: 73   Resp: 18   Temp: 35.8 °C (96.4 °F)   SpO2: 97%          Medical History[1]    Surgical History[2]    Patient Sexual activity questions deferred to the physician.    Family History[3]    Allergies[4]    Prior to Admission medications   Medication Sig Start Date End Date Taking? Authorizing Provider   acetaminophen (Tylenol) 325 mg tablet Take 2 tablets (650 mg) by mouth every 4 hours if needed for fever (temp greater than 38.0 C) (pain). 11/10/23   Erik Avila, DO   albuterol 90 mcg/actuation inhaler Inhale 2 puffs every 6 hours if needed for wheezing or shortness " of breath. 10/2/23   May Zaarte, APRN-CNP   cholecalciferol (Vitamin D-3) 25 MCG (1000 UT) capsule Take 2 capsules (50 mcg) by mouth once daily. 2/27/24 2/11/27  Erik Avila DO   fluticasone propion-salmeteroL (Advair Diskus) 250-50 mcg/dose diskus inhaler Inhale 1 puff 2 times a day. Rinse mouth with water after use to reduce aftertaste and incidence of candidiasis. Do not swallow. 11/10/23 11/9/24  Erik Avila DO   furosemide (Lasix) 20 mg tablet TAKE ONE TABLET (20mg) BY MOUTH once EVERY DAY 12/2/24   Erik Avila DO   Lactobacillus acidophilus 1 billion cell capsule Take 1 tablet by mouth once daily. Unsure of dose    Historical Provider, MD   magnesium oxide (Mag-Ox) 400 mg (241.3 mg magnesium) tablet Take 1 tablet (400 mg) by mouth once daily. 11/10/23   Erik Avila DO   metoprolol succinate XL (Toprol-XL) 25 mg 24 hr tablet Take 1 tablet (25 mg) by mouth once daily. Do not crush or chew. 10/29/24   Erik Avila DO   metoprolol succinate XL (Toprol-XL) 50 mg 24 hr tablet TAKE 1 TABLET BY MOUTH ONCE DAILY. DO NOT CRUSH OR CHEW. 12/28/24   Sadie Brown MD   metoprolol succinate XL (Toprol-XL) 50 mg 24 hr tablet Take 1 tablet (50 mg) by mouth once daily. DO NOT CRUSH OR CHEW 12/5/24   rEik Avila DO   Symbicort 160-4.5 mcg/actuation inhaler Inhale 2 puffs 2 times a day. 10/6/23   Historical Provider, MD      Review of Systems  Constitutional: NO F, chills, or sweats  Eyes: no blurred vision or visual disturbance  ENT: denies congestion, sore throat, difficulty hearing  Cardiovascular: no chest pain, no edema, no palps and no syncope.   Respiratory: BRUNER, with inhaler PRN, last used a couple weeks ago d/t pollen,  no cough,no s.o.b. and no wheezing  Gastrointestinal: no abdominal pain, no N/V, no blood in stools  Genitourinary: no dysuria, no urinary frequency, no urinary hesitancy and no feelings of urinary urgency.   Musculoskeletal: no  "arthralgias,  no back pain and no myalgias.   Integumentary: see HPI, no new skin lesions and no rashes.   Neurological: no difficulty walking, no headache, no limb weakness, no numbness and no tingling.   Endocrine: no recent weight gain and no recent weight loss.   Hematologic/Lymphatic: no tendency for easy bruising and no swollen glands.      Physical Exam  Constitutional:       Appearance: Normal appearance. He is obese.     Cardiovascular:      Rate and Rhythm: Normal rate.      Heart sounds: Normal heart sounds.   Pulmonary:      Effort: Pulmonary effort is normal.      Breath sounds: Normal breath sounds.   Abdominal:      General: Bowel sounds are normal.      Palpations: Abdomen is soft.     Musculoskeletal:         General: Normal range of motion.      Cervical back: Normal range of motion.      Right lower leg: No edema.      Left lower leg: No edema.     Skin:     General: Skin is warm and dry.      Comments: Tubi hose bilaterally, unable to view/ assess wound     Neurological:      Mental Status: He is alert and oriented to person, place, and time.          PAT AIRWAY:   Airway:     Mallampati::  IV    TM distance::  <3 FB    Neck ROM::  Full  normal        Visit Vitals  /90   Pulse 73   Temp 35.8 °C (96.4 °F) (Tympanic)   Resp 18   Ht 1.702 m (5' 7\")   Wt 149 kg (328 lb 14.8 oz)   SpO2 97%   BMI 51.52 kg/m²   Smoking Status Never   BSA 2.65 m²       DASI Risk Score      Flowsheet Row Pre-Admission Testing from 7/16/2025 in [facility]   Can you take care of yourself (eat, dress, bathe, or use toilet)?  2.75 filed at 07/16/2025 0846   Can you walk indoors, such as around your house? 1.75 filed at 07/16/2025 0846   Can you walk a block or two on level ground?  2.75 filed at 07/16/2025 0846   Can you climb a flight of stairs or walk up a hill? 5.5 filed at 07/16/2025 0846   Can you run a short distance? 8 filed at 07/16/2025 0846   Can you do light work around the house like dusting or " washing dishes? 2.7 filed at 07/16/2025 0846   Can you do moderate work around the house like vacuuming, sweeping floors or carrying groceries? 3.5 filed at 07/16/2025 0846   Can you do heavy work around the house like scrubbing floors or lifting and moving heavy furniture?  8 filed at 07/16/2025 0846   Can you do yard work like raking leaves, weeding or pushing a mower? 4.5 filed at 07/16/2025 0846   Can you have sexual relations? 5.25 filed at 07/16/2025 0846   Can you participate in moderate recreational activities like golf, bowling, dancing, doubles tennis or throwing a baseball or football? 6 filed at 07/16/2025 0846   Can you participate in strenous sports like swimming, singles tennis, football, basketball, or skiing? 7.5 filed at 07/16/2025 0846   DASI SCORE 58.2 filed at 07/16/2025 0846   METS Score (Will be calculated only when all the questions are answered) 9.9 filed at 07/16/2025 0846     Caprini DVT Assessment      Flowsheet Row ED to Hosp-Admission (Discharged) from 10/19/20[facility] in [facility] 3 with Erik Avila DO and Martin Piedra DO   DVT Score (IF A SCORE IS NOT CALCULATING, MUST SELECT A BMI TO COMPLETE) 5 filed at 10/19/2023 1354   BMI (BMI MUST BE CHOSEN) Greater than 50 (Venous stasis syndrome) filed at 10/19/2023 1354   RETIRED: Current Status Swollen legs filed at 10/19/2023 1354   RETIRED: Age 40-59 years filed at 10/19/2023 1354     Modified Frailty Index    No data to display  [id number] Stroke Risk Points  Current as of just now        N/A 0 to 9 Points:      Last Change: N/A          The [id number] risk score (Lip JAMIE, et al. 2009. © 2010 American College [employer]) quantifies the risk of stroke for a patient with atrial fibrillation. For patients without atrial fibrillation or under the age of 18 this score appears as N/A. Higher score values generally indicate higher risk of stroke.        This score is not applicable to this patient. Components are  not calculated.          Revised Cardiac Risk Index    No data to display  Apfel Simplified Score    No data to display  Risk Analysis Index Results This Encounter    No data found in the last 10 encounters.       Stop Bang Score      Flowsheet Row Pre-Admission Testing from 7/16/2025 in [facility]   Do you snore loudly? 1 filed at 07/16/2025 0927   Do you often feel tired or fatigued after your sleep? 0 filed at 07/16/2025 0927   Has anyone ever observed you stop breathing in your sleep? 1 filed at 07/16/2025 0927   Do you have or are you being treated for high blood pressure? 1 filed at 07/16/2025 0927   Recent BMI (Calculated) 51.5 filed at 07/16/2025 0927   Is BMI greater than 35 kg/m2? 1=Yes filed at 07/16/2025 0927   Age older than 50 years old? 0=No filed at 07/16/2025 0927   Is your neck circumference greater than 17 inches (Male) or 16 inches (Female)? 1 filed at 07/16/2025 0927   Gender - Male 1=Yes filed at 07/16/2025 0927   STOP-BANG Total Score 6 filed at 07/16/2025 0927     Prodigy: High Risk  Total Score: 8              Prodigy Gender Score          ARISCAT Score for Postoperative Pulmonary Complications    No data to display  Jiménez Perioperative Risk for Myocardial Infarction or Cardiac Arrest (CLAU)    No data to display      ASSESSMENT  Obesity   BMI 44.20     HTN/HLD  Takes Lasix, metoprolol  ECG 7/16/2025- NSR, 72 bpm    asthma/bronchitis/ MICHAEL  Uses inhaler PRN  + BRUNER, diminished LS bilaterally from mid lobe-   Not treating MICHAEL currently, states was intolerant to MICHAEL    Cellulitis   Plan for  left SVG ablation as scheduled       ANESTHESIA FINDINGS:  Intubation History: No history of difficult intubation  Significant Anesthesia Considerations:      Airway History: No abnormal airway history  Predictors of Difficult Airway Management  ? STOP BANG- 6, + MICHAEL/ untreated  ? Obesity  ? Short thick neck        CONSULTS:    Patient does not require consults for optimization at this time.      The Following Tests/Procedures Have Been Initiated and Reviewed/ interpreted by me:   CBC, Coag screen,  ECG  CMP reviewed from 6/19/2025      Planned Anesthetic: MAC     Instructions Given to Patient:  *Reviewed Medications to be taken in AM of surgery or held  Patient given verbal and written preop instructions and voices comprehension and compliance.     No further testing required.      PLAN  This patient is prepared for surgery.           [1]   Past Medical History:  Diagnosis Date    Abnormal levels of other serum enzymes     Elevated liver enzymes    Hypertension     Lateral epicondylitis, right elbow     Right tennis elbow    Other hypertrophic disorders of the skin     Skin tag    Personal history of other diseases of the nervous system and sense organs     History of serous otitis media    Personal history of other diseases of the respiratory system     History of bronchitis    Personal history of other specified conditions     History of abdominal pain    Plantar fascial fibromatosis     Plantar fasciitis    Sleep apnea     Unspecified asthma, uncomplicated (HHS-HCC)     Asthmatic bronchitis   [2]   Past Surgical History:  Procedure Laterality Date    OTHER SURGICAL HISTORY  09/22/2015    History Of Prior Surgery    SKIN GRAFT     [3]   Family History  Problem Relation Name Age of Onset    Lung disease Father     [4] No Known Allergies

## 2025-07-16 NOTE — PREPROCEDURE INSTRUCTIONS
Medication List            Accurate as of July 16, 2025  9:46 AM. Always use your most recent med list.                albuterol 90 mcg/actuation inhaler  Inhale 2 puffs every 6 hours if needed for wheezing or shortness of breath.  Medication Adjustments for Surgery: Take/Use as prescribed     cholecalciferol 25 mcg (1,000 units) capsule  Commonly known as: Vitamin D-3  Take 2 capsules (50 mcg) by mouth once daily.  Additional Medication Adjustments for Surgery: Take last dose 7 days before surgery  Notes to patient: Stop now     fluticasone propion-salmeteroL 250-50 mcg/dose diskus inhaler  Commonly known as: Advair Diskus  Inhale 1 puff 2 times a day. Rinse mouth with water after use to reduce aftertaste and incidence of candidiasis. Do not swallow.  Medication Adjustments for Surgery: Take/Use as prescribed     furosemide 20 mg tablet  Commonly known as: Lasix  TAKE ONE TABLET (20mg) BY MOUTH once EVERY DAY  Medication Adjustments for Surgery: Do Not take on the morning of surgery     magnesium oxide 400 mg (241.3 mg elemental) tablet  Commonly known as: Mag-Ox  Take 1 tablet (400 mg) by mouth once daily.  Additional Medication Adjustments for Surgery: Take last dose 7 days before surgery  Notes to patient: Stop now     * metoprolol succinate XL 25 mg 24 hr tablet  Commonly known as: Toprol-XL  Take 1 tablet (25 mg) by mouth once daily. Do not crush or chew.  Medication Adjustments for Surgery: Take on the morning of surgery     * metoprolol succinate XL 50 mg 24 hr tablet  Commonly known as: Toprol-XL  TAKE 1 TABLET BY MOUTH ONCE DAILY. DO NOT CRUSH OR CHEW.  Medication Adjustments for Surgery: Take on the morning of surgery     Symbicort 160-4.5 mcg/actuation inhaler  Generic drug: budesonide-formoterol  Medication Adjustments for Surgery: Take/Use as prescribed           * This list has 2 medication(s) that are the same as other medications prescribed for you. Read the directions carefully, and ask your  doctor or other care provider to review them with you.                  PRE-OPERATIVE INSTRUCTIONS FOR SURGERY    *Do not eat anything after midnight the night of surgery.  This includes food of any kind (including hard candy, cough drops, mints).     You may have up to 13 ounces of clear liquid until TWO hours prior to your scheduled arrival time  Clear liquids include water, black tea/coffee, (no milk or cream) apple juice and electrolyte drinks (GATORADE).  You may chew gum until TWO hours prior you your surgery/procedure.         -----------    *One of our staff members will call you ONE business day before your surgery, between 11am-2 pm to let you know the time to arrive.    If you have not received a call by 2 pm, call [phone]    *When you arrive at the hospital-->GO TO Registration on the ground floor    *Stop smoking 24 hours prior to surgery.  No Marijuana, CBD Oil or Vaping for 48 hours  *No alcohol 24 hours prior to surgery    *You will need a responsible adult to drive you home      -You may be asked to remove your dentures, partial plate, eyeglasses or contact lenses before going to surgery.  Please bring a case for these items.    -Body piercings need to be removed.  Jewelry and valuables should be left at home.    -Put on loose,  comfortable, clean clothing, that will accommodate bandages    *If you have any further questions about your pre-op instructions,  not mentioned in this handout, then call [phone]*    What you may be asked to bring to surgery:  Insurance info and photo ID                           NPO Instructions:    Do not eat any food after midnight the night before your surgery/procedure.  You may have clear liquids until TWO hours before surgery/procedure. This includes water, black tea/coffee, (no milk or cream) apple juice and electrolyte drinks (Gatorade).    Additional Instructions:     Day of Surgery:  Review your medication instructions, take indicated medications  You  may have clear liquids until TWO hours before surgery/procedure.  This includes water, black tea/coffee, (no milk or cream) apple juice and electrolyte drinks (Gatorade)  Wear  comfortable loose fitting clothing  Do not use moisturizers, creams, lotions or perfume  All jewelry and valuables should be left at home

## 2025-07-17 LAB
ATRIAL RATE: 72 BPM
P AXIS: 63 DEGREES
P OFFSET: 182 MS
P ONSET: 126 MS
PR INTERVAL: 188 MS
Q ONSET: 220 MS
QRS COUNT: 11 BEATS
QRS DURATION: 102 MS
QT INTERVAL: 390 MS
QTC CALCULATION(BAZETT): 427 MS
QTC FREDERICIA: 414 MS
R AXIS: 18 DEGREES
T AXIS: 18 DEGREES
T OFFSET: 415 MS
VENTRICULAR RATE: 72 BPM

## 2025-07-21 ENCOUNTER — ANESTHESIA EVENT (OUTPATIENT)
Dept: OPERATING ROOM | Facility: HOSPITAL | Age: 45
End: 2025-07-21
Payer: COMMERCIAL

## 2025-07-21 ENCOUNTER — HOSPITAL ENCOUNTER (OUTPATIENT)
Facility: HOSPITAL | Age: 45
Setting detail: OUTPATIENT SURGERY
Discharge: HOME | End: 2025-07-21
Attending: SURGERY | Admitting: SURGERY
Payer: COMMERCIAL

## 2025-07-21 ENCOUNTER — ANESTHESIA (OUTPATIENT)
Dept: OPERATING ROOM | Facility: HOSPITAL | Age: 45
End: 2025-07-21
Payer: COMMERCIAL

## 2025-07-21 VITALS
WEIGHT: 315 LBS | OXYGEN SATURATION: 95 % | TEMPERATURE: 97.9 F | BODY MASS INDEX: 49.44 KG/M2 | HEIGHT: 67 IN | SYSTOLIC BLOOD PRESSURE: 115 MMHG | HEART RATE: 67 BPM | RESPIRATION RATE: 16 BRPM | DIASTOLIC BLOOD PRESSURE: 59 MMHG

## 2025-07-21 DIAGNOSIS — I87.332 CHRONIC VENOUS HYPERTENSION (IDIOPATHIC) WITH ULCER AND INFLAMMATION OF LEFT LOWER EXTREMITY: Primary | ICD-10-CM

## 2025-07-21 DIAGNOSIS — I87.2 VENOUS INSUFFICIENCY (CHRONIC) (PERIPHERAL): ICD-10-CM

## 2025-07-21 PROCEDURE — 2500000005 HC RX 250 GENERAL PHARMACY W/O HCPCS: Performed by: NURSE ANESTHETIST, CERTIFIED REGISTERED

## 2025-07-21 PROCEDURE — 2500000004 HC RX 250 GENERAL PHARMACY W/ HCPCS (ALT 636 FOR OP/ED): Performed by: NURSE ANESTHETIST, CERTIFIED REGISTERED

## 2025-07-21 PROCEDURE — 7100000001 HC RECOVERY ROOM TIME - INITIAL BASE CHARGE: Performed by: SURGERY

## 2025-07-21 PROCEDURE — 3600000008 HC OR TIME - EACH INCREMENTAL 1 MINUTE - PROCEDURE LEVEL THREE: Performed by: SURGERY

## 2025-07-21 PROCEDURE — 7100000010 HC PHASE TWO TIME - EACH INCREMENTAL 1 MINUTE: Performed by: SURGERY

## 2025-07-21 PROCEDURE — 3700000002 HC GENERAL ANESTHESIA TIME - EACH INCREMENTAL 1 MINUTE: Performed by: SURGERY

## 2025-07-21 PROCEDURE — 2500000004 HC RX 250 GENERAL PHARMACY W/ HCPCS (ALT 636 FOR OP/ED): Performed by: SURGERY

## 2025-07-21 PROCEDURE — 36475 ENDOVENOUS RF 1ST VEIN: CPT | Performed by: SURGERY

## 2025-07-21 PROCEDURE — 2500000005 HC RX 250 GENERAL PHARMACY W/O HCPCS: Performed by: SURGERY

## 2025-07-21 PROCEDURE — C1888 ENDOVAS NON-CARDIAC ABL CATH: HCPCS | Performed by: SURGERY

## 2025-07-21 PROCEDURE — 2720000007 HC OR 272 NO HCPCS: Performed by: SURGERY

## 2025-07-21 PROCEDURE — 3600000003 HC OR TIME - INITIAL BASE CHARGE - PROCEDURE LEVEL THREE: Performed by: SURGERY

## 2025-07-21 PROCEDURE — A36475 PR ENDOVENOUS RF, 1ST VEIN: Performed by: NURSE ANESTHETIST, CERTIFIED REGISTERED

## 2025-07-21 PROCEDURE — 7100000009 HC PHASE TWO TIME - INITIAL BASE CHARGE: Performed by: SURGERY

## 2025-07-21 PROCEDURE — 7100000002 HC RECOVERY ROOM TIME - EACH INCREMENTAL 1 MINUTE: Performed by: SURGERY

## 2025-07-21 PROCEDURE — 3700000001 HC GENERAL ANESTHESIA TIME - INITIAL BASE CHARGE: Performed by: SURGERY

## 2025-07-21 PROCEDURE — A36475 PR ENDOVENOUS RF, 1ST VEIN: Performed by: ANESTHESIOLOGY

## 2025-07-21 RX ORDER — PHENYLEPHRINE HCL IN 0.9% NACL 1 MG/10 ML
SYRINGE (ML) INTRAVENOUS AS NEEDED
Status: DISCONTINUED | OUTPATIENT
Start: 2025-07-21 | End: 2025-07-21

## 2025-07-21 RX ORDER — GLYCOPYRROLATE 0.2 MG/ML
INJECTION INTRAMUSCULAR; INTRAVENOUS AS NEEDED
Status: DISCONTINUED | OUTPATIENT
Start: 2025-07-21 | End: 2025-07-21

## 2025-07-21 RX ORDER — LIDOCAINE HYDROCHLORIDE AND EPINEPHRINE 10; 10 UG/ML; MG/ML
INJECTION, SOLUTION INFILTRATION; PERINEURAL AS NEEDED
Status: DISCONTINUED | OUTPATIENT
Start: 2025-07-21 | End: 2025-07-21 | Stop reason: HOSPADM

## 2025-07-21 RX ORDER — SUCCINYLCHOLINE CHLORIDE 20 MG/ML INJECTION SOLUTION
SOLUTION AS NEEDED
Status: DISCONTINUED | OUTPATIENT
Start: 2025-07-21 | End: 2025-07-21

## 2025-07-21 RX ORDER — ACETAMINOPHEN 325 MG/1
650 TABLET ORAL EVERY 4 HOURS PRN
Status: DISCONTINUED | OUTPATIENT
Start: 2025-07-21 | End: 2025-07-21 | Stop reason: HOSPADM

## 2025-07-21 RX ORDER — ONDANSETRON HYDROCHLORIDE 2 MG/ML
INJECTION, SOLUTION INTRAVENOUS AS NEEDED
Status: DISCONTINUED | OUTPATIENT
Start: 2025-07-21 | End: 2025-07-21

## 2025-07-21 RX ORDER — MIDAZOLAM HYDROCHLORIDE 1 MG/ML
1 INJECTION, SOLUTION INTRAMUSCULAR; INTRAVENOUS ONCE AS NEEDED
Status: DISCONTINUED | OUTPATIENT
Start: 2025-07-21 | End: 2025-07-21 | Stop reason: HOSPADM

## 2025-07-21 RX ORDER — KETAMINE HCL IN NACL, ISO-OSM 100MG/10ML
SYRINGE (ML) INJECTION AS NEEDED
Status: DISCONTINUED | OUTPATIENT
Start: 2025-07-21 | End: 2025-07-21

## 2025-07-21 RX ORDER — METOCLOPRAMIDE HYDROCHLORIDE 5 MG/ML
INJECTION INTRAMUSCULAR; INTRAVENOUS AS NEEDED
Status: DISCONTINUED | OUTPATIENT
Start: 2025-07-21 | End: 2025-07-21

## 2025-07-21 RX ORDER — FENTANYL CITRATE 50 UG/ML
INJECTION, SOLUTION INTRAMUSCULAR; INTRAVENOUS AS NEEDED
Status: DISCONTINUED | OUTPATIENT
Start: 2025-07-21 | End: 2025-07-21

## 2025-07-21 RX ORDER — MIDAZOLAM HYDROCHLORIDE 1 MG/ML
INJECTION, SOLUTION INTRAMUSCULAR; INTRAVENOUS AS NEEDED
Status: DISCONTINUED | OUTPATIENT
Start: 2025-07-21 | End: 2025-07-21

## 2025-07-21 RX ORDER — NORETHINDRONE AND ETHINYL ESTRADIOL 0.5-0.035
KIT ORAL AS NEEDED
Status: DISCONTINUED | OUTPATIENT
Start: 2025-07-21 | End: 2025-07-21

## 2025-07-21 RX ORDER — LABETALOL HYDROCHLORIDE 5 MG/ML
5 INJECTION, SOLUTION INTRAVENOUS ONCE AS NEEDED
Status: DISCONTINUED | OUTPATIENT
Start: 2025-07-21 | End: 2025-07-21 | Stop reason: HOSPADM

## 2025-07-21 RX ORDER — ALBUTEROL SULFATE 0.83 MG/ML
2.5 SOLUTION RESPIRATORY (INHALATION) ONCE AS NEEDED
Status: DISCONTINUED | OUTPATIENT
Start: 2025-07-21 | End: 2025-07-21 | Stop reason: HOSPADM

## 2025-07-21 RX ORDER — ONDANSETRON HYDROCHLORIDE 2 MG/ML
4 INJECTION, SOLUTION INTRAVENOUS ONCE AS NEEDED
Status: DISCONTINUED | OUTPATIENT
Start: 2025-07-21 | End: 2025-07-21 | Stop reason: HOSPADM

## 2025-07-21 RX ORDER — DEXMEDETOMIDINE HYDROCHLORIDE 100 UG/ML
INJECTION, SOLUTION INTRAVENOUS AS NEEDED
Status: DISCONTINUED | OUTPATIENT
Start: 2025-07-21 | End: 2025-07-21

## 2025-07-21 RX ORDER — LIDOCAINE HCL/PF 100 MG/5ML
SYRINGE (ML) INTRAVENOUS AS NEEDED
Status: DISCONTINUED | OUTPATIENT
Start: 2025-07-21 | End: 2025-07-21

## 2025-07-21 RX ORDER — PROPOFOL 10 MG/ML
INJECTION, EMULSION INTRAVENOUS AS NEEDED
Status: DISCONTINUED | OUTPATIENT
Start: 2025-07-21 | End: 2025-07-21

## 2025-07-21 RX ORDER — SODIUM CHLORIDE, SODIUM LACTATE, POTASSIUM CHLORIDE, CALCIUM CHLORIDE 600; 310; 30; 20 MG/100ML; MG/100ML; MG/100ML; MG/100ML
100 INJECTION, SOLUTION INTRAVENOUS CONTINUOUS
Status: ACTIVE | OUTPATIENT
Start: 2025-07-21 | End: 2025-07-21

## 2025-07-21 RX ORDER — HYDRALAZINE HYDROCHLORIDE 20 MG/ML
5 INJECTION INTRAMUSCULAR; INTRAVENOUS EVERY 30 MIN PRN
Status: DISCONTINUED | OUTPATIENT
Start: 2025-07-21 | End: 2025-07-21 | Stop reason: HOSPADM

## 2025-07-21 RX ORDER — LIDOCAINE HYDROCHLORIDE 10 MG/ML
0.1 INJECTION, SOLUTION INFILTRATION; PERINEURAL ONCE
Status: DISCONTINUED | OUTPATIENT
Start: 2025-07-21 | End: 2025-07-21 | Stop reason: HOSPADM

## 2025-07-21 RX ORDER — ROCURONIUM BROMIDE 10 MG/ML
INJECTION, SOLUTION INTRAVENOUS AS NEEDED
Status: DISCONTINUED | OUTPATIENT
Start: 2025-07-21 | End: 2025-07-21

## 2025-07-21 RX ORDER — SODIUM CHLORIDE 0.9 G/100ML
INJECTION, SOLUTION IRRIGATION AS NEEDED
Status: DISCONTINUED | OUTPATIENT
Start: 2025-07-21 | End: 2025-07-21 | Stop reason: HOSPADM

## 2025-07-21 RX ADMIN — MIDAZOLAM 2 MG: 1 INJECTION INTRAMUSCULAR; INTRAVENOUS at 07:46

## 2025-07-21 RX ADMIN — EPHEDRINE SULFATE 10 MG: 50 INJECTION, SOLUTION INTRAVENOUS at 08:24

## 2025-07-21 RX ADMIN — PROPOFOL 200 MG: 10 INJECTION, EMULSION INTRAVENOUS at 07:48

## 2025-07-21 RX ADMIN — EPHEDRINE SULFATE 10 MG: 50 INJECTION, SOLUTION INTRAVENOUS at 08:38

## 2025-07-21 RX ADMIN — DEXAMETHASONE SODIUM PHOSPHATE 4 MG: 4 INJECTION, SOLUTION INTRAMUSCULAR; INTRAVENOUS at 08:00

## 2025-07-21 RX ADMIN — Medication 200 MCG: at 08:09

## 2025-07-21 RX ADMIN — ROCURONIUM BROMIDE 5 MG: 10 INJECTION, SOLUTION INTRAVENOUS at 07:48

## 2025-07-21 RX ADMIN — LIDOCAINE HYDROCHLORIDE 80 MG: 20 INJECTION INTRAVENOUS at 07:48

## 2025-07-21 RX ADMIN — Medication 100 MCG: at 08:03

## 2025-07-21 RX ADMIN — EPHEDRINE SULFATE 10 MG: 50 INJECTION, SOLUTION INTRAVENOUS at 08:17

## 2025-07-21 RX ADMIN — DEXMEDETOMIDINE HYDROCHLORIDE 20 MCG: 100 INJECTION, SOLUTION INTRAVENOUS at 07:55

## 2025-07-21 RX ADMIN — METOCLOPRAMIDE 10 MG: 5 INJECTION, SOLUTION INTRAMUSCULAR; INTRAVENOUS at 08:00

## 2025-07-21 RX ADMIN — CEFAZOLIN 3 G: 3 INJECTION, POWDER, FOR SOLUTION INTRAVENOUS at 07:55

## 2025-07-21 RX ADMIN — ONDANSETRON 4 MG: 2 INJECTION INTRAMUSCULAR; INTRAVENOUS at 08:32

## 2025-07-21 RX ADMIN — GLYCOPYRROLATE 0.1 MG: 0.2 INJECTION, SOLUTION INTRAMUSCULAR; INTRAVENOUS at 08:10

## 2025-07-21 RX ADMIN — Medication 30 MG: at 07:48

## 2025-07-21 RX ADMIN — SODIUM CHLORIDE, POTASSIUM CHLORIDE, SODIUM LACTATE AND CALCIUM CHLORIDE: 600; 310; 30; 20 INJECTION, SOLUTION INTRAVENOUS at 07:45

## 2025-07-21 RX ADMIN — Medication 180 MG: at 07:48

## 2025-07-21 RX ADMIN — FENTANYL CITRATE 100 MCG: 50 INJECTION, SOLUTION INTRAMUSCULAR; INTRAVENOUS at 07:48

## 2025-07-21 SDOH — HEALTH STABILITY: MENTAL HEALTH: CURRENT SMOKER: 0

## 2025-07-21 ASSESSMENT — PAIN SCALES - GENERAL
PAINLEVEL_OUTOF10: 0 - NO PAIN
PAINLEVEL_OUTOF10: 0 - NO PAIN
PAIN_LEVEL: 0
PAINLEVEL_OUTOF10: 0 - NO PAIN

## 2025-07-21 ASSESSMENT — PAIN - FUNCTIONAL ASSESSMENT
PAIN_FUNCTIONAL_ASSESSMENT: 0-10
PAIN_FUNCTIONAL_ASSESSMENT: 0-10

## 2025-07-21 NOTE — ANESTHESIA PREPROCEDURE EVALUATION
Patient: Chris Chance    Procedure Information       Date/Time: 07/21/25 0730    Procedure: LEFT GREATER SAPHENOUS VEIN RADIOFREQUENCY ABLATION (Left) - Radiofrequency ablation of left greater saphenous vein    Location: [facility] OR 12 / [facility]    Surgeons: Amy Marshall MD            Relevant Problems   Cardiac   (+) Benign essential hypertension   (+) Hyperlipidemia   (+) Inappropriate sinus tachycardia, so stated      Pulmonary   (+) Asthma      Neuro   (+) Acute lumbar radiculopathy      Endocrine   (+) Morbid obesity (Multi)      Hematology   (+) Anemia, unspecified      ID   (+) COVID-19   (+) Tinea corporis   (+) Upper respiratory infection with cough and congestion       Clinical information reviewed:    Allergies  Meds               NPO Detail:  NPO/Void Status  Date of Last Liquid: 07/21/25  Time of Last Liquid: 0000  Date of Last Solid: 07/21/25  Time of Last Solid: 0000         Physical Exam    Airway  Mallampati: II  TM distance: >3 FB  Neck ROM: full  Mouth opening: 3 or more finger widths     Cardiovascular   Rhythm: regular     Dental - normal exam     Pulmonary    Abdominal            Anesthesia Plan    History of general anesthesia?: yes  History of complications of general anesthesia?: no    ASA 4     MAC     The patient is not a current smoker.  Patient was not previously instructed to abstain from smoking on day of procedure.  Patient did not smoke on day of procedure.    intravenous induction   Anesthetic plan and risks discussed with patient.  Use of blood products discussed with patient who.    Plan discussed with CRNA.

## 2025-07-21 NOTE — ANESTHESIA POSTPROCEDURE EVALUATION
Patient: Chris Chance    Procedure Summary       Date: 07/21/25 Room / Location: [facility] OR 12 / [facility]    Anesthesia Start: 0741 Anesthesia Stop:     Procedure: LEFT GREATER SAPHENOUS VEIN RADIOFREQUENCY ABLATION (Left: Leg Lower) Diagnosis:       Chronic venous hypertension (idiopathic) with ulcer and inflammation of left lower extremity      Venous insufficiency (chronic) (peripheral)      (Chronic venous hypertension (idiopathic) with ulcer and inflammation of left lower extremity [I87.332])      (Venous insufficiency (chronic) (peripheral) [I87.2])    Surgeons: Amy Marshall MD Responsible Provider: Edwin Melgar MD    Anesthesia Type: MAC ASA Status: 4            Anesthesia Type: MAC    Vitals Value Taken Time   /78 07/21/25 08:53   Temp 36.6 07/21/25 08:55   Pulse 71 07/21/25 08:54   Resp 20 07/21/25 08:55   SpO2 97 % 07/21/25 08:54   Vitals shown include unfiled device data.    Anesthesia Post Evaluation    Patient location during evaluation: PACU  Patient participation: complete - patient participated  Level of consciousness: awake and alert  Pain score: 0  Pain management: adequate  Airway patency: patent  Cardiovascular status: acceptable and hemodynamically stable  Respiratory status: acceptable, face mask, nonlabored ventilation and spontaneous ventilation  Hydration status: acceptable  Postoperative Nausea and Vomiting: none        There were no known notable events for this encounter.

## 2025-07-21 NOTE — ANESTHESIA PROCEDURE NOTES
Airway  Date/Time: 7/21/2025 7:50 AM  Reason: elective      Staffing  Performed: CRNA   Authorized by: Edwin Melgar MD    Performed by: LAURA Boo-BRANDO  Patient location during procedure: OR    Patient Condition  Indications for airway management: anesthesia and airway protection  Patient position: sniffing  Sedation level: deep     Final Airway Details   Preoxygenated: yes  Final airway type: endotracheal airway  Successful airway: ETT  Cuffed: yes   Successful intubation technique: direct laryngoscopy  Adjuncts used in placement: intubating stylet  Endotracheal tube insertion site: oral  Blade: Ricardo  Blade size: #4  ETT size (mm): 8.0  Cormack-Lehane Classification: grade I - full view of glottis  Placement verified by: chest auscultation and capnometry   Measured from: lips  ETT to lips (cm): 23  Ventilation between attempts: none  Number of attempts at approach: 1  Number of other approaches attempted: 0    Additional Comments  Elective Martinez mac 4 utilized without difficulty

## 2025-07-21 NOTE — OP NOTE
LEFT GREATER SAPHENOUS VEIN RADIOFREQUENCY ABLATION (L) Operative Note     Date: 2025  OR Location: [facility]    Name: Chris Chance, : 1980, Age: 44 y.o., MRN: [id number], Sex: male    Diagnosis  Pre-op Diagnosis      * Chronic venous hypertension (idiopathic) with ulcer and inflammation of left lower extremity [I87.332]     * Venous insufficiency (chronic) (peripheral) [I87.2] Post-op Diagnosis     * Chronic venous hypertension (idiopathic) with ulcer and inflammation of left lower extremity [I87.332]     * Venous insufficiency (chronic) (peripheral) [I87.2]     Procedures  Radiofrequency ablation of left greater saphenous vein x 12 cycles      Surgeons      * Amy Marshall - Primary    Resident/Fellow/Other Assistant:  Surgeons and Role:  * No surgeons found with a matching role *    Staff:   Circulator: Rosie Ford Person: Cony  Surgical Assistant: Jaquan Yeboah Circulator: Saurabh    Anesthesia Staff: Anesthesiologist: Edwin Melgar MD  CRNA: LAURA Boo-CRNA    Procedure Summary  Anesthesia: Anesthesia type not filed in the log.  ASA: IV  Estimated Blood Loss: 5mL  Intra-op Medications: * Intraprocedure medication information is unavailable because the case start and end events have not been set *           Anesthesia Record               Intraprocedure I/O Totals          Intake    ceFAZolin (Ancef) 3 g in dextrose 5%  mL 100.00 mL    Total Intake 100 mL          Specimen: No specimens collected              Drains and/or Catheters: * None in log *    Tourniquet Times:         Implants:     Findings: thrombosed left greater saphenous vein at conclusion of procedure    Indications: Chris Chance is an 44 y.o. male who is having surgery for Chronic venous hypertension (idiopathic) with ulcer and inflammation of left lower extremity [I87.332]  Venous insufficiency (chronic) (peripheral) [I87.2].     The patient was seen in the preoperative area. The risks, benefits,  complications, treatment options, non-operative alternatives, expected recovery and outcomes were discussed with the patient. The possibilities of reaction to medication, pulmonary aspiration, injury to surrounding structures, bleeding, recurrent infection, the need for additional procedures, failure to diagnose a condition, and creating a complication requiring transfusion or operation were discussed with the patient. The patient concurred with the proposed plan, giving informed consent.  The site of surgery was properly noted/marked if necessary per policy. The patient has been actively warmed in preoperative area. Preoperative antibiotics have been ordered and given within 1 hours of incision. Venous thrombosis prophylaxis have been ordered including unilateral sequential compression device    Procedure Details:   Description of procedure:  After the proper preoperative work-up informed consent was obtained, the patient was taken to the operating room and laid in supine position.  He was placed on hemodynamic monitoring and then placed under anesthesia by the anesthesia team.  The left leg was prepped and draped in sterile fashion.  Then the left greater saphenous vein was visualized under duplex ultrasound.  The vein was noted to be widely patent and large in caliber.  It was accessed just above the ankle under direct ultrasound visualization using a micropuncture needle and sheath.  The 7 [place] sheath was placed.  A radiofrequency ablation catheter was passed through the sheath and the tip guided up to the saphenofemoral junction.  Tumescent anesthesia was then given around the entire length of the saphenous vein to be treated.  The patient was then placed in Trendelenburg position.  The greater saphenous vein was ablated using the radiofrequency catheter starting at the proximal segment just a couple centimeters distal to the saphenofemoral junction.  This first segment was treated with 2 cycles of RFA.  Each  subsequent segment was treated with 1 cycle of RFA throughout the entire remaining length of the greater saphenous vein to be treated.  There was a total of 12 cycles.  The catheter was then removed.  The greater saphenous vein was again scanned with the duplex ultrasound, and there is already note of thrombus closure within the vein.  Therefore intervention was thought to be successful. The sheath was then removed and manual pressure applied over the access site until hemostasis was achieved.  Then Dermabond and dressings were applied.  The leg was then wrapped in a Kerlix and an Ace wrap from the foot to the groin.  Procedure was then completed.  The patient tolerated the procedure well and there were no complications.  He was transferred to the recovery area in stable condition.  All instrument and sponge counts were correct at the conclusion of the procedure.     Evidence of Infection: No   Complications:  None; patient tolerated the procedure well.    Disposition: PACU - hemodynamically stable.  Condition: stable         Task Performed by RNFA or Surgical Assistant:  Pre-procedure prep  Dressing application          Additional Details: none    Attending Attestation: I performed the procedure.    Amy Marshall  Phone Number: [phone]

## 2025-07-23 ENCOUNTER — OFFICE VISIT (OUTPATIENT)
Dept: WOUND CARE | Facility: CLINIC | Age: 45
End: 2025-07-23
Payer: COMMERCIAL

## 2025-07-23 PROCEDURE — 29581 APPL MULTLAYER CMPRN SYS LEG: CPT | Mod: LT

## 2025-07-30 ENCOUNTER — OFFICE VISIT (OUTPATIENT)
Dept: WOUND CARE | Facility: CLINIC | Age: 45
End: 2025-07-30
Payer: COMMERCIAL

## 2025-07-30 PROCEDURE — 99213 OFFICE O/P EST LOW 20 MIN: CPT

## 2025-07-31 ENCOUNTER — APPOINTMENT (OUTPATIENT)
Dept: WOUND CARE | Facility: CLINIC | Age: 45
End: 2025-07-31
Payer: COMMERCIAL

## 2025-08-05 ENCOUNTER — APPOINTMENT (OUTPATIENT)
Dept: VASCULAR SURGERY | Facility: CLINIC | Age: 45
End: 2025-08-05
Payer: COMMERCIAL

## 2025-08-05 VITALS
WEIGHT: 315 LBS | OXYGEN SATURATION: 95 % | HEART RATE: 76 BPM | DIASTOLIC BLOOD PRESSURE: 72 MMHG | SYSTOLIC BLOOD PRESSURE: 140 MMHG | HEIGHT: 67 IN | BODY MASS INDEX: 49.44 KG/M2

## 2025-08-05 DIAGNOSIS — I87.2 VENOUS STASIS DERMATITIS OF LEFT LOWER EXTREMITY: ICD-10-CM

## 2025-08-05 DIAGNOSIS — I87.332 CHRONIC VENOUS HYPERTENSION (IDIOPATHIC) WITH ULCER AND INFLAMMATION OF LEFT LOWER EXTREMITY: ICD-10-CM

## 2025-08-05 DIAGNOSIS — I87.2 VENOUS INSUFFICIENCY (CHRONIC) (PERIPHERAL): Primary | ICD-10-CM

## 2025-08-05 PROCEDURE — 3077F SYST BP >= 140 MM HG: CPT | Performed by: SURGERY

## 2025-08-05 PROCEDURE — 3078F DIAST BP <80 MM HG: CPT | Performed by: SURGERY

## 2025-08-05 PROCEDURE — 99214 OFFICE O/P EST MOD 30 MIN: CPT | Performed by: SURGERY

## 2025-08-05 PROCEDURE — 1036F TOBACCO NON-USER: CPT | Performed by: SURGERY

## 2025-08-05 PROCEDURE — 3008F BODY MASS INDEX DOCD: CPT | Performed by: SURGERY

## 2025-08-05 NOTE — PROGRESS NOTES
History Of Present Illness  Chris Chance is a 44 y.o. male presenting for postoperative visit s/p radiofrequency ablation of the left greater saphenous vein.  He does report some soreness in the left medial thigh since procedure, but otherwise no procedural issues.  His left calf venous ulcer had healed shortly before the procedure.  However he states that within the past week he has had some recurrent skin breakdown in the left posterior calf due to wearing his compression socks and Tubigrip stockings.  He does have an appointment to be seen in the wound center again in 2 days.     Past Medical History  He has a past medical history of Abnormal levels of other serum enzymes, Hypertension, Lateral epicondylitis, right elbow, Other hypertrophic disorders of the skin, Personal history of other diseases of the nervous system and sense organs, Personal history of other diseases of the respiratory system, Personal history of other specified conditions, Plantar fascial fibromatosis, Sleep apnea, and Unspecified asthma, uncomplicated ([facility]).    Surgical History  He has a past surgical history that includes Other surgical history (09/22/2015) and Skin graft.     Social History  He reports that he has never smoked. He has never used smokeless tobacco. He reports that he does not drink alcohol and does not use drugs.    Family History  Family History[1]     Allergies  Patient has no known allergies.    Review of Systems     Physical Exam  Vitals reviewed.   Constitutional:       General: He is not in acute distress.     Appearance: Normal appearance. He is morbidly obese.   HENT:      Head: Normocephalic and atraumatic.     Eyes:      Extraocular Movements: Extraocular movements intact.      Pupils: Pupils are equal, round, and reactive to light.       Cardiovascular:      Rate and Rhythm: Normal rate and regular rhythm.      Pulses: Normal pulses.           Femoral pulses are 2+ on the right side and 2+ on the left  "side.       Dorsalis pedis pulses are 2+ on the right side and 2+ on the left side.        Posterior tibial pulses are 2+ on the right side and 2+ on the left side.      Heart sounds: Normal heart sounds.   Pulmonary:      Effort: Pulmonary effort is normal.      Breath sounds: Normal breath sounds.   Abdominal:      General: Bowel sounds are normal.      Palpations: Abdomen is soft.     Musculoskeletal:         General: No swelling or tenderness. Normal range of motion.      Cervical back: Normal range of motion.      Left lower le+ Edema present.     Skin:     General: Skin is warm and dry.      Capillary Refill: Capillary refill takes less than 2 seconds.      Comments: Superficial skin breakdown mild erythema of the left posterior calf     Neurological:      General: No focal deficit present.      Mental Status: He is alert and oriented to person, place, and time.      Cranial Nerves: No cranial nerve deficit.      Sensory: No sensory deficit.      Motor: No weakness.     Psychiatric:         Mood and Affect: Mood normal.         Behavior: Behavior normal.          Last Recorded Vitals  Blood pressure 140/72, pulse 76, height 1.702 m (5' 7\"), weight (!) 156 kg (343 lb), SpO2 95%.    Relevant Results    Current Medications[2]          Assessment/Plan   Diagnoses and all orders for this visit:  Venous insufficiency (chronic) (peripheral)  Venous stasis dermatitis of left lower extremity  Chronic venous hypertension (idiopathic) with ulcer and inflammation of left lower extremity      45yo male who is s/p radiofrequency ablation of the left greater saphenous vein 2 weeks ago.  He does have some mild soreness over the left medial this is likely secondary to send nerve irritation after the procedure.  I have assured him that this should resolve with time.  He does have some new skin breakdown over his left posterior calf.  I do agree with follow-up in the wound center for proper wound management.  I have also " recommended that he switch to juxta light compression to avoid any shearing over the newly healed area once he is healed again.  He may follow-up in the vascular surgery office as needed.       (This note was generated with voice recognition software and may contain errors including spelling, grammar, syntax and missed recognition of what was dictated, of which may not have been fully corrected)        Amy Marshall MD          [1]   Family History  Problem Relation Name Age of Onset    Lung disease Father     [2]   Current Outpatient Medications:     albuterol 90 mcg/actuation inhaler, Inhale 2 puffs every 6 hours if needed for wheezing or shortness of breath., Disp: 18 g, Rfl: 11    cholecalciferol (Vitamin D-3) 25 MCG (1000 UT) capsule, Take 2 capsules (50 mcg) by mouth once daily. (Patient taking differently: Take 1 capsule (25 mcg) by mouth once daily.), Disp: 180 capsule, Rfl: 3    fluticasone propion-salmeteroL (Advair Diskus) 250-50 mcg/dose diskus inhaler, Inhale 1 puff 2 times a day. Rinse mouth with water after use to reduce aftertaste and incidence of candidiasis. Do not swallow., Disp: 60 each, Rfl: 11    furosemide (Lasix) 20 mg tablet, TAKE ONE TABLET (20mg) BY MOUTH once EVERY DAY, Disp: 90 tablet, Rfl: 0    magnesium oxide (Mag-Ox) 400 mg (241.3 mg magnesium) tablet, Take 1 tablet (400 mg) by mouth once daily., Disp: 90 tablet, Rfl: 3    metoprolol succinate XL (Toprol-XL) 25 mg 24 hr tablet, Take 1 tablet (25 mg) by mouth once daily. Do not crush or chew., Disp: 90 tablet, Rfl: 3    metoprolol succinate XL (Toprol-XL) 50 mg 24 hr tablet, TAKE 1 TABLET BY MOUTH ONCE DAILY. DO NOT CRUSH OR CHEW., Disp: 90 tablet, Rfl: 0    Symbicort 160-4.5 mcg/actuation inhaler, Inhale 2 puffs 2 times a day., Disp: , Rfl:

## 2025-08-06 ENCOUNTER — OFFICE VISIT (OUTPATIENT)
Dept: WOUND CARE | Facility: CLINIC | Age: 45
End: 2025-08-06
Payer: COMMERCIAL

## 2025-08-06 PROCEDURE — 29581 APPL MULTLAYER CMPRN SYS LEG: CPT | Mod: LT

## 2025-08-13 ENCOUNTER — OFFICE VISIT (OUTPATIENT)
Dept: WOUND CARE | Facility: CLINIC | Age: 45
End: 2025-08-13
Payer: COMMERCIAL

## 2025-08-13 PROCEDURE — 29581 APPL MULTLAYER CMPRN SYS LEG: CPT | Mod: LT

## 2025-08-20 ENCOUNTER — OFFICE VISIT (OUTPATIENT)
Dept: WOUND CARE | Facility: CLINIC | Age: 45
End: 2025-08-20
Payer: COMMERCIAL

## 2025-08-20 PROCEDURE — 29581 APPL MULTLAYER CMPRN SYS LEG: CPT | Mod: LT

## 2025-08-27 ENCOUNTER — OFFICE VISIT (OUTPATIENT)
Dept: WOUND CARE | Facility: CLINIC | Age: 45
End: 2025-08-27
Payer: COMMERCIAL

## 2025-08-27 PROCEDURE — 29581 APPL MULTLAYER CMPRN SYS LEG: CPT | Mod: LT

## (undated) DEVICE — DRESSING, NON-ADHERENT, OIL EMULSION, CURITY, 3 X 8 IN, STERILE

## (undated) DEVICE — Device

## (undated) DEVICE — BANDAGE, GAUZE, CONFORMING, KERLIX, 6 PLY, 4.5 IN X 4.1 YD

## (undated) DEVICE — NEEDLE, SPINAL, 20 G X 3.5 IN, YELLOW HUB

## (undated) DEVICE — DRESSING, TRANSPARENT, TEGADERM, 4 X 4-3/4 IN

## (undated) DEVICE — DRESSING, SILVERCEL, NON-ADHERENT, 4.25 X 4.25IN

## (undated) DEVICE — STRIP, SKIN CLOSURE, STERI STRIP, REINFORCED, 0.5 X 4 IN

## (undated) DEVICE — DRESSING, ABDOMINAL, TENDERSORB, 8 X 7-1/2 IN, STERILE

## (undated) DEVICE — BANDAGE, ELASTIC, PREMIUM, SELF-CLOSE, 6 IN X 5.5 YD, STERILE

## (undated) DEVICE — STRIP, SKIN CLOSURE, STERI STRIP, REINFORCED, 1 X 5 IN

## (undated) DEVICE — PITCHER, GRADUATE, 32 OZ (1200CC), STERILE

## (undated) DEVICE — DRAPE, SHEET, THREE QRT, 56 X 77 IN, FAN FOLD, REINFORCE

## (undated) DEVICE — BANDAGE, ELASTIC, ACE, SELF-CLOSURE, 4 IN X 5 YD, NONSTERILE

## (undated) DEVICE — GLOVE, PROTEXIS PI CLASSIC, SZ-6.0, PF, LF

## (undated) DEVICE — APPLICATOR, CHLORAPREP, W/ORANGE TINT, 26ML

## (undated) DEVICE — BANDAGE, ELASTIC, ACE, ACE, DOUBLE LENGTH, 6 X 550 IN, LF

## (undated) DEVICE — COVER PROBE, ULTRASOUND GUSSETED, W/ GEL, 5 X 48 IN, CLOSED END

## (undated) DEVICE — DRAPE, SHEET, LAPAROTOMY, W/ISO-BAC, W/ARMBOARD COVERS, 98 X 122 IN, DISPOSABLE, LF, STERILE

## (undated) DEVICE — BAG, BANDED, 36IN X 28IN

## (undated) DEVICE — NEEDLE, HYPODERMIC, MONOJECT, 27 G X 1.5 IN

## (undated) DEVICE — GEL, ECOVUE, ULTRASOUND, 20GRAM, STERILE

## (undated) DEVICE — DRAPE, TIBURON, SPLIT SHEET, REINF ADH STRIP, 77X122

## (undated) DEVICE — PACKING, PLAIN, CURITY, 0.5 IN X 5 YD, STERILE

## (undated) DEVICE — PADDING, CAST, WYTEX, 6 IN X 4 YD, LF

## (undated) DEVICE — DRESSING, MAXORB II, 4 X 4IN, ALIGINATE

## (undated) DEVICE — SYRINGE, 10 CC, SLIP TIP

## (undated) DEVICE — BAG, DECANTER

## (undated) DEVICE — SYRINGE, 30 CC, LUER LOCK

## (undated) DEVICE — CLOSURE, VENOUS, W/CATHETER, 7 FR, 100 CM

## (undated) DEVICE — SYRINGE, 20 CC, LUER LOCK

## (undated) DEVICE — DRESSING, GAUZE, SPONGE, 12 PLY, CURITY, 4 X 4 IN, RIGID TRAY, STERILE